# Patient Record
Sex: FEMALE | Race: WHITE | Employment: OTHER | URBAN - METROPOLITAN AREA
[De-identification: names, ages, dates, MRNs, and addresses within clinical notes are randomized per-mention and may not be internally consistent; named-entity substitution may affect disease eponyms.]

---

## 2019-11-17 ENCOUNTER — ANESTHESIA EVENT (OUTPATIENT)
Dept: ENDOSCOPY | Age: 74
End: 2019-11-17
Payer: MEDICARE

## 2019-11-18 ENCOUNTER — ANESTHESIA (OUTPATIENT)
Dept: ENDOSCOPY | Age: 74
End: 2019-11-18
Payer: MEDICARE

## 2019-11-18 ENCOUNTER — HOSPITAL ENCOUNTER (OUTPATIENT)
Age: 74
Setting detail: OUTPATIENT SURGERY
Discharge: HOME | End: 2019-11-18
Attending: INTERNAL MEDICINE | Admitting: INTERNAL MEDICINE
Payer: MEDICARE

## 2019-11-18 VITALS
HEART RATE: 70 BPM | DIASTOLIC BLOOD PRESSURE: 69 MMHG | OXYGEN SATURATION: 97 % | HEIGHT: 63 IN | TEMPERATURE: 97.3 F | WEIGHT: 186 LBS | BODY MASS INDEX: 32.96 KG/M2 | SYSTOLIC BLOOD PRESSURE: 129 MMHG | RESPIRATION RATE: 14 BRPM

## 2019-11-18 PROCEDURE — 74011000250 HC RX REV CODE- 250: Performed by: NURSE ANESTHETIST, CERTIFIED REGISTERED

## 2019-11-18 PROCEDURE — 76060000031 HC ANESTHESIA FIRST 0.5 HR: Performed by: INTERNAL MEDICINE

## 2019-11-18 PROCEDURE — 74011250636 HC RX REV CODE- 250/636: Performed by: NURSE ANESTHETIST, CERTIFIED REGISTERED

## 2019-11-18 PROCEDURE — 88305 TISSUE EXAM BY PATHOLOGIST: CPT

## 2019-11-18 PROCEDURE — 77030013992 HC SNR POLYP ENDOSC BSC -B: Performed by: INTERNAL MEDICINE

## 2019-11-18 PROCEDURE — 76040000019: Performed by: INTERNAL MEDICINE

## 2019-11-18 PROCEDURE — 77030008565 HC TBNG SUC IRR ERBE -B: Performed by: INTERNAL MEDICINE

## 2019-11-18 PROCEDURE — 77030018846 HC SOL IRR STRL H20 ICUM -A: Performed by: INTERNAL MEDICINE

## 2019-11-18 RX ORDER — SODIUM CHLORIDE, SODIUM LACTATE, POTASSIUM CHLORIDE, CALCIUM CHLORIDE 600; 310; 30; 20 MG/100ML; MG/100ML; MG/100ML; MG/100ML
75 INJECTION, SOLUTION INTRAVENOUS CONTINUOUS
Status: CANCELLED | OUTPATIENT
Start: 2019-11-18 | End: 2019-11-18

## 2019-11-18 RX ORDER — SODIUM CHLORIDE 0.9 % (FLUSH) 0.9 %
5-40 SYRINGE (ML) INJECTION AS NEEDED
Status: CANCELLED | OUTPATIENT
Start: 2019-11-18

## 2019-11-18 RX ORDER — FENTANYL CITRATE 50 UG/ML
50 INJECTION, SOLUTION INTRAMUSCULAR; INTRAVENOUS AS NEEDED
Status: CANCELLED | OUTPATIENT
Start: 2019-11-18

## 2019-11-18 RX ORDER — ONDANSETRON 2 MG/ML
4 INJECTION INTRAMUSCULAR; INTRAVENOUS ONCE
Status: CANCELLED | OUTPATIENT
Start: 2019-11-18 | End: 2019-11-18

## 2019-11-18 RX ORDER — SODIUM CHLORIDE 0.9 % (FLUSH) 0.9 %
5-40 SYRINGE (ML) INJECTION EVERY 8 HOURS
Status: DISCONTINUED | OUTPATIENT
Start: 2019-11-18 | End: 2019-11-18 | Stop reason: HOSPADM

## 2019-11-18 RX ORDER — SODIUM CHLORIDE 0.9 % (FLUSH) 0.9 %
5-40 SYRINGE (ML) INJECTION EVERY 8 HOURS
Status: CANCELLED | OUTPATIENT
Start: 2019-11-18

## 2019-11-18 RX ORDER — DEXTROSE 50 % IN WATER (D50W) INTRAVENOUS SYRINGE
25-50 AS NEEDED
Status: CANCELLED | OUTPATIENT
Start: 2019-11-18

## 2019-11-18 RX ORDER — TELMISARTAN 40 MG/1
40 TABLET ORAL DAILY
COMMUNITY
End: 2022-09-03

## 2019-11-18 RX ORDER — PROPOFOL 10 MG/ML
INJECTION, EMULSION INTRAVENOUS AS NEEDED
Status: DISCONTINUED | OUTPATIENT
Start: 2019-11-18 | End: 2019-11-18 | Stop reason: HOSPADM

## 2019-11-18 RX ORDER — MAGNESIUM SULFATE 100 %
4 CRYSTALS MISCELLANEOUS AS NEEDED
Status: CANCELLED | OUTPATIENT
Start: 2019-11-18

## 2019-11-18 RX ORDER — LIDOCAINE HYDROCHLORIDE 20 MG/ML
INJECTION, SOLUTION EPIDURAL; INFILTRATION; INTRACAUDAL; PERINEURAL AS NEEDED
Status: DISCONTINUED | OUTPATIENT
Start: 2019-11-18 | End: 2019-11-18 | Stop reason: HOSPADM

## 2019-11-18 RX ORDER — SODIUM CHLORIDE, SODIUM LACTATE, POTASSIUM CHLORIDE, CALCIUM CHLORIDE 600; 310; 30; 20 MG/100ML; MG/100ML; MG/100ML; MG/100ML
75 INJECTION, SOLUTION INTRAVENOUS CONTINUOUS
Status: DISCONTINUED | OUTPATIENT
Start: 2019-11-18 | End: 2019-11-18 | Stop reason: HOSPADM

## 2019-11-18 RX ORDER — SODIUM CHLORIDE 0.9 % (FLUSH) 0.9 %
5-40 SYRINGE (ML) INJECTION AS NEEDED
Status: DISCONTINUED | OUTPATIENT
Start: 2019-11-18 | End: 2019-11-18 | Stop reason: HOSPADM

## 2019-11-18 RX ADMIN — PROPOFOL 60 MG: 10 INJECTION, EMULSION INTRAVENOUS at 11:44

## 2019-11-18 RX ADMIN — LIDOCAINE HYDROCHLORIDE 40 MG: 20 INJECTION, SOLUTION EPIDURAL; INFILTRATION; INTRACAUDAL; PERINEURAL at 11:44

## 2019-11-18 RX ADMIN — PROPOFOL 50 MG: 10 INJECTION, EMULSION INTRAVENOUS at 11:48

## 2019-11-18 RX ADMIN — PROPOFOL 40 MG: 10 INJECTION, EMULSION INTRAVENOUS at 11:53

## 2019-11-18 RX ADMIN — PROPOFOL 40 MG: 10 INJECTION, EMULSION INTRAVENOUS at 11:58

## 2019-11-18 RX ADMIN — FAMOTIDINE 20 MG: 10 INJECTION INTRAVENOUS at 11:10

## 2019-11-18 RX ADMIN — SODIUM CHLORIDE, SODIUM LACTATE, POTASSIUM CHLORIDE, AND CALCIUM CHLORIDE 75 ML/HR: 600; 310; 30; 20 INJECTION, SOLUTION INTRAVENOUS at 11:00

## 2019-11-18 NOTE — DISCHARGE INSTRUCTIONS
High-Fiber Diet: Care Instructions  Your Care Instructions    A high-fiber diet may help you relieve constipation and feel less bloated. Your doctor and dietitian will help you make a high-fiber eating plan based on your personal needs. The plan will include the things you like to eat. It will also make sure that you get 30 grams of fiber a day. Before you make changes to the way you eat, be sure to talk with your doctor or dietitian. Follow-up care is a key part of your treatment and safety. Be sure to make and go to all appointments, and call your doctor if you are having problems. It's also a good idea to know your test results and keep a list of the medicines you take. How can you care for yourself at home? · You can increase how much fiber you get if you eat more of certain foods. These foods include:  ? Whole-grain breads and cereals. ? Fruits, such as pears, apples, and peaches. Eat the skins, peels, and seeds, if you can.  ? Vegetables, such as broccoli, cabbage, spinach, carrots, asparagus, and squash. ? Starchy vegetables. These include potatoes with skins, kidney beans, and lima beans. · Take a fiber supplement every day if your doctor recommends it. Examples are Benefiber, Citrucel, FiberCon, and Metamucil. Ask your doctor how much to take. · Drink plenty of fluids, enough so that your urine is light yellow or clear like water. If you have kidney, heart, or liver disease and have to limit fluids, talk with your doctor before you increase the amount of fluids you drink. · Get some exercise every day. Exercise helps stool move through the colon. It also helps prevent constipation. · Keep a food diary. Try to notice and write down what foods cause gas, pain, or other symptoms. Then you can avoid these foods. Where can you learn more? Go to http://fernie-remberto.info/. Enter Q745 in the search box to learn more about \"High-Fiber Diet: Care Instructions. \"  Current as of: November 7, 2018  Content Version: 12.2  © 9758-7369 [employer]. Care instructions adapted under license by [employer] (which disclaims liability or warranty for this information). If you have questions about a medical condition or this instruction, always ask your healthcare professional. [address] any warranty or liability for your use of this information. Learning About Diverticulosis and Diverticulitis  What are diverticulosis and diverticulitis? In diverticulosis and diverticulitis, pouches called diverticula form in the wall of the large intestine, or colon. · In diverticulosis, the pouches do not cause any pain or other symptoms. · In diverticulitis, the pouches get inflamed or infected and cause symptoms. Doctors aren't sure what causes these pouches in the colon. But they think that a low-fiber diet may play a role. Without fiber to add bulk to the stool, the colon has to work harder than normal to push the stool forward. The pressure from this may cause pouches to form in weak spots along the colon. Some people with diverticulosis get diverticulitis. But experts don't know why this happens. What are the symptoms? · In diverticulosis, most people don't have symptoms. But pouches sometimes bleed. · In diverticulitis, symptoms may last from a few hours to a week or more. They include:  ? Belly pain. This is usually in the lower left side. It is sometimes worse when you move. This is the most common symptom. ? Fever and chills. ? Bloating and gas. ? Diarrhea or constipation. ? Nausea and sometimes vomiting.  ? Not feeling like eating. How can you prevent these problems? You may be able to lower your chance of getting diverticulitis. You can do this by taking steps to prevent constipation. · Eat fruits, vegetables, beans, and whole grains every day. These foods are high in fiber.   · Drink plenty of fluids (enough so that your urine is light yellow or clear like water). If you have kidney, heart, or liver disease and have to limit fluids, talk with your doctor before you increase the amount of fluids you drink. · Get at least 30 minutes of exercise on most days of the week. Walking is a good choice. You also may want to do other activities, such as running, swimming, cycling, or playing tennis or team sports. · Take a fiber supplement, such as Citrucel or Metamucil, every day if needed. Read and follow all instructions on the label. · Schedule time each day for a bowel movement. Having a daily routine may help. Take your time and do not strain when having a bowel movement. Some people avoid nuts, seeds, berries, and popcorn. They believe that these foods might get trapped in the diverticula and cause pain. But there is no proof that these foods cause diverticulitis or make it worse. How are these problems treated? · The best way to treat diverticulosis is to avoid constipation. (See the tips above.)  · Treatment for diverticulitis includes antibiotics and often a change in your diet. You may need only liquids at first. Your doctor may suggest pain medicines for pain or belly cramps. In some cases, surgery may be needed. Follow-up care is a key part of your treatment and safety. Be sure to make and go to all appointments, and call your doctor if you are having problems. It's also a good idea to know your test results and keep a list of the medicines you take. Where can you learn more? Go to http://fernie-remberto.info/. Enter E343 in the search box to learn more about \"Learning About Diverticulosis and Diverticulitis. \"  Current as of: November 7, 2018  Content Version: 12.2  © 3587-7693 [employer]. Care instructions adapted under license by [employer] (which disclaims liability or warranty for this information).  If you have questions about a medical condition or this instruction, always ask your healthcare professional. [address] any warranty or liability for your use of this information. Colon Polyps: Care Instructions  Your Care Instructions    Colon polyps are growths in the colon or the rectum. The cause of most colon polyps is not known, and most people who get them do not have any problems. But a certain kind can turn into cancer. For this reason, regular testing for colon polyps is important for people as they get older. It is also important for anyone who has an increased risk for colon cancer. Polyps are usually found through routine colon cancer screening tests. Although most colon polyps are not cancerous, they are usually removed and then tested for cancer. Screening for colon cancer saves lives because the cancer can usually be cured if it is caught early. If you have a polyp that is the type that can turn into cancer, you may need more tests to examine your entire colon. The doctor will remove any other polyps that he or she finds, and you will be tested more often. Follow-up care is a key part of your treatment and safety. Be sure to make and go to all appointments, and call your doctor if you are having problems. It's also a good idea to know your test results and keep a list of the medicines you take. How can you care for yourself at home? Regular exams to look for colon polyps are the best way to prevent polyps from turning into colon cancer. These can include stool tests, sigmoidoscopy, colonoscopy, and CT colonography. Talk with your doctor about a testing schedule that is right for you. To prevent polyps  There is no home treatment that can prevent colon polyps. But these steps may help lower your risk for cancer. · Stay active. Being active can help you get to and stay at a healthy weight. Try to exercise on most days of the week. Walking is a good choice. · Eat well.  Choose a variety of vegetables, fruits, legumes (such as peas and beans), fish, poultry, and whole grains. · Do not smoke. If you need help quitting, talk to your doctor about stop-smoking programs and medicines. These can increase your chances of quitting for good. · If you drink alcohol, limit how much you drink. Limit alcohol to 2 drinks a day for men and 1 drink a day for women. When should you call for help? Call your doctor now or seek immediate medical care if:    · You have severe belly pain.     · Your stools are maroon or very bloody.    Watch closely for changes in your health, and be sure to contact your doctor if:    · You have a fever.     · You have nausea or vomiting.     · You have a change in bowel habits (new constipation or diarrhea).     · Your symptoms get worse or are not improving as expected. Where can you learn more? Go to http://fernie-remberto.info/. Enter 95 [id number] in the search box to learn more about \"Colon Polyps: Care Instructions. \"  Current as of: December 19, 2018  Content Version: 12.2  © 8046-5554 [employer]. Care instructions adapted under license by [employer] (which disclaims liability or warranty for this information). If you have questions about a medical condition or this instruction, always ask your healthcare professional. [place] [address] any warranty or liability for your use of this information. Colonoscopy: What to Expect at [address] [place] [address]  After you have a colonoscopy, you will stay at the clinic for 1 to 2 hours until the medicines wear off. Then you can go home. But you will need to arrange for a ride. Your doctor will tell you when you can eat and do your other usual activities. Your doctor will talk to you about when you will need your next colonoscopy. Your doctor can help you decide how often you need to be checked. This will depend on the results of your test and your risk for colorectal cancer. After the test, you may be bloated or have gas pains.  You may need to pass gas. If a biopsy was done or a polyp was removed, you may have streaks of blood in your stool (feces) for a few days. Problems such as heavy rectal bleeding may not occur until several weeks after the test. This isn't common. But it can happen after polyps are removed. This care sheet gives you a general idea about how long it will take for you to recover. But each person recovers at a different pace. Follow the steps below to get better as quickly as possible. How can you care for yourself at home? Activity    · Rest when you feel tired.     · You can do your normal activities when it feels okay to do so. Diet    · Follow your doctor's directions for eating.     · Unless your doctor has told you not to, drink plenty of fluids. This helps to replace the fluids that were lost during the colon prep.     · Do not drink alcohol. Medicines    · Your doctor will tell you if and when you can restart your medicines. He or she will also give you instructions about taking any new medicines.     · If you take blood thinners, such as warfarin (Coumadin), clopidogrel (Plavix), or aspirin, be sure to talk to your doctor. He or she will tell you if and when to start taking those medicines again. Make sure that you understand exactly what your doctor wants you to do.     · If polyps were removed or a biopsy was done during the test, your doctor may tell you not to take aspirin or other anti-inflammatory medicines for a few days. These include ibuprofen (Advil, Motrin) and naproxen (Aleve). Other instructions    · For your safety, do not drive or operate machinery until the medicine wears off and you can think clearly. Your doctor may tell you not to drive or operate machinery until the day after your test.     · Do not sign legal documents or make major decisions until the medicine wears off and you can think clearly. The anesthesia can make it hard for you to fully understand what you are agreeing to. Follow-up care is a key part of your treatment and safety. Be sure to make and go to all appointments, and call your doctor if you are having problems. It's also a good idea to know your test results and keep a list of the medicines you take. When should you call for help? Call 911 anytime you think you may need emergency care. For example, call if:    · You passed out (lost consciousness).     · You pass maroon or bloody stools.     · You have trouble breathing.    Call your doctor now or seek immediate medical care if:    · You have pain that does not get better after you take pain medicine.     · You are sick to your stomach or cannot drink fluids.     · You have new or worse belly pain.     · You have blood in your stools.     · You have a fever.     · You cannot pass stools or gas.    Watch closely for changes in your health, and be sure to contact your doctor if you have any problems. Where can you learn more? Go to http://fernie-remberto.info/. Enter E264 in the search box to learn more about \"Colonoscopy: What to Expect at Home. \"  Current as of: December 19, 2018  Content Version: 12.2  © 9034-1803 [employer]. Care instructions adapted under license by [employer] (which disclaims liability or warranty for this information). If you have questions about a medical condition or this instruction, always ask your healthcare professional. [address] any warranty or liability for your use of this information. DISCHARGE SUMMARY from Nurse    PATIENT INSTRUCTIONS:    After general anesthesia or intravenous sedation, for 24 hours or while taking prescription Narcotics:  · Limit your activities  · Do not drive and operate hazardous machinery  · Do not make important personal or business decisions  · Do  not drink alcoholic beverages  · If you have not urinated within 8 hours after discharge, please contact your surgeon on call.     Report the following to your surgeon:  · Excessive pain, swelling, redness or odor of or around the surgical area  · Temperature over 100.5  · Nausea and vomiting lasting longer than 4 hours or if unable to take medications  · Any signs of decreased circulation or nerve impairment to extremity: change in color, persistent  numbness, tingling, coldness or increase pain  · Any questions    What to do at Home:  Recommended activity: Activity as tolerated and no driving for today. *  Please give a list of your current medications to your Primary Care Provider. *  Please update this list whenever your medications are discontinued, doses are      changed, or new medications (including over-the-counter products) are added. *  Please carry medication information at all times in case of emergency situations. These are general instructions for a healthy lifestyle:    No smoking/ No tobacco products/ Avoid exposure to second hand smoke  Surgeon General's Warning:  Quitting smoking now greatly reduces serious risk to your health. Obesity, smoking, and sedentary lifestyle greatly increases your risk for illness    A healthy diet, regular physical exercise & weight monitoring are important for maintaining a healthy lifestyle    You may be retaining fluid if you have a history of heart failure or if you experience any of the following symptoms:  Weight gain of 3 pounds or more overnight or 5 pounds in a week, increased swelling in our hands or feet or shortness of breath while lying flat in bed. Please call your doctor as soon as you notice any of these symptoms; do not wait until your next office visit. The discharge information has been reviewed with the patient and child. The patient and child verbalized understanding.   Discharge medications reviewed with the patient and child and appropriate educational materials and side effects teaching were provided.   ___________________________________________________________________________________________________________________________________

## 2019-11-18 NOTE — ANESTHESIA POSTPROCEDURE EVALUATION
Procedure(s):  COLONOSCOPY with polyp bx. MAC    Anesthesia Post Evaluation      Multimodal analgesia: multimodal analgesia not used between 6 hours prior to anesthesia start to PACU discharge  Patient location during evaluation: PACU  Level of consciousness: awake and alert  Pain score: 0  Airway patency: patent  Anesthetic complications: no  Cardiovascular status: acceptable  Respiratory status: acceptable  Hydration status: acceptable  Post anesthesia nausea and vomiting:  none      Vitals Value Taken Time   /59 11/18/2019 12:10 PM   Temp 36.4 °C (97.6 °F) 11/18/2019 12:10 PM   Pulse 78 11/18/2019 12:16 PM   Resp 14 11/18/2019 12:16 PM   SpO2 100 % 11/18/2019 12:16 PM   Vitals shown include unvalidated device data.

## 2019-11-18 NOTE — ANESTHESIA PREPROCEDURE EVALUATION
Relevant Problems   No relevant active problems       Anesthetic History   No history of anesthetic complications            Review of Systems / Medical History  Patient summary reviewed and pertinent labs reviewed    Pulmonary  Within defined limits                 Neuro/Psych   Within defined limits           Cardiovascular    Hypertension: well controlled              Exercise tolerance: >4 METS     GI/Hepatic/Renal     GERD: well controlled      Liver disease     Endo/Other  Within defined limits           Other Findings              Physical Exam    Airway  Mallampati: III  TM Distance: < 4 cm  Neck ROM: normal range of motion   Mouth opening: Normal     Cardiovascular  Regular rate and rhythm,  S1 and S2 normal,  no murmur, click, rub, or gallop  Rhythm: regular  Rate: normal         Dental    Dentition: Poor dentition     Pulmonary  Breath sounds clear to auscultation               Abdominal  Abdominal exam normal       Other Findings            Anesthetic Plan    ASA: 3  Anesthesia type: MAC          Induction: Intravenous  Anesthetic plan and risks discussed with: Patient
<<-----Click here for Discharge Medication Review

## 2019-11-18 NOTE — H&P
[facility]    [email or web address]      Impression:   1.rectal bleed       Plan:     1. [place] mac all risks benefits and alt discussed       Chief Complaint: rectal bleed       HPI:  Braeden Ledbetter is a 69 y.o. female who is being seen on consult for rectal bleed .     PMH:   Past Medical History:   Diagnosis Date    Allergic rhinitis     Cancer ([facility] [address] 1995    Uterine Cancer (radiation, surgery)    Diverticulosis     GERD (gastroesophageal reflux disease)     Hypercholesteremia     Hypertension     Steatosis of liver 2015       PSH:   Past Surgical History:   Procedure Laterality Date    HX COLONOSCOPY      HX HYSTERECTOMY  1995    HX OTHER SURGICAL      Liver biopsy       Social HX:   Social History     Socioeconomic History    Marital status:      Spouse name: Not on file    Number of children: Not on file    Years of education: Not on file    Highest education level: Not on file   Occupational History    Not on file   Social Needs    Financial resource strain: Not on file    Food insecurity:     Worry: Not on file     Inability: Not on file    Transportation needs:     Medical: Not on file     Non-medical: Not on file   Tobacco Use    Smoking status: Never Smoker    Smokeless tobacco: Never Used   Substance and Sexual Activity    Alcohol use: Yes     Comment: occasionally    Drug use: No    Sexual activity: Not on file   Lifestyle    Physical activity:     Days per week: Not on file     Minutes per session: Not on file    Stress: Not on file   Relationships    Social connections:     Talks on phone: Not on file     Gets together: Not on file     Attends Islam service: Not on file     Active member of club or organization: Not on file     Attends meetings of clubs or organizations: Not on file     Relationship status: Not on file    Intimate partner violence:     Fear of current or ex partner: Not on file     Emotionally abused: Not on file     Physically abused: Not on file     Forced sexual activity: Not on file   Other Topics Concern    Not on file   Social History Narrative    Not on file       FHX:   History reviewed. No pertinent family history. Allergy:   Allergies   Allergen Reactions    Penicillin G Hives       Home Medications:     Medications Prior to Admission   Medication Sig    telmisartan (MICARDIS) 40 mg tablet Take 40 mg by mouth daily.  fexofenadine (ALLEGRA) 180 mg tablet Take 180 mg by mouth daily.  omeprazole (PRILOSEC) 20 mg capsule Take 20 mg by mouth daily.  aspirin delayed-release 81 mg tablet Take 81 mg by mouth daily.  atorvastatin (LIPITOR) 40 mg tablet Take 40 mg by mouth daily.  calcium citrate-vitamin d3 (CITRACAL + D) 315-200 mg-unit tab Take 1 tablet by mouth daily (with breakfast).  ciprofloxacin HCl (CIPRO) 500 mg tablet Take 1 Tab by mouth once as needed for up to 1 dose.  calcium 500 mg tab Take  by mouth.  meloxicam (MOBIC) 15 mg tablet Take 15 mg by mouth daily. Review of Systems:     Constitutional: No fevers, chills, weight loss, fatigue. Skin: No rashes, pruritis, jaundice, ulcerations, erythema. HENT: No headaches, nosebleeds, sinus pressure, rhinorrhea, sore throat. Eyes: No visual changes, blurred vision, eye pain, photophobia, jaundice. Cardiovascular: No chest pain, heart palpitations. Respiratory: No cough, SOB, wheezing, chest discomfort, orthopnea. Gastrointestinal: Rectal bleed   Genitourinary: No dysuria, bleeding, discharge, pyuria. Musculoskeletal: No weakness, arthralgias, wasting. Endo: No sweats. Heme: No bruising, easy bleeding. Allergies: As noted. Neurological: Cranial nerves intact. Alert and oriented. Gait not assessed. Psychiatric:  No anxiety, depression, hallucinations.                  Visit Vitals  /81   Pulse 80   Temp 97.7 °F (36.5 °C)   Resp 18   Ht 5' 3\" (1.6 m)   Wt 84.4 kg (186 lb)   SpO2 95%   BMI 32.95 kg/m²       Physical Assessment:     constitutional: appearance: well developed, well nourished, normal habitus, no deformities, in no acute distress. skin: inspection: no rashes, ulcers, icterus or other lesions; no clubbing or telangiectasias. palpation: no induration or subcutaneos nodules. eyes: inspection: normal conjunctivae and lids; no jaundice pupils: symmetrical, normoreactive to light, normal accommodation and size. ENMT: mouth: normal oral mucosa,lips and gums; good dentition. oropharynx: normal tongue, hard and soft palate; posterior pharynx without erythema, exudate or lesions. neck: no masses organomegaly or tenderness. respiratory: effort: normal chest excursion; no intercostal retraction or accessory muscle use. cardiovascular: abdominal aorta: normal size and position; no bruits. palpation: PMI of normal size and position; normal rhythm; no thrill or murmurs. abdominal: abdomen: normal consistency; no tenderness or masses. hernias: no hernias appreciated. liver: normal size and consistency. spleen: not palpable. rectal: hemoccult/guaiac: not performed. musculoskeletal: no deformities or muscle wasting   lymphatic: axilae: not palpable. groin: not palpable. neck: within normal limits. other: not palpable. neurologic: cranial nerves: II-XII normal.   psychiatric: judgement/insight: within normal limits. memory: within normal limits for recent and remote events. mood and affect: no evidence of depression, anxiety or agitation. orientation: oriented to time, space and person. Basic Metabolic Profile   No results for input(s): NA, K, CL, CO2, BUN, GLU, CA, MG, PHOS in the last 72 hours. No lab exists for component: CREAT      CBC w/Diff    No results for input(s): WBC, RBC, HGB, HCT, MCV, MCH, MCHC, RDW, PLT, HGBEXT, HCTEXT, PLTEXT in the last 72 hours.     No lab exists for component: MPV No results for input(s): GRANS, LYMPH, EOS, PRO, MYELO, METAS, BLAST in the last 72 hours. No lab exists for component: MONO, BASO     Hepatic Function   No results for input(s): ALB, TP, TBILI, GPT, SGOT, AP, AML, LPSE in the last 72 hours. No lab exists for component: Nancy Fitzpatrick MD, M.D. [facility], [address] [place] [address]  [email or web address]

## 2022-08-23 ENCOUNTER — HOSPITAL ENCOUNTER (INPATIENT)
Age: 77
LOS: 11 days | Discharge: HOME HEALTH | DRG: 098 | End: 2022-09-03
Attending: EMERGENCY MEDICINE | Admitting: EMERGENCY MEDICINE
Payer: MEDICARE

## 2022-08-23 DIAGNOSIS — G37.3 IDIOPATHIC TRANSVERSE MYELITIS (HCC): Primary | ICD-10-CM

## 2022-08-23 LAB
GLUCOSE BLD STRIP.AUTO-MCNC: 148 MG/DL (ref 70–110)
GLUCOSE BLD STRIP.AUTO-MCNC: 184 MG/DL (ref 70–110)

## 2022-08-23 PROCEDURE — 74011636637 HC RX REV CODE- 636/637: Performed by: EMERGENCY MEDICINE

## 2022-08-23 PROCEDURE — 74011250637 HC RX REV CODE- 250/637: Performed by: EMERGENCY MEDICINE

## 2022-08-23 PROCEDURE — 82962 GLUCOSE BLOOD TEST: CPT

## 2022-08-23 PROCEDURE — 2709999900 HC NON-CHARGEABLE SUPPLY

## 2022-08-23 PROCEDURE — 65310000000 HC RM PRIVATE REHAB

## 2022-08-23 RX ORDER — METFORMIN HYDROCHLORIDE 500 MG/1
500 TABLET ORAL
Status: DISCONTINUED | OUTPATIENT
Start: 2022-08-24 | End: 2022-09-03 | Stop reason: HOSPADM

## 2022-08-23 RX ORDER — TIMOLOL MALEATE 2.5 MG/ML
1 SOLUTION/ DROPS OPHTHALMIC 2 TIMES DAILY
Status: DISCONTINUED | OUTPATIENT
Start: 2022-08-23 | End: 2022-09-03 | Stop reason: HOSPADM

## 2022-08-23 RX ORDER — LOSARTAN POTASSIUM 50 MG/1
50 TABLET ORAL DAILY
Status: DISCONTINUED | OUTPATIENT
Start: 2022-08-24 | End: 2022-09-03 | Stop reason: HOSPADM

## 2022-08-23 RX ORDER — CETIRIZINE HCL 10 MG
5 TABLET ORAL
Status: DISCONTINUED | OUTPATIENT
Start: 2022-08-23 | End: 2022-09-03 | Stop reason: HOSPADM

## 2022-08-23 RX ORDER — HYDROCHLOROTHIAZIDE 25 MG/1
12.5 TABLET ORAL DAILY
Status: DISCONTINUED | OUTPATIENT
Start: 2022-08-24 | End: 2022-09-03 | Stop reason: HOSPADM

## 2022-08-23 RX ORDER — DEXTROSE MONOHYDRATE 100 MG/ML
0-250 INJECTION, SOLUTION INTRAVENOUS AS NEEDED
Status: DISCONTINUED | OUTPATIENT
Start: 2022-08-23 | End: 2022-09-03 | Stop reason: HOSPADM

## 2022-08-23 RX ORDER — CARVEDILOL 6.25 MG/1
6.25 TABLET ORAL EVERY 12 HOURS
Status: DISCONTINUED | OUTPATIENT
Start: 2022-08-23 | End: 2022-09-03 | Stop reason: HOSPADM

## 2022-08-23 RX ORDER — CALCIUM CARBONATE/VITAMIN D3 600MG-5MCG
1 TABLET ORAL 2 TIMES DAILY
Status: DISCONTINUED | OUTPATIENT
Start: 2022-08-23 | End: 2022-09-03 | Stop reason: HOSPADM

## 2022-08-23 RX ORDER — FLUTICASONE PROPIONATE 50 MCG
2 SPRAY, SUSPENSION (ML) NASAL DAILY
Status: DISCONTINUED | OUTPATIENT
Start: 2022-08-24 | End: 2022-09-03 | Stop reason: HOSPADM

## 2022-08-23 RX ORDER — INSULIN LISPRO 100 [IU]/ML
INJECTION, SOLUTION INTRAVENOUS; SUBCUTANEOUS
Status: DISCONTINUED | OUTPATIENT
Start: 2022-08-23 | End: 2022-09-03 | Stop reason: HOSPADM

## 2022-08-23 RX ORDER — ATORVASTATIN CALCIUM 40 MG/1
40 TABLET, FILM COATED ORAL
Status: DISCONTINUED | OUTPATIENT
Start: 2022-08-23 | End: 2022-09-03 | Stop reason: HOSPADM

## 2022-08-23 RX ORDER — MAGNESIUM SULFATE 100 %
4 CRYSTALS MISCELLANEOUS AS NEEDED
Status: DISCONTINUED | OUTPATIENT
Start: 2022-08-23 | End: 2022-09-03 | Stop reason: HOSPADM

## 2022-08-23 RX ORDER — DOCUSATE SODIUM 100 MG/1
100 CAPSULE, LIQUID FILLED ORAL 2 TIMES DAILY
Status: DISCONTINUED | OUTPATIENT
Start: 2022-08-23 | End: 2022-09-03 | Stop reason: HOSPADM

## 2022-08-23 RX ORDER — BISACODYL 5 MG
10 TABLET, DELAYED RELEASE (ENTERIC COATED) ORAL
Status: DISCONTINUED | OUTPATIENT
Start: 2022-08-23 | End: 2022-09-03 | Stop reason: HOSPADM

## 2022-08-23 RX ADMIN — CARVEDILOL 6.25 MG: 6.25 TABLET, FILM COATED ORAL at 17:23

## 2022-08-23 RX ADMIN — DOCUSATE SODIUM 100 MG: 100 CAPSULE, LIQUID FILLED ORAL at 17:22

## 2022-08-23 RX ADMIN — Medication 2 UNITS: at 17:23

## 2022-08-23 RX ADMIN — ATORVASTATIN CALCIUM 40 MG: 40 TABLET, FILM COATED ORAL at 22:04

## 2022-08-23 RX ADMIN — Medication 1 TABLET: at 17:23

## 2022-08-23 RX ADMIN — CARVEDILOL 6.25 MG: 6.25 TABLET, FILM COATED ORAL at 21:30

## 2022-08-23 NOTE — PROGRESS NOTES
SHIFT CHANGE NOTE FOR [facility]    Bedside and Verbal shift change report given to MISHA Valentino (oncoming nurse) by Terry Gee RN (offgoing nurse). Report included the following information SBAR, Kardex, MAR and Recent Results.     Situation:   Code Status: Full Code   Hospital Day: 0   Problem List:   Hospital Problems  Date Reviewed: 11/18/2019            Codes Class Noted POA    Idiopathic [facility] myelitis ([facility] ICD-10-CM: G37.3  ICD-9-CM: 341.22  8/23/2022 Unknown           Background:   Past Medical History:   Past Medical History:   Diagnosis Date    Allergic rhinitis     Cancer ([facility] 1995    Uterine Cancer (radiation, surgery)    Diverticulosis     GERD (gastroesophageal reflux disease)     Hypercholesteremia     Hypertension     Steatosis of liver 2015        Assessment:  Changes in Assessment throughout shift:      Patient has a central line: no Reasons if yes: n/a  Insertion date:- Last dressing date:-  Patient has Rdz Cath: no Reasons if yes: -   Insertion date: -  Shift rdz care completed: NO    Last Vitals:     Vitals:    08/23/22 1313 08/23/22 1505   BP: (!) 184/89 (!) 174/93   Pulse: 78 67   Resp: 16 18   Temp: 98.3 °F (36.8 °C) 97 °F (36.1 °C)   SpO2: 95% 95%   Weight: 88.5 kg (195 lb)    Height: 5' 4\" (1.626 m)        PAIN    Pain Assessment    Pain Intensity 1: 0 (08/23/22 1314) Pain Intensity 1: 2 (12/29/14 1105)      Pain Location 1: Abdomen      Pain Intervention(s) 1: Medication (see MAR)  Patient Stated Pain Goal: 0 Patient Stated Pain Goal: 0  Intervention effective: yes  Other actions taken for pain:      Skin Assessment  Skin color    Condition/Temperature    Integrity    Turgor    Weekly Pressure Ulcer Documentation    Wound Prevention & Protection Methods  Orientation of wound Orientation of Wound Prevention: Posterior  Location of Prevention    Dressing Present Dressing Present : No  Dressing Status    Wound Offloading      Wound Prevention Checklist  Precautions:      []  Heel prevention boots placed on patient    []  Patient turned q2h during shift    []  Hermilo Order Set ordered    []  Patient on Chata bed/Specialty bed    []  Each Wound is documented during shift (Stage, Color, drainage, odor, measurements, and dressings)    [x]  Dual skin checks done at bedside during shift report with MISHA Valentino    INTAKE/OUPUT  Date 08/22/22 1900 - 08/23/22 0659(Not Admitted) 08/23/22 0700 - 08/24/22 0659   Shift 6373-5721 24 Hour Total 9426-9580 7786-9147 24 Hour Total   INTAKE   Shift Total(mL/kg)        OUTPUT   Urine          Urine Occurrence(s)   1 x  1 x   Stool          Stool Occurrence(s)   0 x  0 x   Shift Total(mL/kg)        NET        Weight (kg)   88.5 88.5 88.5       Recommendations:  Patient needs and requests: toileting help. Pending tests/procedures: routine labs     Functional Level/Equipment: Partial (one person) / Wheelchair;Walker; Bedside Commode    Fall Precautions:   Fall risk precautions were reinforced with the patient; she was instructed to call for help prior to getting up. The following fall risk precautions were continued: bed/ chair alarms, door signage, yellow bracelet and socks as well as update of the Shade Eisenbergin tool in the patient's room. Jose J Score: 4    HEALS Safety Check    A safety check occurred in the patient's room between off going nurse and oncoming nurse listed above. The safety check included the below items  Area Items   H  High Alert Medications Verify all high alert medication drips (heparin, PCA, etc.)   E  Equipment Suction is set up for ALL patients (with ryan)  Red plugs utilized for all equipment (IV pumps, etc.)  WOWs wiped down at end of shift.   Room stocked with oxygen, suction, and other unit-specific supplies   A  Alarms Bed alarm is set for fall risk patients  Ensure chair alarm is in place and activated if patient is up in a chair   L  Lines Check IV for any infiltration  Valle bag is empty if patient has a Valle   Tubing and IV bags are labeled   S  Safety   Room is clean, patient is clean, and equipment is clean. Hallways are clear from equipment besides carts. Fall bracelet on for fall risk patients  Ensure room is clear and free of clutter  Suction is set up for ALL patients (with ryan)  Hallways are clear from equipment besides carts.    Isolation precautions followed, supplies available outside room, sign posted     Kylie Hernandez RN

## 2022-08-23 NOTE — PROGRESS NOTES
Problem: Falls - Risk of  Goal: *Absence of Falls  Description: Document Angel Sol Fall Risk and appropriate interventions in the flowsheet.   Outcome: Progressing Towards Goal  Note: Fall Risk Interventions:  Mobility Interventions: OT consult for ADLs, Patient to call before getting OOB, PT Consult for mobility concerns, PT Consult for assist device competence, Strengthening exercises (ROM-active/passive), Bed/chair exit alarm, Assess mobility with egress test         Medication Interventions: Bed/chair exit alarm, Patient to call before getting OOB, Teach patient to arise slowly, Utilize gait belt for transfers/ambulation, Evaluate medications/consider consulting pharmacy    Elimination Interventions: Call light in reach, Bed/chair exit alarm, Elevated toilet seat, Patient to call for help with toileting needs, Stay With Me (per policy), Toilet paper/wipes in reach, Toileting schedule/hourly rounds    History of Falls Interventions: Bed/chair exit alarm, Room close to nurse's station, Vital signs minimum Q4HRs X 24 hrs (comment for end date)         Problem: Patient Education: Go to Patient Education Activity  Goal: Patient/Family Education  Outcome: Progressing Towards Goal     Problem: Pain  Goal: *Control of Pain  Outcome: Progressing Towards Goal  Goal: *PALLIATIVE CARE:  Alleviation of Pain  8/23/2022 1748 by Liliya Bazan, RN  Outcome: Resolved/Met  8/23/2022 1747 by Liliya Bazan, RN  Outcome: Resolved/Not Met     Problem: Patient Education: Go to Patient Education Activity  Goal: Patient/Family Education  Outcome: Progressing Towards Goal

## 2022-08-23 NOTE — PROGRESS NOTES
Gurney Kehr is a 78 y.o.  female admitted on 8/23/2022 from [facility]. Report received from Hana. Bebe Trujillo RN. Transportation was provided by ambulance, and the patient was transferred to her room via [facility]. Patient was assisted to bed with assist of 1. The patient was oriented to her room. Fall risk precautions were discussed with the patient; she was instructed to call for help prior to getting up. The following fall risk precautions were initiated: bed/ chair alarms, door signage, yellow bracelet and socks as well as completion of the Lindwood Papo tool in the patient's room. Four eyes nurse skin assessment was performed by Shabbir Barragan RN and Sahil George.  Skin problems noted: NO    Svetlana Cisneros RN BSN

## 2022-08-24 LAB
ANION GAP SERPL CALC-SCNC: 7 MMOL/L (ref 3–18)
BASOPHILS # BLD: 0 K/UL (ref 0–0.1)
BASOPHILS NFR BLD: 0 % (ref 0–2)
BUN SERPL-MCNC: 30 MG/DL (ref 7–18)
BUN/CREAT SERPL: 39 (ref 12–20)
CALCIUM SERPL-MCNC: 8.7 MG/DL (ref 8.5–10.1)
CHLORIDE SERPL-SCNC: 104 MMOL/L (ref 100–111)
CO2 SERPL-SCNC: 29 MMOL/L (ref 21–32)
CREAT SERPL-MCNC: 0.76 MG/DL (ref 0.6–1.3)
DIFFERENTIAL METHOD BLD: ABNORMAL
EOSINOPHIL # BLD: 0 K/UL (ref 0–0.4)
EOSINOPHIL NFR BLD: 0 % (ref 0–5)
ERYTHROCYTE [DISTWIDTH] IN BLOOD BY AUTOMATED COUNT: 14 % (ref 11.6–14.5)
EST. AVERAGE GLUCOSE BLD GHB EST-MCNC: 146 MG/DL
GLUCOSE BLD STRIP.AUTO-MCNC: 108 MG/DL (ref 70–110)
GLUCOSE BLD STRIP.AUTO-MCNC: 123 MG/DL (ref 70–110)
GLUCOSE BLD STRIP.AUTO-MCNC: 127 MG/DL (ref 70–110)
GLUCOSE BLD STRIP.AUTO-MCNC: 131 MG/DL (ref 70–110)
GLUCOSE SERPL-MCNC: 146 MG/DL (ref 74–99)
HBA1C MFR BLD: 6.7 % (ref 4.2–5.6)
HCT VFR BLD AUTO: 39.4 % (ref 35–45)
HGB BLD-MCNC: 13.6 G/DL (ref 12–16)
IMM GRANULOCYTES # BLD AUTO: 0.1 K/UL (ref 0–0.04)
IMM GRANULOCYTES NFR BLD AUTO: 1 % (ref 0–0.5)
LYMPHOCYTES # BLD: 2.6 K/UL (ref 0.9–3.6)
LYMPHOCYTES NFR BLD: 24 % (ref 21–52)
MAGNESIUM SERPL-MCNC: 2.3 MG/DL (ref 1.6–2.6)
MCH RBC QN AUTO: 31 PG (ref 24–34)
MCHC RBC AUTO-ENTMCNC: 34.5 G/DL (ref 31–37)
MCV RBC AUTO: 89.7 FL (ref 78–100)
MONOCYTES # BLD: 1 K/UL (ref 0.05–1.2)
MONOCYTES NFR BLD: 9 % (ref 3–10)
NEUTS SEG # BLD: 6.9 K/UL (ref 1.8–8)
NEUTS SEG NFR BLD: 65 % (ref 40–73)
NRBC # BLD: 0 K/UL (ref 0–0.01)
NRBC BLD-RTO: 0 PER 100 WBC
PLATELET # BLD AUTO: 261 K/UL (ref 135–420)
PMV BLD AUTO: 9.2 FL (ref 9.2–11.8)
POTASSIUM SERPL-SCNC: 3.4 MMOL/L (ref 3.5–5.5)
RBC # BLD AUTO: 4.39 M/UL (ref 4.2–5.3)
SODIUM SERPL-SCNC: 140 MMOL/L (ref 136–145)
WBC # BLD AUTO: 10.5 K/UL (ref 4.6–13.2)

## 2022-08-24 PROCEDURE — 97542 WHEELCHAIR MNGMENT TRAINING: CPT

## 2022-08-24 PROCEDURE — 97535 SELF CARE MNGMENT TRAINING: CPT

## 2022-08-24 PROCEDURE — 74011000250 HC RX REV CODE- 250: Performed by: EMERGENCY MEDICINE

## 2022-08-24 PROCEDURE — 74011250637 HC RX REV CODE- 250/637: Performed by: HOSPITALIST

## 2022-08-24 PROCEDURE — 65310000000 HC RM PRIVATE REHAB

## 2022-08-24 PROCEDURE — 97112 NEUROMUSCULAR REEDUCATION: CPT

## 2022-08-24 PROCEDURE — 99223 1ST HOSP IP/OBS HIGH 75: CPT | Performed by: HOSPITALIST

## 2022-08-24 PROCEDURE — 97166 OT EVAL MOD COMPLEX 45 MIN: CPT

## 2022-08-24 PROCEDURE — 77030019905 HC CATH URETH INTMIT MDII -A

## 2022-08-24 PROCEDURE — 83735 ASSAY OF MAGNESIUM: CPT

## 2022-08-24 PROCEDURE — 97162 PT EVAL MOD COMPLEX 30 MIN: CPT

## 2022-08-24 PROCEDURE — 85025 COMPLETE CBC W/AUTO DIFF WBC: CPT

## 2022-08-24 PROCEDURE — 2709999900 HC NON-CHARGEABLE SUPPLY

## 2022-08-24 PROCEDURE — 36415 COLL VENOUS BLD VENIPUNCTURE: CPT

## 2022-08-24 PROCEDURE — 83036 HEMOGLOBIN GLYCOSYLATED A1C: CPT

## 2022-08-24 PROCEDURE — 51798 US URINE CAPACITY MEASURE: CPT

## 2022-08-24 PROCEDURE — 82962 GLUCOSE BLOOD TEST: CPT

## 2022-08-24 PROCEDURE — 80048 BASIC METABOLIC PNL TOTAL CA: CPT

## 2022-08-24 PROCEDURE — 97530 THERAPEUTIC ACTIVITIES: CPT

## 2022-08-24 PROCEDURE — 74011250637 HC RX REV CODE- 250/637: Performed by: EMERGENCY MEDICINE

## 2022-08-24 RX ORDER — TAMSULOSIN HYDROCHLORIDE 0.4 MG/1
0.4 CAPSULE ORAL DAILY
Status: DISCONTINUED | OUTPATIENT
Start: 2022-08-24 | End: 2022-08-31

## 2022-08-24 RX ADMIN — Medication 1 TABLET: at 17:36

## 2022-08-24 RX ADMIN — TIMOLOL MALEATE 1 DROP: 2.5 SOLUTION OPHTHALMIC at 09:19

## 2022-08-24 RX ADMIN — Medication 1 TABLET: at 09:11

## 2022-08-24 RX ADMIN — HYDROCHLOROTHIAZIDE 12.5 MG: 25 TABLET ORAL at 09:10

## 2022-08-24 RX ADMIN — ATORVASTATIN CALCIUM 40 MG: 40 TABLET, FILM COATED ORAL at 22:33

## 2022-08-24 RX ADMIN — LOSARTAN POTASSIUM 50 MG: 50 TABLET, FILM COATED ORAL at 09:11

## 2022-08-24 RX ADMIN — TAMSULOSIN HYDROCHLORIDE 0.4 MG: 0.4 CAPSULE ORAL at 18:23

## 2022-08-24 RX ADMIN — TIMOLOL MALEATE 1 DROP: 2.5 SOLUTION OPHTHALMIC at 17:37

## 2022-08-24 RX ADMIN — CARVEDILOL 6.25 MG: 6.25 TABLET, FILM COATED ORAL at 09:11

## 2022-08-24 RX ADMIN — DOCUSATE SODIUM 100 MG: 100 CAPSULE, LIQUID FILLED ORAL at 09:12

## 2022-08-24 RX ADMIN — METFORMIN HYDROCHLORIDE 500 MG: 500 TABLET ORAL at 09:11

## 2022-08-24 NOTE — ROUTINE PROCESS
1700 Pt has been asking to assist to urine every 30 minutes. Denied burning or pain while voiding but felt the urgency of peeing. Did a bladder scan, 362 ml urine was detected.  MD Ky Coleman was notified and 0.4 mg Flomax was ordered daily

## 2022-08-24 NOTE — PROGRESS NOTES
Problem: Mobility Impaired (Adult and Pediatric)  Goal: *Acute Goals and Plan of Care (Insert Text)  Description: Physical Therapy Short Term Goals  Initiated 8/24/2022 and to be accomplished within 7 day(s) (8/31/2022)  1. Patient will move from supine to sit and sit to supine  in bed with modified independence. 2.  Patient will transfer from bed to chair and chair to bed with supervision/set-up using the least restrictive device. 3.  Patient will perform sit to stand with maximal assistance. 4.  Patient will propel w/c over level surfaces greater than or equal to 150 feet with modified independence. Physical Therapy Long Term Goals  Initiated 8/24/2022 and to be accomplished within 21 day(s) (09/14/2022)  1. Patient will move from supine to sit and sit to supine , scoot up and down, and roll side to side in bed with independence. 2.  Patient will transfer from bed to chair and chair to bed with modified independence using the least restrictive device. 3.  Patient will perform sit to stand with minimal assistance/contact guard assist.  4.  Patient will ambulate with moderate assistance  for 25 feet with the least restrictive device. 5.  Patient will ascend/descend 4 stairs with 1 handrail(s) with moderate assistance. 6.  Patient will propel w/c over level surfaces, surfaces of varying grades, thresholds and ramps with modified independence. Outcome: Progressing Towards Goal    PHYSICAL THERAPY EVALUATION    Patient: Shobha Schaffer (50 y.o. female)  Date: 8/24/2022  Diagnosis: Idiopathic [facility] myel[facility] [G37.3] <principal problem not specified>  Precautions:  Fall Risk Precautions  Chart, physical therapy assessment, plan of care and goals were reviewed. Time HQ:3275  Time out:1113    Patient seen for: Balance activities;Transfer training;Patient education    Pain:  Pt pain was reported as no c/o pain pre-treatment.   Pt pain was reported as no c/ao pain post-treatment. Intervention: N/A    Patient identified with name and : yes    SUBJECTIVE:     Patient stated you just want to get up and go, but you can't.   Pt is pleasant and cooperative throughout treatment session but appears hesitant and nervous with attempts at anterior weight shift and B LE weight bearing requiring maximal encouragement to promote functional weight bearing. Patient's Goal for Physical Therapy: \"to get to stand and take steps. \"    OBJECTIVE DATA SUMMARY:     Past Medical History:   Diagnosis Date    Allergic rhinitis     Cancer ([facility]     Uterine Cancer (radiation, surgery)    Diverticulosis     GERD (gastroesophageal reflux disease)     Hypercholesteremia     Hypertension     Steatosis of liver      Past Surgical History:   Procedure Laterality Date    COLONOSCOPY N/A 2019    COLONOSCOPY with polyp bx performed by Azucena Redman MD at [facility]    HX COLONOSCOPY      HX HYSTERECTOMY      HX OTHER SURGICAL      Liver biopsy       Problem List:    Decreased strength B LE  [x]     Decreased strength trunk/core  [x]     Decreased AROM   [x]     Decreased PROM  []    Decreased endurance  [x]     Decreased balance sitting  [x]     Decreased balance standing  [x]     Pain   []     Slow ambulation velocity - Unable to progress to ambulation at time of evaluation  []    Decreased coordination  []    Decreased safety awareness  [x]      Functional Limitations:   Decreased independence with bed mobility  [x]     Decreased independence with functional transfers  [x]     Decreased independence with ambulation  [x]     Decreased independence with stair negotiation  [x]       Previous Functional Level: per pt she was functionally independent prior to recent hospitalization. She note she has a straight cane that was her 's and a rollator at home as well as a shower chair.     Home Environment: Home Environment: Private residence  # Steps to Enter: 4  Rails to Enter: Yes  Hand Rails : Bilateral  One/Two Story Residence: One story  Living Alone: No  Support Systems: Other Family Member(s)  Patient Expects to be Discharged to[de-identified] Home  Current DME Used/Available at Home: shower chair, cane, rollator  Tub or Shower Type: Tub/Shower combination    Barriers to Learning/Limitations: yes;  physical  Compensate with: visual, verbal, tactile, kinesthetic cues/model         Outcome Measures: Mobility assessment     MMT Initial Assessment   Right Lower Extremity Left Lower Extremity   Hip Flexion 3-/5   3-/5   Knee Extension  4/5 4+/5    Knee Flexion  3-/5  3-/5   Ankle Dorsiflexion 3-/5  3+/5    0/5 No palpable muscle contraction  1/5 Palpable muscle contraction, no joint movement  2-/5 Less than full range of motion in gravity eliminated position  2/5 Able to complete full range of motion in gravity eliminated position  2+/5 Able to initiate movement against gravity  3-/5 More than half but not full range of motion against gravity  3/5 Able to complete full range of motion against gravity  3+/5 Completes full range of motion against gravity with minimal resistance  4-/5 Completes full range of motion against gravity with minimal-moderate resistance  4/5 Completes full range of motion against gravity with moderate resistance  4+/5 Completes full range of motion against gravity with moderate-maximum resistance  5/5 Completes full range of motion against gravity with maximum resistance        GROSS ASSESSMENT Initial Assessment 8/24/2022   AROM Generally decreased, functional   Strength Grossly decreased, non-functional   Coordination Within functional limits   Tone Normal   Sensation Impaired   PROM Within functional limits       POSTURE Initial Assessment 8/24/2022   Posture (WDL) Exceptions to [facility]   Posture Assessment Rounded shoulders       BALANCE Initial Assessment 8/24/2022    Sitting - Static: Good (unsupported)  Sitting - Dynamic: Fair (occasional)  Standing - Static:  (unable to achieve full stand at this time)       BED/CHAIR/WHEELCHAIR TRANSFERS Initial Assessment 8/24/2022   Rolling Right 6 (Modified independent) with increased time   Rolling Left 6 (Modified independent) with increased time   Supine to Sit 5 (Supervision) for safety with cuing to avoid utilizing bed rail and increased time to perform   Sit to Stand Unable at this time   Sit to Supine 5 (Supervision) for safety   Transfer Assist Score 1 (Total assistance) (unable to achieve stand for stand step,, was able to perform lateral transfer with minimal physical assistance and maximal verbal cuing utilizing squat pivot technique with multiple attempts at clearing sacrum as pt does not maintain sacral clearance.)    Pt unable to achieve full stand due to B LE functional weakness and increased reliance on B UE support to achieve lift off as well as hesitancy with anterior weight shift and B LE weight bearing. Transfer Type Other   Car Transfer Unable at this time    Car Type  NT       WHEELCHAIR MOBILITY/MANAGEMENT Initial Assessment 8/24/2022   Able to Propel 186 feet   Assist Level 4   Curbs/ramps assistance required 0 (Not tested)   Wheelchair set up assistance required 3 (Moderate assistance)   Wheelchair management Manages left brake, Manages right brake   Comments Pt requires minimal assistance negotiating narrow spaces and obstacles utilizing B UEs and LEs with cuing for slower pace for increased time to achieve adequate left ankle DF for clearance. GAIT Initial Assessment 8/24/2022   Gait Description (WDL) Exceptions to WDL   Gait Abnormalities Unable to assess at this time due to pt's difficulty achieving full stand.         WALKING INDEPENDENCE Initial Assessment 8/24/2022   Assistive device     Ambulation assistance - level surface  (Unable at this time)   Distance 0 Feet (ft)   Comments     Ambulation assistance - unlevel surface         STEPS/STAIRS Initial Assessment 8/24/2022   Steps/Stairs ambulated 0   Rail Use     Assistance Level  (Unable at this time)   Comments     Curbs/Ramps         Therapeutic Exercises:   Seated B LE Strength Trainin Sets of 10 Repetitions:  Right and Left LAQ      Neuromuscular re-education:   Performed in front of mirror for visual feedback:  2 Sets of 5 Repetitions:  Sit to 1/4 Stand with B UEs pushing up from B arm rests of w/c, PT blocking B feet from sliding forward, and maximal manual cues for anterior weight shift with lift off and maintaining hip flexion with slow controlled descent for 1/4 Stand to Sit. Pt demonstrates increased B UE weight bearing despite maximal manual cues for anterior weight shift. ASSESSMENT :  Based on the objective data described above, the patient presents with impaired functional strength and balance, and impaired sensation limiting safety and independence with mobility. Patient will benefit from skilled intervention to address the above impairments. Patient's rehabilitation potential is considered to be Good  Factors which may influence rehabilitation potential include:   []         None noted  []         Mental ability/status  [x]         Medical condition  []         Home/family situation and support systems  [x]         Safety awareness  []         Pain tolerance/management  []         Other:      PLAN :  Please refer to POC for details re: long term goals. Order received from MD for physical therapy services and chart reviewed. Pt to be seen 5 times per week for 3 hours of total therapy per day for 3 weeks. Thank you for the referral.    Pt would benefit from skilled physical therapy in order to improve independent functional mobility within the home.  Interventions may include range of motion (AROM, PROM B LE/trunk), motor function (B LE/trunk strengthening/coordination), activity tolerance (vitals, oxygen saturation levels), bed mobility training, balance activities, gait training (progressive ambulation program), wheelchair management and functional transfer training. Patient would also benefit from concurrent and group therapy sessions to promote increased participation in skilled therapy interventions and to provide opportunities for increased social interaction. Discharge Recommendations: Home Health  Further Equipment Recommendations for Discharge: wheelchair 20\", 30\" transfer board       Activity Tolerance:   Fair - pt requires frequent rest breaks. Please refer to the flowsheet for vital signs taken during this treatment. After treatment:   [] Patient left in no apparent distress in bed  [x] Patient left in no apparent distress sitting up in chair  [] Patient left in no apparent distress in w/c mobilizing under own power  [] Patient left in no apparent distress dining area  [] Patient left in no apparent distress mobilizing under own power  [x] Call bell left within reach  [x] Nursing notified  [] Caregiver present  [] Bed alarm activated   [x] Chair alarm activated. COMMUNICATION/EDUCATION:   [x]         Fall prevention education was provided and the patient/caregiver indicated understanding. [x]         Patient/family have participated as able in goal setting and plan of care. [x]         Patient/family agree to work toward stated goals and plan of care. []         Patient understands intent and goals of therapy, but is neutral about his/her participation. []         Patient is unable to participate in goal setting and plan of care.     Thank you for this referral.  Colette Tan, PT, DPT  8/24/2022

## 2022-08-24 NOTE — CONSULTS
[facility] PSYCHOLOGICAL SCREENING    Assessment Initiated:  August 24, 2022    Rehab Diagnosis: Idiopathic Transverse Myelitis    Pertinent Physical/Psychiatric History:     Patient Active Problem List   Diagnosis Code    History of uterine cancer Z85.42    Hydronephrosis N13.30    Idiopathic transverse myelitis ([facility] [address] G37.3       Patient denies history of mental health services and is not currently requiring psychotropic medication for mood nor behavior stability. She denies tobacco use, rarely consumes alcohol and has no other substance use in history.       OBJECTIVE  GENERAL OBSERVATIONS  Willingness to participate in program: [x] good   [] fair [] indifferent [] poor    General Appearance: Patient observed casually and appropriately dressed and groomed and sitting upright in wheelchair, entirely cooperative on contact    Sensory Impairments: Patient has satisfactory auditory reception and comprehension and responds to all inquiry with intelligibility; she is reporting feelings of numbness and difficulty in voluntarily moving all body parts    Denominational Affiliation: [address] [place] [address]    Admission Assessment  Discharge Status   [x] alert  [] lethargic  [] difficult to arouse  [] fluctuating  [] other: Level of Consciousness [] alert  [] lethargic  [] difficult to arouse  [] fluctuating  [] other:   [x] person  [x] place  [x] time  [x] situation Oriented [] person  [] place  [] time  [] situation   [x] within normal limits  [] impaired       [] mild        [] moderate        [] severe Attention [] within normal limits  [] impaired       [] mild        [] moderate        [] severe   [x] within normal limits  [x] impaired       [x] mild        [] moderate        [] severe Memory [] within normal limits  [] impaired       [] mild        [] moderate        [] severe   [x] appropriate to situation  [] depressed  [] anxious  [] angry   [] fearful  [] emotionally labile  [x] other: She admits to feeling stressed with acute onset of illness but not observed in acute distress Mood [] appropriate to situation  [] depressed  [] anxious  [] angry   [] fearful  [] emotionally labile  [] other:   [x] appropriate  [] flat  [] inappropriate to content of speech Affect [] appropriate  [] flat  [] inappropriate to content of speech   [x] appropriate  [] aggressive/agitated  [] withdrawn  [] inappropriate  [x] other: She presents as calm and composed, neither restless nor agitated Behavior [] appropriate  [] aggressive/agitated  [] withdrawn  [] inappropriate  [] other:   [] good  [x] limited  [] denial  [] none Insight Into Illness [] good  [] limited  [] denial  [] none   [x] intact  [] impaired       [] mild        [] moderate        [] severe       Describe: Patient will benefit from cues and prompts for maximum recall of novel information Cognition [] intact  [] impaired       [] mild        [] moderate        [] severe       Describe:    [x] coping  [] demonstrates poor adjustment  [] undetermined       As evidenced by: Patient is motivated to improve and hopeful for self in recovery Patient Adjustment to Disability [] coping  [] demonstrates poor adjustment  [] undetermined       As evidenced by:    [] coping  [] demonstrates poor adjustment  [x] undetermined      As evidenced by: Not available on evaluation Family Adjustment to Disability [] coping  [] demonstrates poor adjustment  [] undetermined      As evidenced by:      ASSESSMENT  Clinical Impression: Patient is a very pleasant and cooperative, 78year old,  (five years), retired,  female. She resides alone in [place] residence that is one level with four steps to enter. Patient has two daughters who are supportive: she is especially assisted by one living [place] [id number] whereas another, also supportive, is apparently very preoccupied with a heavy work schedule. Regardless, patient feels that she has good support available to her.   She is able to describe symptoms emerging that culminated in her seeking medical intervention at [facility]. At first, she was dismissive of \"numbing\" that emerged but greater problems followed, soon thereafter. Now, patient is obviously, very motivated to improve and regain function and independence. She reports that she had been entirely independent, operating automobile, and pursuing activities of interest at her own discretion. She will benefit from further education to best understand parameters of expected recovery and in order to identify realistic goals for herself. Emotionally, patient seems calm and composed and not in acute distress; however, she acknowledges feeling stressed and does confirm some feelings of anxiety and depression, perhaps fear and worry. She does not have mental health history nor need for prior services, and she is not currently requiring psychotropic medication for stability. Patient was reassured that feelings of distress with transient anxiety and depression are normal with onset of any medical emergency, and that she will do best focusing on realistic, day to day goals and maintaining good effort and participation. She will be monitored for any significant emotional and/or behavioral difficulties and be encouraged to persevere on ARU. Cognitively, patient is entirely alert and oriented, has good auditory reception and comprehension and seems to be functioning at her baseline of cognitive ability. Yet, she will benefit from cues and prompts to maximize recall with novel and/or complex instruction. Patient Strengths: Alert, oriented, pleasant, cooperative, previously entirely independent and motivated to improve all function    Patient Preferences: Uncertain disposition and dependent on function at discharge    Rehab Potential: Good with excellent motivation to improve    Educational Needs: Under each heading list the specific items in which the patient or family will need education/training.  Example: hip precautions, use of walker, ADL equipment, neglect, judgment, adjustment, etc.     Special considerations or accommodations for teaching:  [x] Yes     [] No     [] NA  If Yes, explain: Stress with sudden onset of illness and forced dependency Discharge Status    Completed Demonstrated/ Verbalized Understanding    Yes No Yes No   Info regarding disability:  [] [] [] []   Adjustment:  [] [] [] []   Cognition:  [] [] [] []   Other: [] [] [] []   Other: [] [] [] []   If education not completed, explain: [] [] [] []     PLAN  Problem: Limited insight about illness and all recovery  Long Term Goal: Increase insight  Intervention: Patient education  At Discharge - LTG Achieved: [] Yes [] No If not achieved, explain:    Problem: Forced dependency in recovery  Long Term Goal: Accept forced dependency versus independence in recovery  Intervention: Patient education and support   At Discharge - LTG Achieved: [] Yes [] No If not achieved, explain:    Problem: Stress with illness  Long Term Goal: Minimize subjective stress and distress  Intervention: Support , as needed  At Discharge - LTG Achieved: [] Yes [] No If not achieved, explain:    Problem: Recall of all treatment information  Long Term Goal: Maximize recall of novel and/or complex instruction  Intervention: Cues and prompts  At Discharge - LTG Achieved: [] Yes [] No If not achieved, explain:    Problem: Self esteem and confidence  Long Term Goal: Maximize self confidence and esteem  Intervention: Positive reinforcement and ego support  At Discharge - LTG Achieved: [] Yes [] No If not achieved, explain:    Dee Watson, [facility]  8/24/2022 3:58 PM    DISCHARGE STATUS    Clinical Impressions: Patient only seen for initial evaluation on admit to ARU as I was not informed of need for follow up on account of significant behavior nor emotional problems occurring during rehabilitation effort.   Patient was able to discharge to home for continued recovery effort and not requiring mental health referral for further stability on discharge.     Follow-up Services Recommended Purpose                 Dee Watson, PHD  Discharge Date/Time:

## 2022-08-24 NOTE — H&P
[facility] PHYSICAL REHABILITATION  [address] [place] [address], [address]     INPATIENT REHABILITATION  HISTORY AND PHYSICAL  (Post Admission Physician Evaluation)    Name: Sarita Barron CSN: [id number]   Age: 78 y.o. MRN: [id number]   Sex: female Admit Date: 8/23/2022           Primary Rehab Impairment Category (BREANN): Neurological     Impairment Group Label: Neuromuscular Disorders    Etiologic Diagnosis: Acute Transverse Myelitis       Subjective:     Patient seen and examined. History of the Present Illness:     53-year-old female with a past medical history of hypertension, lipidemia, uterine cancer presents as a transfer from [facility] for bilateral paresthesias and decreased rectal sensation. 7 days prior to presentation emergency room patient began having tingling in her toes bilaterally. 2 days later patient appreciated numbness across the bottom of the feet prompting her to be seen in the [facility] ER and was advised outpatient follow-up. Following that patient had a mechanical fall when her cane was not done around and after the fall patient started developing rectal numbness. Patient appreciated the numbness when she went to use the restroom and felt like she passed stool but did not. She presented today status post outpatient follow-up with provided concerns for cauda equina versus spinal cord compression necessitating a stat MRI. Initially patient underwent CT lumbar spine without contrast which showed mild/moderate multilevel degenerative changes of the visualized lumbar spine and sacroiliac joints without acute osseous abnormality and diffuse osteopenia. MRI thoracic spine with and without contrast showed short segment intramedullary T2 hyperintense, slightly expansile lesion in the distal cord spanning from T10-T12 with patchy enhancement predominantly in the ventral aspect of the cord. No significant adjacent cord edema and no syrinx formation.   MRI lumbar spine with and without contrast showed short segment intramedullary T2 hyperintense slightly expansile lesion of the distal cord spanning from T10-T12 with patchy enhancement of the ventral cord. Multilevel degenerative changes of the lumbar spine most significant at L4-L5 and mild spinal canal narrowing and effacement of the left greater than right subarticular recess and contact of the traversing L5 nerve root. Brain MRI suggest possible small vessel disease associated with demyelinating process with concern for possible idiopathic transverse myelitis. Neurosurgery and neurology were consulted and patient was diagnosed to have transverse myelitis with an expansile T2 hyperintense lesion with enhancement that spans between T10-T12. Hospital transfers myelitis remains unclear. Patient was treated with IV steroids during her hospital stay. PT OT were consulted. Patient has done well with PT OT with improvement of strength in her extremities. PT and OT recommended that patient be discharged to an acute rehab facility for further evaluation. The patient had remained hemodynamically stable but due to the above events, the patient was noted to have impaired mobility and ADLs. Patient was felt to be a good candidate for acute inpatient rehabilitation. Upon evaluation by Physical Therapy and Occupational Therapy, the patient was recommended for acute inpatient rehabilitation. The patient was discharged and was subsequently admitted to the [facility] for intensive rehabilitation to help recover strength, function and mobility.       Past Medical History:  Past Medical History:   Diagnosis Date    Allergic rhinitis     Cancer ([address] 1995    Uterine Cancer (radiation, surgery)    Diverticulosis     GERD (gastroesophageal reflux disease)     Hypercholesteremia     Hypertension     Steatosis of liver 2015       Past Surgical History:  Past Surgical History:   Procedure Laterality Date    COLONOSCOPY N/A 11/18/2019    COLONOSCOPY with polyp bx performed by Reji Clark MD at [facility]    HX COLONOSCOPY      HX HYSTERECTOMY  1995    HX OTHER SURGICAL      Liver biopsy       Allergies: Allergies   Allergen Reactions    Penicillin G Hives         Social History: The patient is lives at home, non-smoker, nonalcoholic      Family History:  No family history on file. Home Medications (medications taken at home prior to admission to [facility]): Reviewed      Transfer Medications   Prior to Admission Medications   Prescriptions Last Dose Informant Patient Reported? Taking?   aspirin delayed-release 81 mg tablet 8/23/2022  Yes Yes   Sig: Take 81 mg by mouth daily. atorvastatin (LIPITOR) 40 mg tablet 8/23/2022  Yes Yes   Sig: Take 40 mg by mouth daily. calcium 500 mg tab 8/23/2022  Yes Yes   Sig: Take  by mouth.   calcium citrate-vitamin d3 (CITRACAL+D) 315 mg-5 mcg (200 unit) tab 8/23/2022  Yes Yes   Sig: Take 1 tablet by mouth daily (with breakfast). ciprofloxacin HCl (CIPRO) 500 mg tablet Not Taking  No No   Sig: Take 1 Tab by mouth once as needed for up to 1 dose. Patient not taking: Reported on 8/23/2022   fexofenadine (ALLEGRA) 180 mg tablet 8/23/2022  Yes Yes   Sig: Take 180 mg by mouth daily. meloxicam (MOBIC) 15 mg tablet Unknown  Yes No   Sig: Take 15 mg by mouth daily. omeprazole (PRILOSEC) 20 mg capsule Unknown  Yes No   Sig: Take 20 mg by mouth daily. telmisartan (MICARDIS) 40 mg tablet 8/23/2022  Yes Yes   Sig: Take 40 mg by mouth daily. Facility-Administered Medications: None       Review Of Systems:   CONSTITUTIONAL: No weight loss. EYES: No blurred vision and no eye discharge. ENT: No nasal discharge. No ear pain. CARDIOVASCULAR: No chest pain and no diaphoresis. RESPIRATORY: No cough, no hemoptysis. GI: No vomiting, no diarrhea   : No urinary frequency and no dysuria. MUSCULOSKELETAL: No muscle pains. SKIN: No rashes.    NEURO: No dizziness, no numbness. ENDOCRINE: No polyphagia and no polydipsia. HEMATOLOGY: No bleeding tendencies. Objective:     Vital Signs:  Patient Vitals for the past 24 hrs:   BP Temp Pulse Resp SpO2 Height   08/24/22 1521 123/80 97 °F (36.1 °C) 71 18 97 % --   08/24/22 1253 115/72 97.4 °F (36.3 °C) 64 18 98 % --   08/24/22 1228 123/85 97.4 °F (36.3 °C) 63 18 98 % --   08/24/22 1155 114/74 97.4 °F (36.3 °C) 67 16 95 % --   08/24/22 0852 -- -- -- -- -- 5' 4\" (1.626 m)   08/24/22 0727 (!) 160/93 97.2 °F (36.2 °C) 65 16 96 % --   08/23/22 2000 (!) 169/96 97.6 °F (36.4 °C) 74 18 95 % --        Body mass index is 33.47 kg/m². Physical Examination:  General-alert awake oriented, lying in bed, no acute distress. HEENT-pupils reactive bilaterally, clear conjunctiva, no scleral icterus  Chest-CTA BL  CVS-S1-S2 no murmurs regular rate and rhythm  Abdomen-nontender, nondistended, bowel sounds are present. Extremities -no edema pulses are present bilaterally  Neuro-alert awake oriented, lying in bed, no acute distress, patient seen by PT and OT and was able to move a little requiring maximal encouragement to promote functional weightbearing.     Current Medications:  Current Facility-Administered Medications   Medication Dose Route Frequency    atorvastatin (LIPITOR) tablet 40 mg  40 mg Oral QHS    calcium-vitamin D 600 mg-5 mcg (200 unit) per tablet 1 Tablet  1 Tablet Oral BID    cetirizine (ZYRTEC) tablet 5 mg  5 mg Oral DAILY PRN    losartan (COZAAR) tablet 50 mg  50 mg Oral DAILY    metFORMIN (GLUCOPHAGE) tablet 500 mg  500 mg Oral DAILY WITH BREAKFAST    hydroCHLOROthiazide (HYDRODIURIL) tablet 12.5 mg  12.5 mg Oral DAILY    [Held by provider] carvediloL (COREG) tablet 6.25 mg  6.25 mg Oral Q12H    timolol (TIMOPTIC) 0.25% ophthalmic solution  1 Drop Both Eyes BID    fluticasone propionate (FLONASE) 50 mcg/actuation nasal spray 2 Spray  2 Spray Both Nostrils DAILY    insulin lispro (HUMALOG) injection   SubCUTAneous AC&HS glucose chewable tablet 16 g  4 Tablet Oral PRN    glucagon (GLUCAGEN) injection 1 mg  1 mg IntraMUSCular PRN    dextrose 10% infusion 0-250 mL  0-250 mL IntraVENous PRN    docusate sodium (COLACE) capsule 100 mg  100 mg Oral BID    bisacodyL (DULCOLAX) tablet 10 mg  10 mg Oral Q48H PRN       Functional Assessment:     Occupational Therapy   Prior Level of Function  Pre-Admission Screen  Post-Admission Evaluation   Eating   Independent Eating   Independent Eating      Grooming   Independent Grooming   Independent Grooming  Functional Level: 5 (setup EOB)   Upper Body Dressing   Independent Upper Body Dressing   Independent Upper Body Dressing  Functional Level: 5   Lower Body Dressing   Independent Lower Body Dressing   Minimal Assist Lower Body Dressing  Functional Level: 5 (setup EOB)   Bladder Management   Independent Bladder Management   Independent Toileting      Bowel Management   Independent Bowel Management   Minimal Assist      Physical Therapy   Prior Level of Function  Pre-Admission Screen  Post-Admission Evaluation   Ambulation   Independent Ambulation   Minimal Assist Gait  Amount of Assistance:  (Unable at this time)  Distance (ft): 0 Feet (ft)   Bed Mobility   Independent Bed Mobility   Modified Independent Bed/Mat Mobility  Rolling Right : 6 (Modified independent)  Rolling Left : 6 (Modified independent)  Supine to Sit : 5 (Supervision)  Sit to Supine : 5 (Supervision)   Supine to Sit   Independent Supine to Sit   Modified Independent Bed/Mat Mobility  Rolling Right : 6 (Modified independent)  Rolling Left : 6 (Modified independent)  Supine to Sit : 5 (Supervision)  Sit to Supine : 5 (Supervision)   Sit to Stand   Independent Sit to Stand   Modified Independent Bed/Mat Mobility  Rolling Right : 6 (Modified independent)  Rolling Left : 6 (Modified independent)  Supine to Sit : 5 (Supervision)  Sit to Supine : 5 (Supervision)   Bed/Chair Transfers   Independent Bed/Chair Transfers   Contact Guard Assist Transfers  Transfer Type:  Other  Other: squat pivot  Transfer Assistance : 1 (Total assistance) (unable to achieve stand for stand step,, was able to perform lateral transfer with minimal physical assistance and maximal verbal cuing utilizing squat pivot technique with multiple attempts at clearing sacrum as pt does not maintain sacral clearance.)  Sit to Stand Assistance: Unable at this time   Toilet Transfers   Independent Toilet Transfers   Minimal Assist Toilet Transfers  Toilet Transfer Score: 5 (based on sqaut pivot from EOB to w/c)     Speech and Language Pathology  Post-Admission Evaluation     Comprehension (Native Language)  Primary Mode of Comprehension: Auditory     Expression (Native Language)  Primary Mode of Expression: Verbal     Social Interaction/Pragmatics  Score: 4                   Legend:   7 - Independent   6 - Modified Independent   5 - Standby Assistance / Supervision / Set-up   4 - Minimum Assistance / Contact Guard Assistance   3 - Moderate Assistance   2 - Maximum Assistance   1 - Total Assistance / Dependent       Labs on Admission:  Recent Results (from the past 24 hour(s))   GLUCOSE, POC    Collection Time: 08/23/22  4:35 PM   Result Value Ref Range    Glucose (POC) 184 (H) 70 - 110 mg/dL   GLUCOSE, POC    Collection Time: 08/23/22  9:21 PM   Result Value Ref Range    Glucose (POC) 148 (H) 70 - 110 mg/dL   HEMOGLOBIN A1C WITH EAG    Collection Time: 08/24/22  6:08 AM   Result Value Ref Range    Hemoglobin A1c 6.7 (H) 4.2 - 5.6 %    Est. average glucose 146 mg/dL   CBC WITH AUTOMATED DIFF    Collection Time: 08/24/22  6:08 AM   Result Value Ref Range    WBC 10.5 4.6 - 13.2 K/uL    RBC 4.39 4.20 - 5.30 M/uL    HGB 13.6 12.0 - 16.0 g/dL    HCT 39.4 35.0 - 45.0 %    MCV 89.7 78.0 - 100.0 FL    MCH 31.0 24.0 - 34.0 PG    MCHC 34.5 31.0 - 37.0 g/dL    RDW 14.0 11.6 - 14.5 %    PLATELET 694 025 - 979 K/uL    MPV 9.2 9.2 - 11.8 FL    NRBC 0.0 0  WBC    ABSOLUTE NRBC 0.00 0.00 - 0.01 K/uL    NEUTROPHILS 65 40 - 73 %    LYMPHOCYTES 24 21 - 52 %    MONOCYTES 9 3 - 10 %    EOSINOPHILS 0 0 - 5 %    BASOPHILS 0 0 - 2 %    IMMATURE GRANULOCYTES 1 (H) 0.0 - 0.5 %    ABS. NEUTROPHILS 6.9 1.8 - 8.0 K/UL    ABS. LYMPHOCYTES 2.6 0.9 - 3.6 K/UL    ABS. MONOCYTES 1.0 0.05 - 1.2 K/UL    ABS. EOSINOPHILS 0.0 0.0 - 0.4 K/UL    ABS. BASOPHILS 0.0 0.0 - 0.1 K/UL    ABS. IMM. GRANS. 0.1 (H) 0.00 - 0.04 K/UL    DF AUTOMATED     METABOLIC PANEL, BASIC    Collection Time: 08/24/22  6:08 AM   Result Value Ref Range    Sodium 140 136 - 145 mmol/L    Potassium 3.4 (L) 3.5 - 5.5 mmol/L    Chloride 104 100 - 111 mmol/L    CO2 29 21 - 32 mmol/L    Anion gap 7 3.0 - 18 mmol/L    Glucose 146 (H) 74 - 99 mg/dL    BUN 30 (H) 7.0 - 18 MG/DL    Creatinine 0.76 0.6 - 1.3 MG/DL    BUN/Creatinine ratio 39 (H) 12 - 20      GFR est AA >60 >60 ml/min/1.73m2    GFR est non-AA >60 >60 ml/min/1.73m2    Calcium 8.7 8.5 - 10.1 MG/DL   MAGNESIUM    Collection Time: 08/24/22  6:08 AM   Result Value Ref Range    Magnesium 2.3 1.6 - 2.6 mg/dL   GLUCOSE, POC    Collection Time: 08/24/22  7:31 AM   Result Value Ref Range    Glucose (POC) 131 (H) 70 - 110 mg/dL   GLUCOSE, POC    Collection Time: 08/24/22 11:56 AM   Result Value Ref Range    Glucose (POC) 127 (H) 70 - 110 mg/dL           Assessment:     Primary Rehabilitation Diagnosis  1. Impaired Mobility and ADLs  2. Idiopathic transverse myelitis  3. History of hypertension  4. History of hyperlipidemia    Comorbidities  Patient Active Problem List   Diagnosis Code    History of uterine cancer Z85.42    Hydronephrosis N13.30    Idiopathic transverse myelitis ([facility] [address] G37.3       Willingness to participate in the program: Good      Rehabilitation Potential: Good      Plan:     1. Medical Issues being followed closely:    [x]  Fall and safety precautions     []  Wound Care     [x]  Bowel and Bladder Function     [x]  Fluid Electrolyte and Nutrition Balance     []  Pain Control      2. Issues that 24 hour rehabilitation nursing is following:    [x]  Fall and safety precautions     []  Wound Care     [x]  Bowel and Bladder Function     [x]  Fluid Electrolyte and Nutrition Balance     []  Pain Control      [x]  Assistance with and education on in-room safety with transfers to and from the bed, wheelchair, toilet and shower. 3. Acute rehabilitation plan of care:    [x]  Patient to be evaluated and treated by:           [x]  Physical Therapy           [x]  Occupational Therapy           []  Speech Therapy     []  Hold Rehab until further notice     5. Medications:    [x]  MAR Reviewed     [x]  Continue Present Medications     6. DVT Prophylaxis:      []  Lovenox     []  Unfractionated Heparin     []  Coumadin     []  NOAC     []  JACE Stockings     []  Sequential Compression Device     []  None     7. Rehabilitation program and expectations from patient, as well as medical issues discussed with the patient. Idiopathic transverse myelitis  History of hypertension  History of type 2 diabetes    Idiopathic transverse myelitis-patient treated with high-dose IV steroids, MRI brain showed demyelination, suggestive of transverse myelitis, patient underwent an MRI of the thoracic as well as the lumbar spine, results attached, she also underwent a lumbar puncture which was noted to be negative. Patient was seen by PT and OT and is currently being discharged to acute rehab facility    Type 2 diabetes-A1c 6.8-patient started on metformin, will continue to monitor blood sugars. History of hypertension-patient's chronic home medications were resumed, continue to monitor. PRECAUTIONS:   1. Safety/fall precautions. 2. Deep venous thrombosis precautions. 3. Aspiration precautions. POTENTIAL BARRIERS TO DISCHARGE:   1. Risk for falls. 2. The patient lives alone. 3. Current home layout. 4. Family limited in ability to provide constant care. 5. Limited community resources.       RELEVANT CHANGES SINCE PREADMISSION SCREENING: I have compared the patients medical and functional status at the time of the pre-admission screening and on this post-admission evaluation. The preadmission screen and findings from therapy evaluations both support my post admission physician evaluation, deeming this patient to be an appropriate candidate for the IRF. The patient requires multidisciplinary treatment, physician oversight and intensive therapy not provided at a lower level of care. By signing this document, I acknowledge that I have personally performed a full physical examination on this patient within 24 hours of admission to this inpatient rehabilitation facility and have determined the patient to be able to tolerate the above course of treatment at an intensive level for a reasonable period of time. The Medical Director (or covering physician) will be completing a detailed individualized plan of care for this patient by day #4 of the patients stay based upon the Pre-Admission Screen, the Post-Admission Evaluation, and the therapy evaluations.       Signed:    Cosme Em MD    August 24, 2022

## 2022-08-24 NOTE — ACP (ADVANCE CARE PLANNING)
Advance Care Planning   Advance Care Planning Inpatient Note  [address] [facility] Department    Today's Date: 8/24/2022  Unit: [facility]  REHAB UNIT    Received request from patient. Upon review of chart and communication with care team, patient's decision making abilities are not in question. Patient was/were present in the room during visit. Goals of ACP Conversation:  Discuss Advance Care planning documents    Health Care Decision Makers:    No healthcare decision makers have been documented. Click here to complete [address] including selection of the Healthcare Decision Maker Relationship (ie \"Primary\")  Summary:  Completed [address] [place] [address] (Patient Wishes) on file:  Healthcare Power of /Advance Directive appointment of Health care agent     Assessment:    Patient requested to do an advance directive naming her daughters as her agents. Completed form, copied and charted same. Mac Briseno to patient for agents.     Interventions:  Assisted in the completion of documents according to patient's wishes at this time    Care Preferences Communicated:  No    Outcomes/Plan:  New Advance Directive completed    Mariaelena Munroe [facility] on 8/24/2022 at 11:18 AM

## 2022-08-24 NOTE — PROGRESS NOTES
[x] Psychology  [] Social Work [] Recreational Therapy    INTERVENTION  UNITS/TIME OF SERVICE   Assessment  August 24, 2022   Supportive Counseling    Orientation    Discharge Planning    Resource Linkage              Progress/Current Status    Patient seen for Psychological Evaluation as requested on admission to ARU by MD.  Patient found sitting upright in wheelchair in bedroom, describing herself as feeling tired after PT but entirely pleasant and cooperative. She is able to describe sudden onset of symptoms resulting in her admit to [facility] and now transfer to ARU, indicating that she has been entirely independent and operating automobile prior to diagnosis for TM. Patient is obviously highly motivated to improve and regain functional strength and capabilities. She reports subjective stress and some distress with illness and loss of function, but otherwise denies history of mood disturbances and has not received prior mental health services, nor is she currently Rx psychotropic medication for stability. Patient will be monitored for any possible, emergent, emotional and/or behavioral difficulties while on ARU and be encouraged to persevere.     Dee Watson, THE [facility] 8/24/2022 3:55 PM

## 2022-08-24 NOTE — PROGRESS NOTES
Problem: Falls - Risk of  Goal: *Absence of Falls  Description: Document Yi Kathleen Fall Risk and appropriate interventions in the flowsheet.   Outcome: Progressing Towards Goal  Note: Fall Risk Interventions:  Mobility Interventions: OT consult for ADLs         Medication Interventions: Bed/chair exit alarm    Elimination Interventions: Call light in reach, Bed/chair exit alarm, Elevated toilet seat    History of Falls Interventions: Room close to nurse's station         Problem: Patient Education: Go to Patient Education Activity  Goal: Patient/Family Education  Outcome: Progressing Towards Goal     Problem: Pain  Goal: *Control of Pain  Outcome: Progressing Towards Goal     Problem: Patient Education: Go to Patient Education Activity  Goal: Patient/Family Education  Outcome: Progressing Towards Goal

## 2022-08-24 NOTE — CONSULTS
Comprehensive Nutrition Assessment    Type and Reason for Visit: Initial, Positive nutrition screen, Consult    Nutrition Recommendations/Plan:   No nutrition intervention indicated at this time. Will re-screen as appropriate. Malnutrition Assessment:  Malnutrition Status:  Insufficient data (08/24/22 8643)      Nutrition Assessment:    Pt admitted for therapy due to idiopathic transverse myelitis; pt admitted from [facility]. Is on po diet; unable to assess meal intake. Will continue to monitor. Noted wt gain since 2019 per chart hx. Pt unavailable at this time. Nutrition Related Findings:    BM 8/22. no edema. Pertinent meds:  calcium- vitamin D, bowel regimen, SSI, metformin. Wound Type: None    Current Nutrition Intake & Therapies:  Average Meal Intake: Unable to assess  Average Supplement Intake: None ordered  ADULT DIET Regular; 4 carb choices (60 gm/meal); Low Fat/Low Chol/High Fiber/2 gm Na    Anthropometric Measures:  Height: 5' 4\" (162.6 cm)  Ideal Body Weight (IBW): 120 lbs (55 kg)  Admission Body Weight: 195 lb 1.7 oz  Current Body Wt:  88.5 kg (195 lb 1.7 oz), 162.6 % IBW. Bed scale  Current BMI (kg/m2): 33.5  Usual Body Weight: 88.5 kg (195 lb)  % Weight Change (Calculated): 0.1  Weight Adjustment: No adjustment  BMI Category: Obese class 1 (BMI 30.0-34. 9)    Estimated Daily Nutrient Needs:  Energy Requirements Based On: Formula (MSJ x1-1.2)  Weight Used for Energy Requirements: Admission  Energy (kcal/day): 3330-3683  Weight Used for Protein Requirements: Admission  Protein (g/day): 71-89 (wt x0.8-1)  Method Used for Fluid Requirements: 1 ml/kcal  Fluid (ml/day): 3808-2861    Nutrition Diagnosis:   No nutrition diagnosis at this time     Nutrition Interventions:   Food and/or Nutrient Delivery: Continue current diet, Mineral supplement, Vitamin supplement  Nutrition Education/Counseling: Education not indicated  Coordination of Nutrition Care: Continue to monitor while inpatient       Goals:     Goals: Meet at least 75% of estimated needs, by next RD assessment       Nutrition Monitoring and Evaluation:   Behavioral-Environmental Outcomes: None identified  Food/Nutrient Intake Outcomes: Food and nutrient intake, Vitamin/mineral intake  Physical Signs/Symptoms Outcomes: Biochemical data, Meal time behavior    Discharge Planning:     Too soon to determine    Vishnu Cavazos N [address] [place] [address]  Contact: [phone]

## 2022-08-24 NOTE — PROGRESS NOTES
08/24/22 1155   Vital Signs   Temp 97.4 °F (36.3 °C)   Temp Source Oral   Pulse (Heart Rate) 67   Heart Rate Source Monitor   Resp Rate 16   O2 Sat (%) 95 %   Level of Consciousness Alert (0)   /74   MAP (Calculated) 87   BP 1 Method Automatic   BP 1 Location Right upper arm   BP Patient Position Supine   1155 : AM patient was sitting on bedside commode and became weak and unresponsive for couple seconds and Diaphoretic. Patient had large BM. Patients blood glucose 127. Patient assisted to bed , patient alert and orientated x4 NP Román at bedside assessing patient. Patient denies pain denies SOB,Patient supine in bed resting quietly. Will continue to monitor patient closely. .  1600:PM patient having frequent urination ,MD Monsalve notified of PVR  greater than 300. No new orders given at this time,MD stated it could be part of exacerbation of Transverse Myelitis, MD stated to monitor patient closely and if PVR continues to be elevated let him know and he would order insert rdz catheter.

## 2022-08-24 NOTE — ROUTINE PROCESS
Bedside and Verbal shift change report given to Becca Yates LPN (oncoming nurse) by Floyd RN (offgoing nurse). Report included the following information SBAR, Intake/Output, and MAR.

## 2022-08-24 NOTE — ROUTINE PROCESS
SHIFT CHANGE NOTE FOR [facility]    Bedside and Verbal shift change report given to MISHA Guadarrama  (oncoming nurse) by Jyoti Mir RN (offgoing nurse). Report included the following information SBAR, Kardex, MAR and Recent Results. Situation:   Code Status: Full Code   Hospital Day: 1   Problem List:   Hospital Problems  Date Reviewed: 11/18/2019            Codes Class Noted POA    Idiopathic transverse myelitis ([facility] [address] ICD-10-CM: G37.3  ICD-9-CM: 341.22  8/23/2022 Unknown           Background:   Past Medical History:   Past Medical History:   Diagnosis Date    Allergic rhinitis     Cancer ([facility] [address] 1995    Uterine Cancer (radiation, surgery)    Diverticulosis     GERD (gastroesophageal reflux disease)     Hypercholesteremia     Hypertension     Steatosis of liver 2015        Assessment:  Changes in Assessment throughout shift: Pt has been urine every 15 minutes. MD is notified.          Last Vitals:     Vitals:    08/24/22 1155 08/24/22 1228 08/24/22 1253 08/24/22 1521   BP: 114/74 123/85 115/72 123/80   Pulse: 67 63 64 71   Resp: 16 18 18 18   Temp: 97.4 °F (36.3 °C) 97.4 °F (36.3 °C) 97.4 °F (36.3 °C) 97 °F (36.1 °C)   SpO2: 95% 98% 98% 97%   Weight:       Height:           PAIN    Pain Assessment    Pain Intensity 1: 0 (08/24/22 1600) Pain Intensity 1: 2 (12/29/14 1105)      Pain Location 1: Abdomen      Pain Intervention(s) 1: Medication (see MAR)  Patient Stated Pain Goal: 0 Patient Stated Pain Goal: 0  Intervention effective: yes  Other actions taken for pain:      Skin Assessment  Skin color    Condition/Temperature    Integrity    Turgor    Weekly Pressure Ulcer Documentation  Pressure  Injury Documentation: No Pressure Injury Noted-Pressure Ulcer Prevention Initiated  Wound Prevention & Protection Methods  Orientation of wound Orientation of Wound Prevention: Posterior  Location of Prevention Location of Wound Prevention: Buttocks, Sacrum/Coccyx  Dressing Present Dressing Present : No  Dressing Status    Wound Offloading Wound Offloading (Prevention Methods): Wheelchair    Wound Prevention Checklist  Precautions:      []  Heel prevention boots placed on patient    [x]  Patient turned q2h during shift    [x]  [place] Order Set ordered    []  Patient on [place] bed/Specialty bed    [x]  Each Wound is documented during shift (Stage, Color, drainage, odor, measurements, and dressings)    [x]  Dual skin checks done at bedside during shift report with Imer Guadarrama   INTAKE/OUPUT  Date 08/23/22 1900 - 08/24/22 0659 08/24/22 0700 - 08/25/22 0659   Shift 4764-5740 24 Hour Total 7217-7176 8570-8118 24 Hour Total   INTAKE   P.O.   730  730     P. O.   730  730   Shift Total(mL/kg)   730(8.3)  730(8.3)   OUTPUT   Urine(mL/kg/hr)   350(0.3)  350     Urine Voided   350  350     Urine Occurrence(s) 0 x 1 x 8 x  8 x   Stool          Stool Occurrence(s) 0 x 0 x 3 x  3 x   Shift Total(mL/kg)   350(4)  350(4)   NET   380  380   Weight (kg) 88.5 88.5 88.5 88.5 88.5       Recommendations:  Patient needs and requests: ask for toilet assistant     Pending tests/procedures:     Functional Level/Equipment: Partial (one person) / Daniela Poser; Wheelchair    Fall Precautions:   Fall risk precautions were reinforced with the patient; she was instructed to call for help prior to getting up. The following fall risk precautions were continued: bed/ chair alarms, door signage, yellow bracelet and socks as well as update of the Lolly Blood tool in the patient's room. Jose J Score: 4    HEALS Safety Check    A safety check occurred in the patient's room between off going nurse and oncoming nurse listed above. The safety check included the below items  Area Items   H  High Alert Medications Verify all high alert medication drips (heparin, PCA, etc.)   E  Equipment Suction is set up for ALL patients (with yanker)  Red plugs utilized for all equipment (IV pumps, etc.)  WOWs wiped down at end of shift.   Room stocked with oxygen, suction, and other unit-specific supplies A  Alarms Bed alarm is set for fall risk patients  Ensure chair alarm is in place and activated if patient is up in a chair   L  Lines Check IV for any infiltration  Valle bag is empty if patient has a Valle   Tubing and IV bags are labeled   S  Safety   Room is clean, patient is clean, and equipment is clean. Hallways are clear from equipment besides carts. Fall bracelet on for fall risk patients  Ensure room is clear and free of clutter  Suction is set up for ALL patients (with ryan)  Hallways are clear from equipment besides carts.    Isolation precautions followed, supplies available outside room, sign posted     Aria Garcia RN

## 2022-08-24 NOTE — INTERDISCIPLINARY ROUNDS
[facility] PHYSICAL REHABILITATION  [address] [place] [address], [address]    INPATIENT REHABILITATION  PRE-TEAM CONFERENCE SUMMARY     Date of Conference: 8/25/2022    Patient Information:        Name: Jose A Bhagat Age / Sex: 78 y.o. / female   CSN: [id number] MRN: [id number]   Admit Date: 8/23/2022 Length of Stay: 1 days     Primary Rehabilitation Diagnosis  1. Impaired Mobility and ADLs  2. Idiopathic Transverse Myelitis     Comorbidities  Patient Active Problem List   Diagnosis Code    History of uterine cancer Z85.42    Hydronephrosis N13.30    Idiopathic transverse myelitis (HCC) G37.3         Therapy:     FIM SCORES Initial Assessment Weekly Progress Assessment 8/24/2022   Eating       Swallowing     Grooming 5 (setup EOB) 5 (setup EOB)   Bathing 4 (4 without LHS and 5 with LHS) 4 (4 without LHS and 5 with LHS)   Upper Body Dressing Functional Level: 5  Items Applied/Steps Completed: Pullover (4 steps) (setup EOB) Functional Level: 5  Items Applied/Steps Completed: Pullover (4 steps) (setup EOB)   Lower Body Dressing Functional Level: 5 (setup EOB)  Items Applied/Steps Completed: Sock, left (1 step), Sock, right (1 step), Underpants (3 steps), Elastic waist pants (3 steps)  Comments:  (pt managed pants over hips long sitting in bed) Functional Level: 5 (setup EOB)  Items Applied/Steps Completed: Sock, left (1 step); Sock, right (1 step); Underpants (3 steps); Elastic waist pants (3 steps)  Comments:  (pt managed pants over hips long sitting in bed)   Toileting       Bladder 0 0   Bowel  0 0   Toilet Transfer [place] Toilet Transfer Score: 5 (based on sqaut pivot from EOB to w/c) Toilet Transfer Score: 5 (based on sqaut pivot from EOB to w/c)   Tub/Shower Transfer [place] Tub or Shower Type: Tub/Shower combination Tub or Shower Type: Tub/Shower combination     Comprehension Primary Mode of Comprehension: Auditory Primary Mode of Comprehension: Auditory     Expression Primary Mode of Expression: Verbal Primary Mode of Expression: Verbal   Social Interaction Score: 4 Score: 4   Problem Solving       Memory         FIM SCORES Initial Assessment Weekly Progress Assessment 8/24/2022   Bed/Chair/Wheelchair Transfers Transfer Type: Other  Other: squat pivot  Transfer Assistance : 1 (Total assistance) (unable to achieve stand for stand step,, was able to perform lateral transfer with minimal physical assistance and maximal verbal cuing utilizing squat pivot technique with multiple attempts at clearing sacrum as pt does not maintain sacral clearance.)  Sit to Stand Assistance: Unable at this time Transfer Type:  Other  Other: squat pivot  Transfer Assistance : 1 (Total assistance) (unable to achieve stand for stand step,, was able to perform lateral transfer with minimal physical assistance and maximal verbal cuing utilizing squat pivot technique with multiple attempts at clearing sacrum as pt does not maintain sacral clearance.)  Sit to Stand Assistance: Unable at this time   Bed Mobility Rolling Right 6 (Modified independent)   Rolling Left 6 (Modified independent)   Supine to Sit 5 (Supervision)   Sit to Stand Unable at this time   Sit to Supine 5 (Supervision)    Rolling Right   6 (Modified independent)   Rolling Left   6 (Modified independent)   Supine to Sit   5 (Supervision)   Sit to Stand   Unable at this time   Sit to Supine   5 (Supervision)      Locomotion (W/C) Able to Propel (ft): 186 feet  Functional Level: 4  Curbs/Ramps Assist Required (FIM Score): 0 (Not tested)  Wheelchair Setup Assist Required : 3 (Moderate assistance)  Wheelchair Management: Manages left brake, Manages right brake Function 4  Setup Assistance  3 (Moderate assistance)      Locomotion (W/C distance)   186 feet   Locomotion (Walk)  (Unable at this time)  (Unable at this time)      Locomotion (Walk dist.) 0 Feet (ft) 0 Feet (ft)   Steps/Stairs Steps/Stairs Ambulated (#): 0  Level of Assist :  (Unable at this time)  Unable at this time         Nursing:     Neuro:   AAA&O x  4          Respiratory:   [x] WNL   [] O2 LPM:   Other:  Peripheral Vascular:   [] TEDS present   [x] Edema present: BLE trace Grade   Cardiac:   [x] WNL   [] Other  Genitourinary:   [] continent   [x] incontinent   [] rdz  Abdominal - retaining urine LBM 8/24  GI: _Reg_4 carb choices_ Diet _thin__ Liquids _____ tube feeds  Musculoskeletal: ____ ROM Transfers _wheelchair_ Assistive Device Used  _x1___ Level of Assistance  Skin Integumentary:   [x] Intact   [] Not Intact   __________Preventative Measures  Details_ excoriation in groin  Pain: [x] Controlled   [] Not Controlled   Pain Meds:   [] Scheduled   [] PRN        Interdisciplinary Team Goals:     1. Discipline  Physical Therapy    Goal  Encourage pt to perform lateral transfers maintaining anterior weight shift for B LE weight bearing to assist with functional strengthening and improved sacral clearance. Barrier  Impaired B LE strength, Impaired sensation, Impaired balance    Intervention  Education, NMRE, Functional strength and balance training    Goal written by:   Edward Fontenot PT, DPT     2. Discipline  Occupational Therapy    Goal  Pt will perform shower transfer using proper squat pivot technique with SBA. Barrier  Impaired BLE strength, balance , sensation, and     Intervention  Therex, Theract, Self Care Retraining, Education    Goal written by:  BALJIT Vickers/L      3. Discipline  Speech Therapy    Goal      Barrier      Intervention      Goal written by:       4. Discipline  Nursing    Goal  Patient empties bladder completely     Barrier  Patient is experiencing urinary retention     Intervention  PVR bladder scan Q6 and bladder training     Goal written by: Jackson Merino RN     5.   Discipline  Clinical Psychology    Goal  Maintain mood stability during treatment effort    Barrier  Stress with sudden onset illness and loss of independence    Intervention  Patient education and support , as needed    Goal written by:  Anna Montenegro, PhD         Disposition / Discharge Planning: Follow-up services:  [x] Physical Therapy             [] Occupational Therapy       [] Speech Therapy           [] Skilled Nursing      [] Medical Social Worker   [] Aide        [] Outpatient      [] vs   [x] Home Health  [] vs       [] to progress to outpatient       [] with 24-hour supervision       [] with 24-hour assistance   [] [facility] recommendations:  Transfer board, 20\" wheelchair   Estimated discharge date:  tbd   Discharge Location:  [x] Home  [] versus    [] [facility]    [] [address]   [] Other:           Electronic Signatures:      Signature Date Signed   Physical Therapist    Paradise Encarnacion PT, DPT  8/24/2022   Occupational Therapist    Velva Goodell, MSOTR/L   8/25/22   Speech Therapist         Nursing    Brodie Reyes, MISHA  8/25/2022   Clinical Psychologist    Anna Montenegro, PhD  8/24/2022    Physician    Jamaica Das MD  8/25/2022       ALEX Olivares 8/24/2022     Opportunity to share with family/caregiver[] YES [] NO    Relationship to patient____________________________________________________      The above information has been reviewed with the patient in a language that they can understand. Opportunity for comments and questions has been provided and a signed attestation has been scanned into the \"media tab\" of the EMR.       Patient Signature: ______________________________________________________    Date Signed: __________________________________________________________

## 2022-08-24 NOTE — PROGRESS NOTES
conducted an initial consultation and Spiritual Assessment for Amita Lorenzo, who is a 78 y.o.,female. Patients Primary Language is: [place]. According to the patients EMR Islam Affiliation is: [place]. The reason the Patient came to the hospital is:   Patient Active Problem List    Diagnosis Date Noted    Idiopathic transverse myelitis ([facility] [address] 08/23/2022    History of uterine cancer 03/13/2015    Hydronephrosis 03/13/2015        The  provided the following Interventions:  Initiated a relationship of care and support. Explored issues of lauren, belief, spirituality and Denominational/ritual needs while hospitalized. Listened empathically. Provided chaplaincy education. Provided information about Spiritual Care Services. Offered prayer and assurance of continued prayers on patients behalf. Chart reviewed. The following outcomes were achieved:  Patient shared limited information about both their medical narrative and spiritual journey/beliefs. Patient processed feeling about current hospitalization. Patient expressed gratitude for pastoral care visit. Assessment:  Patient does not have any Denominational/cultural needs that will affect patients preferences in health care. There are no further spiritual or Denominational issues which require Spiritual Care Services interventions at this time. Plan:  Chaplains will continue to follow and will provide pastoral care on an as needed/requested basis    . Aleksandra Memos   Board Certified [address] [place] [address]   [phone]  conducted an initial consultation and Spiritual Assessment for Amita Lorenzo, who is a 78 y.o.,female. Patients Primary Language is: [place]. According to the patients EMR Islam Affiliation is: [place].      The reason the Patient came to the hospital is:   Patient Active Problem List    Diagnosis Date Noted    Idiopathic transverse myelitis ([facility] [address] 08/23/2022    History of uterine cancer 03/13/2015    Hydronephrosis 03/13/2015        The  provided the following Interventions:  Initiated a relationship of care and support with patient in room 181 of the rehab unit. Listened empathically as patient shared her  story of why she is here in the rehab and request for completing an advance directive. Assisted patient with the completion of the form, copied and charted form with copies given to patient for agents. Provided information about Spiritual Care Services. Offered prayer on patients behalf. The following outcomes were achieved:  Patient shared limited information about both their medical narrative and spiritual journey/beliefs. Patient processed feeling about current hospitalization. Patient expressed gratitude for pastoral care visit. Assessment:  Patient does not have any Hinduism/cultural needs that will affect patients preferences in health care. There are no further spiritual or Hinduism issues which require Spiritual Care Services interventions at this time. Plan:  Chaplains will continue to follow and will provide pastoral care on an as needed/requested basis    . Jocelyn Guthrie   Spiritual Care   [phone]

## 2022-08-24 NOTE — PROGRESS NOTES
Problem: Self Care Deficits Care Plan (Adult)  Goal: *Therapy Goal (Edit Goal, Insert Text)  Description: Occupational Therapy Goals   Long Term Goals  Initiated 2022 and to be accomplished within 2-3 week(s) 2022  1. Pt will perform self-feeding with Deb. 2. Pt will perform grooming with Deb. 3. Pt will perform UB bathing with Deb  4. Pt will perform LB bathing with Deb. 5. Pt will perform tub/shower transfer with Deb. 6. Pt will perform UB dressing with Deb. 7. Pt will perform LB dressing with Deb. 8. Pt will perform toileting task with Deb. 9. Pt will perform toilet transfer with Deb. 10.  Pt will perform an IADL task with good safety and Deb. Short Term Goals   Initiated 2022 and to be accomplished within 7 day(s) 2022  1. Pt will perform self-feeding with S   2. Pt will perform grooming with S.  3. Pt will perform UB bathing with S.  4. Pt will perform LB bathing with S  5. Pt will perform tub/shower transfer with S.  6. Pt will perform UB dressing with S.  7. Pt will perform LB dressing with S  8. Pt will perform toileting task with S.  9. Pt will perform toilet transfer with [address] THERAPY EVALUATION    Patient: Heavenly Lora 78 y.o. Date: 2022  Primary Diagnosis: Idiopathic transverse myelitis ([facility] [G37.3]    Patient identified with name and : Yes     Past Medical History:   Past Medical History:   Diagnosis Date    Allergic rhinitis     Cancer ([facility]     Uterine Cancer (radiation, surgery)    Diverticulosis     GERD (gastroesophageal reflux disease)     Hypercholesteremia     Hypertension     Steatosis of liver      Precautions: Fall    Time In: 0730  Time Out[de-identified] 0900    Pain:  Pt reports 0/10 pain or discomfort prior to treatment. Pt reports 0/10 pain or discomfort post treatment. SUBJECTIVE:   Patient stated Mauricio Valdivia had a controlled fall 3 days ago.     OBJECTIVE DATA SUMMARY:   OT introduction performed with purpose of visit discussed. Pt agreeable to Evaluation.      [x]  Right hand dominant   []  Left hand dominant    Therapy Diagnosis:   Difficulty with ADLs  [x]     Difficulty with functional transfers  [x]     Difficulty with ambulation  [x]     Difficulty with IADLs  [x]       Problem List:    Decreased strength B UE  [x]     Decreased strength trunk/core  []     Decreased AROM   []     Decreased endurance  []     Decreased balance sitting  []     Decreased balance standing  [x]     Pain   []     Decreased PROM  []       Functional Limitations:   Decreased independence with ADL  [x]     Decreased independence with functional transfers  [x]     Decreased independence with ambulation  [x]     Decreased independence with IADL  [x]       Previous Functional Level: Pre-Morbid Level of Function: I with ADLs    Home Environment: Home Situation  Home Environment: Private residence  # Steps to Enter: 4  Rails to Enter: Yes  Hand Rails : Bilateral  One/Two Story Residence: One story  Living Alone: No  Support Systems: Other Family Member(s)  Patient Expects to be Discharged to[de-identified] Home  Current DME Used/Available at Home: None  Tub or Shower Type: Tub/Shower combination    Barriers to Learning/Limitations: None  Compensate with: visual, verbal, tactile, kinesthetic cues/model    Outcome Measures:      MMT Initial Assessment   Right Upper Extremity  Left Upper Extremity    UE AROM WFL ROM; MMT 4+/5 grossly WFL ROM; MMT 4+/5 grossly   Shoulder flexion     Shoulder extension     Shoulder ABDuction     Shoulder ADDUction     Elbow Flexion     Elbow Extension     Wrist Extension/Flexion          0/5 No palpable muscle contraction  1/5 Palpable muscle contraction, no joint movement  2-/5 Less than full range of motion in gravity eliminated position  2/5 Able to complete full range of motion in gravity eliminated position  2+/5 Able to initiate movement against gravity  3-/5 More than half but not full range of motion against gravity  3/5 Able to complete full range of motion against gravity  3+/5 Completes full range of motion against gravity with minimal resistance  4-/5 Completes full range of motion against gravity with minimal resistance  4/5 Completes full range of motion against gravity with moderate resistance  5/5 Completes full range of motion against gravity with maximum resistance    Sensation: Appears intact at BUE reporting numbness at BLE  Coordination: Appears Intact    FIM SCORES Initial Assessment   Bladder - level of assist     Bladder - accident frequency score     Bowel - level of assist     Bowel - accident frequency score     Please see IRC Interdisciplinary Eval: Coordination/Balance Section for details regarding FIM score description.     COGNITION/PERCEPTION Initial Assessment   Reading Status Literate   Writing Status WFL   Arousal/Alertness Generalized responses   Orientation Level Oriented X4   Visual Fields     Praxis     Body Scheme       COMPREHENSION MODE Initial Assessment   Primary Mode of Comprehension Auditory   Hearing Aide None   Corrective Lenses Reading glasses   Score       EXPRESSION Initial Assessment   Primary Mode of Expression Verbal   Score     Comments       SOCIAL INTERACTION/PRAGMATICS Initial Assessment   Score 4   Comments       PROBLEM SOLVING Initial Assessment   Score 5    Comments       MEMORY Initial Assessment   Score  5   Comments       EATING Initial Assessment   Functional Level 5    Comments       GROOMING Initial Assessment   Functional Level 5 (setup EOB)    Oral Hygiene FIM:5 setup EOB   Tasks completed by patient Washed face, Washed hands, Brushed teeth   Comments       BATHING Initial Assessment   Functional Level 4 (4 without LHS and 5 with LHS)   Body parts patient bathed Abdomen, Arm, left, Arm, right, Buttocks, Chest, Lower leg and foot, left, Lower leg and foot, right, Precious area, Thigh, left   Comments       TUB/SHOWER TRANSFER INDEPENDENCE Initial Assessment  0 not addressed 2/2 to safety concerns   Score     Comments       UPPER BODY DRESSING/UNDRESSING Initial Assessment   Functional Level 5   Items applied/Steps completed Pullover (4 steps) (setup EOB)   Comments         LOWER BODY DRESSING/UNDRESSING Initial Assessment   Functional Level 5 (setup EOB)    Sock and/or Shoe Management FIM:5 bringing BLE to bed    Items applied/Steps completed Sock, left (1 step), Sock, right (1 step), Underpants (3 steps), Elastic waist pants (3 steps)   Comments  (pt managed pants over hips long sitting in bed)     TOILETING Initial Assessment   Functional Level Not addressed at this time. Pt declined    Comments       TOILET TRANSFER INDEPENDENCE Initial Assessment   Transfer score 5 (based on sqaut pivot from EOB to w/c)   Comments       INSTRUMENTAL ADL Initial Assessment (PLOF)   Meal preparation Independent   Homemaking Independent   Medicine Management Independent   Financial Management Independent        ASSESSMENT :  Based on the objective data described above, the patient presents with decreased BUE strength, standing balance, and sensation in BLE impacting I in ADLs. Pt would benefit from skilled occupational therapy in order to improve independent functional mobility/ADLs,/IADLs within the home. Interventions may include range of motion (AROM, PROM B UE), motor function (B UE/ strengthening/coordination), activity tolerance (vitals, oxygen saturation levels), balance training, ADL/IADL training and functional transfer training. Please see IRC; Interdisciplinary Eval, Care Plan, and Patient Education for further information regarding occupational therapy examination and plan of care.       PLAN :  Recommendations and Planned Interventions:  [x]               Self Care Training                   [x]        Therapeutic Activities  [x]               Functional Mobility Training    [x]        Cognitive Retraining  [x]               Therapeutic Exercises            [x]        Endurance Activities  [x]               Balance Training                     [x]        Neuromuscular Re-Education  [x]               Visual/Perceptual Training      []   Home Safety Training  [x]               Patient Education                    []        Family Training/Education  []               Other (comment):    Frequency/Duration: Patient will be followed by occupational therapy 1-2 times per day/4-7 days per week to address goals. Discharge Recommendations: Home Health with asst  Further Equipment Recommendations for Discharge: bedside commode, transfer bench     Please refer to the flow sheet for vital signs taken during this treatment. After treatment:   [x] Patient left in no apparent distress sitting up in chair  [] Patient left in no apparent distress in bed  [] Call bell left within reach  [] Nursing notified  [] Caregiver present  [] Bed alarm activated    COMMUNICATION/EDUCATION:   [] Home safety education was provided and the patient/caregiver indicated understanding. [x] Patient/family have participated as able in goal setting and plan of care. [] Patient/family agree to work toward stated goals and plan of care. [] Patient understands intent and goals of therapy, but is neutral about his/her participation. [] Patient is unable to participate in goal setting and plan of care. Order received from MD for occupational therapy services and chart reviewed. Pt to be seen 5 times per week for 3 hours of total therapy per day for 2-3 weeks.   Thank you for the referral.    Thank you for this referral.  Leesa Mao OT      Outcome: Progressing Towards Goal  Goal: Interventions  Outcome: Progressing Towards Goal

## 2022-08-25 LAB
GLUCOSE BLD STRIP.AUTO-MCNC: 119 MG/DL (ref 70–110)
GLUCOSE BLD STRIP.AUTO-MCNC: 126 MG/DL (ref 70–110)
GLUCOSE BLD STRIP.AUTO-MCNC: 127 MG/DL (ref 70–110)
GLUCOSE BLD STRIP.AUTO-MCNC: 209 MG/DL (ref 70–110)

## 2022-08-25 PROCEDURE — 97535 SELF CARE MNGMENT TRAINING: CPT

## 2022-08-25 PROCEDURE — 74011636637 HC RX REV CODE- 636/637: Performed by: EMERGENCY MEDICINE

## 2022-08-25 PROCEDURE — 97110 THERAPEUTIC EXERCISES: CPT

## 2022-08-25 PROCEDURE — 51798 US URINE CAPACITY MEASURE: CPT

## 2022-08-25 PROCEDURE — 97530 THERAPEUTIC ACTIVITIES: CPT

## 2022-08-25 PROCEDURE — 74011250637 HC RX REV CODE- 250/637: Performed by: EMERGENCY MEDICINE

## 2022-08-25 PROCEDURE — 65310000000 HC RM PRIVATE REHAB

## 2022-08-25 PROCEDURE — 74011250637 HC RX REV CODE- 250/637: Performed by: HOSPITALIST

## 2022-08-25 PROCEDURE — 82962 GLUCOSE BLOOD TEST: CPT

## 2022-08-25 PROCEDURE — 97112 NEUROMUSCULAR REEDUCATION: CPT

## 2022-08-25 PROCEDURE — 97150 GROUP THERAPEUTIC PROCEDURES: CPT

## 2022-08-25 PROCEDURE — 99232 SBSQ HOSP IP/OBS MODERATE 35: CPT | Performed by: HOSPITALIST

## 2022-08-25 RX ADMIN — Medication 1 TABLET: at 09:11

## 2022-08-25 RX ADMIN — TIMOLOL MALEATE 1 DROP: 2.5 SOLUTION OPHTHALMIC at 17:21

## 2022-08-25 RX ADMIN — Medication 4 UNITS: at 09:12

## 2022-08-25 RX ADMIN — METFORMIN HYDROCHLORIDE 500 MG: 500 TABLET ORAL at 09:11

## 2022-08-25 RX ADMIN — TAMSULOSIN HYDROCHLORIDE 0.4 MG: 0.4 CAPSULE ORAL at 09:11

## 2022-08-25 RX ADMIN — ATORVASTATIN CALCIUM 40 MG: 40 TABLET, FILM COATED ORAL at 21:08

## 2022-08-25 RX ADMIN — TIMOLOL MALEATE 1 DROP: 2.5 SOLUTION OPHTHALMIC at 09:15

## 2022-08-25 RX ADMIN — Medication 1 TABLET: at 17:21

## 2022-08-25 RX ADMIN — DOCUSATE SODIUM 100 MG: 100 CAPSULE, LIQUID FILLED ORAL at 09:11

## 2022-08-25 NOTE — PROGRESS NOTES
Problem: Self Care Deficits Care Plan (Adult)  Goal: *Therapy Goal (Edit Goal, Insert Text)  Description: Occupational Therapy Goals   Long Term Goals  Initiated 2022 and to be accomplished within 2-3 week(s) 2022  1. Pt will perform self-feeding with Deb. 2. Pt will perform grooming with Deb. 3. Pt will perform UB bathing with Deb  4. Pt will perform LB bathing with Deb. 5. Pt will perform tub/shower transfer with Deb. 6. Pt will perform UB dressing with Deb. 7. Pt will perform LB dressing with Deb. 8. Pt will perform toileting task with Deb. 9. Pt will perform toilet transfer with Deb. 10.  Pt will perform an IADL task with good safety and Deb. Short Term Goals   Initiated 2022 and to be accomplished within 7 day(s) 2022  1. Pt will perform self-feeding with S   2. Pt will perform grooming with S.  3. Pt will perform UB bathing with S.  4. Pt will perform LB bathing with S  5. Pt will perform tub/shower transfer with S.  6. Pt will perform UB dressing with S.  7. Pt will perform LB dressing with S  8. Pt will perform toileting task with S.  9. Pt will perform toilet transfer with S.           Outcome: Progressing Towards Goal  Goal: Interventions  Outcome: Progressing Towards Goal     OCCUPATIONAL THERAPY GROUP THERAPY TREATMENT     Patient: Johanne Valdes (89 y.o. female)  Date: 2022  Diagnosis: Idiopathic transverse myelitis ([facility] [G37.3] <principal problem not specified>  Precautions:  Fall precautions; Safety precautions; Aspiration precautions     Time In: 1003  Time Out: 7184     Patient seen for: Occupational Therapy group (there ex) session     Pain:  Pt pain was reported as 0/10 pre-treatment. Pt pain was reported as 0/10 post-treatment. Intervention: No complaints of pain at onset, during, or at conclusion of group therapy session.       Patient identified with name and : yes     SUBJECTIVE:        Patient agreeable to participate during group therapy session. OBJECTIVE DATA SUMMARY:     Objective: Therex   Sets Reps Comments   Bicep curls      2     10  2 lb weighted bar   Chest Press      2     10  2 lb weighted bar   Forward roll; backward roll      2     10  2 lb weighted bar   Stirring motion holding bar vertically (clockwise; counter clockwise)     1     10  2 lb weighted bar   Shoulder shrugs     2      10    Shoulder rolls forward; backward     2      10      Therac   Sets   Reps   Comments   RUE/ LUE AROM performed during functional reaching patterns out-front, overhead, and laterally during warm-up and cool-down period. 2     10 Therapist providing verbal cues with demonstration for body positioning and technique. Edu/ instruction in pacing and use of controlled breathing/ pursed lip breathing for carryover to self-cares. Tasks modified based on patient's tolerance. ASSESSMENT:  Progression toward goals:  [x]          Patient participated fully in group therapy session and able to tolerate all activities  []          Patient able to participate in group therapy session with only minor modifications and/or extended rest breaks needed. []          Patient not able to tolerate group therapy session. PLAN:  Patient continues to benefit from skilled intervention to address functional impairments. Patient is appropriate to continue group therapy sessions to promote increased participation in skilled therapy interventions and to provide opportunities for increased social interaction.          After treatment:   [x]  Patient left in no apparent distress sitting up in wheelchair with needs met  []  Patient left in no apparent distress in bed  []  Call bell left within reach  [x]  Nursing notified  []  Caregiver present  [x]  Wheelchair alarm activated      [address] [facility],   8/25/2022

## 2022-08-25 NOTE — PROGRESS NOTES
Problem: Self Care Deficits Care Plan (Adult)  Goal: *Therapy Goal (Edit Goal, Insert Text)  Description: Occupational Therapy Goals   Long Term Goals  Initiated 2022 and to be accomplished within 2-3 week(s) 2022  1. Pt will perform self-feeding with Deb. 2. Pt will perform grooming with Deb. 3. Pt will perform UB bathing with Deb  4. Pt will perform LB bathing with Deb. 5. Pt will perform tub/shower transfer with Deb. 6. Pt will perform UB dressing with Deb. 7. Pt will perform LB dressing with Deb. 8. Pt will perform toileting task with Deb. 9. Pt will perform toilet transfer with Deb. 10.  Pt will perform an IADL task with good safety and Deb. Short Term Goals   Initiated 2022 and to be accomplished within 7 day(s) 2022  1. Pt will perform self-feeding with S   2. Pt will perform grooming with S.  3. Pt will perform UB bathing with S.  4. Pt will perform LB bathing with S  5. Pt will perform tub/shower transfer with S.  6. Pt will perform UB dressing with S.  7. Pt will perform LB dressing with S  8. Pt will perform toileting task with S.  9. Pt will perform toilet transfer with S.    Occupational Therapy TREATMENT    Patient: James Wallis   78 y.o. Patient identified with name and : Yes    Date: 2022    First Tx Session  Time In: 0930  Time Out[de-identified] 1000    Second Tx Session  Time In: 1030  Time Out[de-identified] 1100    Third Tx Session  Time In: 1330  Time Out[de-identified] 1410      Diagnosis: Idiopathic transverse myelitis ([facility] [G37.3]   Precautions:  Fall  Chart, occupational therapy assessment, plan of care, and goals were reviewed. Pain:  Pt reports 0/10 pain or discomfort prior to treatment. Pt reports 0/10 pain or discomfort post treatment. Intervention Provided: N/A      SUBJECTIVE:   Patient stated \"Fear of falling    OBJECTIVE DATA SUMMARY:     THERAPEUTIC ACTIVITY Daily Assessment    Mini Peg task to promote FM dexterity and strength for ADLs.  Pt complete task with 100% accuracy without dropping any pegs. Resistive clips tasks to promote anterior weight shift /trunk control/strength for functional transfers. Pt completed task showing increased control and strength without LOB. THERAPEUTIC EXERCISE Daily Assessment    Chair Raises 3x5 and   UB bike up to 10 minutes with moderate resistance to increase strength and endurance for ADLs. Pt propelled w/c from room<>gym using BLE to increase strength and endurance for ADLs. LOWER BODY DRESSING Daily Assessment     LB dressing seated in shower tub chair. Pt educated on leaning side to side method showing fair carryover. Pt required increased time to complete task. Sock and/or Shoe management: 5 Setup. Pt provided with reacher to get shoes from floor. MOBILITY/TRANSFERS Daily Assessment        Shower tub transfer x3. Pt instructed in anterior weight shift and sacral clearance showing 50% carryover.       EOB<>w/c using squat pivot with S.  Pt showed decreased clearance of sacral area/ anterior weight shift. ASSESSMENT:  Pt increasing strength, endurance for safe shower tub transfers and ADLs. Progression toward goals:  [x]          Improving appropriately and progressing toward goals  []          Improving slowly and progressing toward goals  []          Not making progress toward goals and plan of care will be adjusted     PLAN:  Patient continues to benefit from skilled intervention to address the above impairments. Continue treatment per established plan of care. Discharge Recommendations:  Home Health with asst  Further Equipment Recommendations for Discharge:  bedside commode, transfer bench     Activity Tolerance:  Fair       Estimated LOS:TBD    Please refer to the flowsheet for vital signs taken during this treatment.   After treatment:   []  Patient left in no apparent distress sitting up in chair   [x]  Patient left in no apparent distress in bed  []  Call bell left within reach  []  Nursing notified  []  Caregiver present  []  Bed alarm activated    COMMUNICATION/EDUCATION:   [] Home safety education was provided and the patient/caregiver indicated understanding. [x] Patient/family have participated as able in goal setting and plan of care. [] Patient/family agree to work toward stated goals and plan of care. [] Patient understands intent and goals of therapy, but is neutral about his/her participation. [] Patient is unable to participate in goal setting and plan of care.       Marissa Mejía OT        Outcome: Progressing Towards Goal  Goal: Interventions  Outcome: Progressing Towards Goal

## 2022-08-25 NOTE — ROUTINE PROCESS
Received pt in bed in no distress. Patient requested to be changed for incontinence episode. Diaper changed, bladder scan done,w/c is 498 ml PVR. Patient denies pain upon palpation of bladder area. Placed call to on call MD Dr. Domingo Minor, unable to contact per cell number and pager. Call placed to nursing supervisor for further assistance. 8:51 PM _ received call from Dr. Marlyn Carballo at 2020, notified of above. Received new orders for straight cath now. Bladder scan very 6 hours, if PVR > 250, may do straight cath and to notify Dr. Delfin Downey by noon time tomorrow. 8/25/22 if continues to hold urine for  possible f/c placement or Urology consult. Updated pt of plan of care, pt agrees.

## 2022-08-25 NOTE — PROGRESS NOTES
[facility]    Bedside and Verbal shift change report given to Keith Monahan (oncoming nurse) by Amanda Moura (offgoing nurse). Report included the following information SBAR, Kardex, MAR and Recent Results. Situation:   Code Status: Full Code   Hospital Day: 2   Problem List:   Hospital Problems  Date Reviewed: 11/18/2019            Codes Class Noted POA    Idiopathic transverse myelitis ([facility] ICD-10-CM: G37.3  ICD-9-CM: 341.22  8/23/2022 Unknown           Background:   Past Medical History:   Past Medical History:   Diagnosis Date    Allergic rhinitis     Cancer ([facility] 1995    Uterine Cancer (radiation, surgery)    Diverticulosis     GERD (gastroesophageal reflux disease)     Hypercholesteremia     Hypertension     Steatosis of liver 2015        Assessment:  Changes in Assessment throughout shift: No change to previous assessment    Patient has a central line: no Reasons if yes: Insertion date: Last dressing date:  Patient has Rdz Cath: no Reasons if yes:     Insertion date:  Shift rdz care completed:     Last Vitals:     Vitals:    08/24/22 1253 08/24/22 1521 08/24/22 2000 08/25/22 0914   BP: 115/72 123/80 135/83 110/76   Pulse: 64 71 80 98   Resp: 18 18 20 18   Temp: 97.4 °F (36.3 °C) 97 °F (36.1 °C) 97.6 °F (36.4 °C) 98.5 °F (36.9 °C)   SpO2: 98% 97% 96% 97%   Weight:       Height:           PAIN    Pain Assessment    Pain Intensity 1: 0 (08/25/22 0800) Pain Intensity 1: 2 (12/29/14 1105)      Pain Location 1: Abdomen      Pain Intervention(s) 1: Medication (see MAR)  Patient Stated Pain Goal: 0 Patient Stated Pain Goal: 0  Intervention effective: yes  Other actions taken for pain:      Skin Assessment  Skin color Skin Color: Appropriate for ethnicity  Condition/Temperature Skin Condition/Temp: Dry, Warm  Integrity Skin Integrity: Excoriation (groin)  Turgor    Weekly Pressure Ulcer Documentation  Pressure  Injury Documentation: No Pressure Injury Noted-Pressure Ulcer Prevention Initiated  Wound Prevention & Protection Methods  Orientation of wound Orientation of Wound Prevention: Posterior  Location of Prevention Location of Wound Prevention: Buttocks, Sacrum/Coccyx  Dressing Present Dressing Present : No  Dressing Status    Wound Offloading Wound Offloading (Prevention Methods): Adaptive equipment, Bed, pressure redistribution/air, Foam silicone, Pillows, Repositioning, Turning, Wheelchair    Wound Prevention Checklist  Precautions:      []  Heel prevention boots placed on patient    []  Patient turned q2h during shift    []  Hermilo Order Set ordered    [x]  Patient on Chata bed/Specialty bed    []  Each Wound is documented during shift (Stage, Color, drainage, odor, measurements, and dressings)    [x]  Dual skin checks done at bedside during shift report with MISHA Gonsalez    INTAKE/OUPUT  Date 08/24/22 0700 - 08/25/22 0659 08/25/22 0700 - 08/26/22 0659   Shift 2295-5939 9162-8804 24 Hour Total 4896-7953 1743-6223 24 Hour Total   INTAKE   P.O. 730 120 850        P. O. 730 120 850      Shift Total(mL/kg) 730(8.3) 120(1.4) 850(9.6)      OUTPUT   Urine(mL/kg/hr) 350(0.3) 475(0.4) 825(0.4)        Urine  475 475        Urine Voided 350  350        Urine Occurrence(s) 9 x 7 x 16 x      Stool           Stool Occurrence(s) 4 x 0 x 4 x      Shift Total(mL/kg) 350(4) 475(5.4) 825(9.3)       -355 25      Weight (kg) 88.5 88.5 88.5 88.5 88.5 88.5       Recommendations:  Patient needs and requests: bladder training    Pending tests/procedures: N/A     Functional Level/Equipment: Partial (one person) / Wheelchair    Fall Precautions:   Fall risk precautions were reinforced with the patient; she was instructed to call for help prior to getting up. The following fall risk precautions were continued: bed/ chair alarms, door signage, yellow bracelet and socks as well as update of the Starleen Freeze tool in the patient's room.    Jose J Score: 4    HEALS Safety Check    A safety check occurred in the patient's room between off going nurse and oncoming nurse listed above. The safety check included the below items  Area Items   H  High Alert Medications Verify all high alert medication drips (heparin, PCA, etc.)   E  Equipment Suction is set up for ALL patients (with ryan)  Red plugs utilized for all equipment (IV pumps, etc.)  WOWs wiped down at end of shift. Room stocked with oxygen, suction, and other unit-specific supplies   A  Alarms Bed alarm is set for fall risk patients  Ensure chair alarm is in place and activated if patient is up in a chair   L  Lines Check IV for any infiltration  Valle bag is empty if patient has a Valle   Tubing and IV bags are labeled   S  Safety   Room is clean, patient is clean, and equipment is clean. Hallways are clear from equipment besides carts. Fall bracelet on for fall risk patients  Ensure room is clear and free of clutter  Suction is set up for ALL patients (with ryan)  Hallways are clear from equipment besides carts.    Isolation precautions followed, supplies available outside room, sign posted     Daisy Echavarria

## 2022-08-25 NOTE — PROGRESS NOTES
I got a call from nursing stating that patient has urinary retention of more than 400 cc after she voided. She was started on flomax earlier today. Patient does not have any suprapubic pain per nursing. Plan: will do a straight catheterization if urine volume is more than 200 cc for next 12 hours and let flomax kick in.   If she continues to have issues tomorrow, she will benefit from urology consult or Valle catheter placement

## 2022-08-25 NOTE — PROGRESS NOTES
[address]  [address] [place] [address], [address]     INPATIENT REHABILITATION  DAILY PROGRESS NOTE     Date: 8/25/2022    Name: aMya Crowley Age / Sex: 78 y.o. / female   CSN: [id number] MRN: [id number]   Wells [place]in Date: 8/23/2022 Length of Stay: 2 days     Primary Rehabilitation Diagnosis: Impaired Mobility and ADLs secondary to Acute Transverse Myelitis       Subjective:     Patient seen and examined lying bed in no apparent distress. Patient's daughter is at bedside. Blood pressure controlled. Patient states that she is having multiple loose stools. Will hold colace for today. Pain Control: no current joint or muscle symptoms, essentially pain-free      Objective:     Vital Signs:  Patient Vitals for the past 24 hrs:   BP Temp Pulse Resp SpO2   08/25/22 0914 110/76 98.5 °F (36.9 °C) 98 18 97 %   08/24/22 2000 135/83 97.6 °F (36.4 °C) 80 20 96 %        Physical Examination:  GENERAL SURVEY: Patient is awake, alert, oriented x 3,  not in acute respiratory distress. HEENT: pink palpebral conjunctivae, anicteric sclerae, no nasoaural discharge, moist oral mucosa  NECK: supple, no jugular venous distention, no palpable lymph nodes  CHEST/LUNGS: symmetrical chest expansion, good air entry, clear breath sounds  HEART: adynamic precordium, good S1 S2, no S3, regular rhythm, no murmurs  ABDOMEN: flat, bowel sounds appreciated, soft, non-tender  EXTREMITIES: pink nailbeds, no edema, full and equal pulses, no calf tenderness   NEUROLOGICAL EXAM: The patient is awake, alert and oriented x3, able to answer questions fairly appropriately, able to follow 1 and 2 step commands. Able to tell time from the wall clock. Cranial nerves II-XII are grossly intact. No gross sensory deficit.       Current Medications:  Current Facility-Administered Medications   Medication Dose Route Frequency    tamsulosin (FLOMAX) capsule 0.4 mg  0.4 mg Oral DAILY    pneumococcal 23-valent (PNEUMOVAX 23) injection 0.5 mL  0.5 mL IntraMUSCular PRIOR TO DISCHARGE    atorvastatin (LIPITOR) tablet 40 mg  40 mg Oral QHS    calcium-vitamin D 600 mg-5 mcg (200 unit) per tablet 1 Tablet  1 Tablet Oral BID    cetirizine (ZYRTEC) tablet 5 mg  5 mg Oral DAILY PRN    losartan (COZAAR) tablet 50 mg  50 mg Oral DAILY    metFORMIN (GLUCOPHAGE) tablet 500 mg  500 mg Oral DAILY WITH BREAKFAST    hydroCHLOROthiazide (HYDRODIURIL) tablet 12.5 mg  12.5 mg Oral DAILY    [Held by provider] carvediloL (COREG) tablet 6.25 mg  6.25 mg Oral Q12H    timolol (TIMOPTIC) 0.25% ophthalmic solution  1 Drop Both Eyes BID    fluticasone propionate (FLONASE) 50 mcg/actuation nasal spray 2 Spray  2 Spray Both Nostrils DAILY    insulin lispro (HUMALOG) injection   SubCUTAneous AC&HS    glucose chewable tablet 16 g  4 Tablet Oral PRN    glucagon (GLUCAGEN) injection 1 mg  1 mg IntraMUSCular PRN    dextrose 10% infusion 0-250 mL  0-250 mL IntraVENous PRN    [Held by provider] docusate sodium (COLACE) capsule 100 mg  100 mg Oral BID    bisacodyL (DULCOLAX) tablet 10 mg  10 mg Oral Q48H PRN       Allergies:   Allergies   Allergen Reactions    Penicillin G Hives     Functional Progress:    SPEECH AND LANGUAGE PATHOLOGY    ON ADMISSION MOST RECENT   Comprehension (Native Language)  Primary Mode of Comprehension: Auditory  Score: 6 Comprehension (Native Language)  Primary Mode of Comprehension: Auditory  Score: 6     Expression (Native Language)  Primary Mode of Expression: Verbal  Score: 6   Expression (Native Language)  Primary Mode of Expression: Verbal  Score: 6     Social Interaction/Pragmatics  Score: 4 Social Interaction/Pragmatics  Score: 5     Problem Solving  Score: 4   Problem Solving  Score: 4     Memory  Score: 5 Memory  Score: 5       Legend:   7 - Independent   6 - Modified Independent   5 - Standby Assistance / Supervision / Set-up   4 - Minimum Assistance / Contact Guard Assistance   3 - Moderate Assistance   2 - Maximum Assistance   1 - Total Assistance / Dependent       Lab/Data Review:  Recent Results (from the past 24 hour(s))   GLUCOSE, POC    Collection Time: 08/24/22  4:30 PM   Result Value Ref Range    Glucose (POC) 123 (H) 70 - 110 mg/dL   GLUCOSE, POC    Collection Time: 08/24/22  9:40 PM   Result Value Ref Range    Glucose (POC) 108 70 - 110 mg/dL   GLUCOSE, POC    Collection Time: 08/25/22  7:32 AM   Result Value Ref Range    Glucose (POC) 209 (H) 70 - 110 mg/dL   GLUCOSE, POC    Collection Time: 08/25/22 12:20 PM   Result Value Ref Range    Glucose (POC) 126 (H) 70 - 110 mg/dL   GLUCOSE, POC    Collection Time: 08/25/22  4:19 PM   Result Value Ref Range    Glucose (POC) 127 (H) 70 - 110 mg/dL       Assessment:     Primary Rehabilitation Diagnosis  1. Impaired Mobility and ADLs  2. Acute Transverse Myelitis    Comorbidities  Patient Active Problem List   Diagnosis Code    History of uterine cancer Z85.42    Hydronephrosis N13.30    Idiopathic transverse myelitis ([facility] [address] G37.3       Plan:     1. Justification for continued stay: Good progression towards established rehabilitation goals. 2. Medical Issues being followed closely:    [x]  Fall and safety precautions     []  Wound Care     [x]  Bowel and Bladder Function     [x]  Fluid Electrolyte and Nutrition Balance     []  Pain Control      3. Issues that 24 hour rehabilitation nursing is following:    [x]  Fall and safety precautions     []  Wound Care     [x]  Bowel and Bladder Function     [x]  Fluid Electrolyte and Nutrition Balance     []  Pain Control      [x]  Assistance with and education on in-room safety with transfers to and from the bed, wheelchair, toilet and shower. 4. Acute rehabilitation plan of care:    [x]  Continue current care and rehab. [x]  Physical Therapy           [x]  Occupational Therapy           []  Speech Therapy     []  Hold Rehab until further notice     5. Medications:    [x]  MAR Reviewed     [x]  Continue Present Medications     6.  Chemical DVT Prophylaxis:      []  Enoxaparin     []  Unfractionated Heparin     []  Warfarin     []  NOAC     []  Aspirin     [x]  None     7. Mechanical DVT Prophylaxis:      []  JACE Stockings     []  Sequential Compression Device     [x]  None     8. GI Prophylaxis:      []  PPI     []  H2 Blocker     [x]  None / Not indicated     9. Code status:    [x]  Full code     []  Partial code     []  Do not intubate     []  Do not resuscitate     10. Diet:  Specifications Adult Diet: Regular 4 carb choices (60 gm/meal)    Solids (consistency) Regular    Liquids (consistency) Regular    Fluid Restriction None      11. Orders:     Acute Transverse Myelitis  Idiopathic transverse myelitis-patient treated with high-dose IV steroids, MRI brain showed demyelination, suggestive of transverse myelitis, patient underwent an MRI of the thoracic as well as the lumbar spine, results attached, she also underwent a lumbar puncture which was noted to be negative. Urinary Retention secondary to acute transverse myelitis   > Bladder Scans/Checks Q hours, If PVR > 250 Straight cath   > Flomax 0.4 mg PO    > Continue to monitor    Diabetes Mellitus Type 2    > Glucophage 500 mg daily with breakfast     Hypertension   > Coreg 6.25 mg - held due to hypotensive episode   > Losartan 50 mg    > HCTZ 12.5 mg     12. Personal Protective Equipment (N95 face mask and eye goggles) was used while interacting with the patient. Patient was using a surgical mask. 15. Patient's progress in rehabilitation and medical issues discussed with the patient. All questions answered to the best of my ability. Care plan discussed with patient and nurse. 14. Total clinical care time is 30 minutes, including review of chart including all labs, radiology, past medical history, and discussion with patient and family. Greater than 50% of my time was spent in coordination of care and counseling.       Signed:    Roberto Howell NP    August 25, 2022

## 2022-08-25 NOTE — PROGRESS NOTES
Problem: Mobility Impaired (Adult and Pediatric)  Goal: *Acute Goals and Plan of Care (Insert Text)  Description: Physical Therapy Short Term Goals  Initiated 2022 and to be accomplished within 7 day(s) (2022)  1. Patient will move from supine to sit and sit to supine  in bed with modified independence. 2.  Patient will transfer from bed to chair and chair to bed with supervision/set-up using the least restrictive device. 3.  Patient will perform sit to stand with maximal assistance. 4.  Patient will propel w/c over level surfaces greater than or equal to 150 feet with modified independence. Physical Therapy Long Term Goals  Initiated 2022 and to be accomplished within 21 day(s) (2022)  1. Patient will move from supine to sit and sit to supine , scoot up and down, and roll side to side in bed with independence. 2.  Patient will transfer from bed to chair and chair to bed with modified independence using the least restrictive device. 3.  Patient will perform sit to stand with minimal assistance/contact guard assist.  4.  Patient will ambulate with moderate assistance  for 25 feet with the least restrictive device. 5.  Patient will ascend/descend 4 stairs with 1 handrail(s) with moderate assistance. 6.  Patient will propel w/c over level surfaces, surfaces of varying grades, thresholds and ramps with modified independence. Outcome: Progressing Towards Goal   PHYSICAL THERAPY TREATMENT    Patient: Anabella Rollins (17 y.o. female)  Date: 2022  Diagnosis: [facility] [G37.3]   Precautions:  falls risk  Chart, physical therapy assessment, plan of care and goals were reviewed. Time VY:[id number]  Time [id number]:[id number]    Patient seen for: Balance activities; Patient education; Therapeutic exercise;Transfer training; Wheelchair mobility    Pain:  Patient denied any pain.      Patient identified with name and : yes    SUBJECTIVE:      Patient agreeable to treatment. Reporting having fear of falling with standing activity due to two controlled falls that occurred at [facility]. OBJECTIVE DATA SUMMARY:    Objective:     GROSS ASSESSMENT Daily Assessment     AROM: Generally decreased, functional  PROM: Within functional limits  Strength: Grossly decreased, non-functional  Coordination: Generally decreased, functional (left LE with decreased coordination compared to right)  Tone: Normal  Sensation: Impaired      BED/MAT MOBILITY Daily Assessment     Rolling Right : 6 (Modified independent)  Rolling Left : 6 (Modified independent)  Supine to Sit : 5 (Supervision)      TRANSFERS Daily Assessment     Transfer Type: Other  Other: squat pivot  Transfer Assistance : 4 (Minimal assistance) (min A for safety and for increased clearance of bottom during lateral transfer)  Sit to Stand Assistance: Maximum assistance (min/mod lifting and lowering assist)    Cues for progressing from pulling up into standing with parallel bars to pushing up with at least one UE up into standing and reaching back to sitting. Lateral scooting along mat to practice anterior weightshift of trunk over base of support to improve clearance of bottom during scooting for ease of transfers and to encourage weightbearing through bilat LE.          GAIT Daily Assessment    Gait Description (WDL)      Gait Abnormalities      Assistive device      Ambulation assistance - level surface      Distance      Ambulation assistance- uneven surface      Comments Patient not appropriate for gait training yet        STEPS/STAIRS Daily Assessment     Steps/Stairs ambulated      Assistance Required  (Unable at this time)    Rail Use      Comments  Patient not yet able to participate safely yet    Curbs/Ramps          BALANCE Daily Assessment     Sitting - Static: Good (unsupported)  Sitting - Dynamic: Fair (occasional)        WHEELCHAIR MOBILITY Daily Assessment     Able to Propel (ft): 186 feet  Functional Level: 4  Curbs/Ramps Assist Required (FIM Score): 0 (Not tested)  Wheelchair Setup Assist Required : 3 (Moderate assistance)  Wheelchair Management: Manages left brake;Manages right brake (assistance with armrests)    -primarily using bilat LE to propel but using bilat UE to assist at times. Performing x 2 bouts        THERAPEUTIC EXERCISES Daily Assessment       Seated LAQ with 1.5# cuff weights 3 x 10 reps to improve ability to come into standing. Supine therex in preparation for bed mobility and transfers:  -ankle pumps x 15 reps  -heel slides x 10 reps  -hip abd SLRS x 10 reps  -bridges 2 x 6 reps       Neuro Re-Education:  Static standing in parallel bars needing moderate assistance for stabilization of trunk, intermittent blocking of bilat LE due to weakness and quick fatigue of quads. Performing static stands x 5 reps up to 1 minute durations for increased weight acceptance bilat LE and reduced reliance on bilat UE to stand. Cues to decrease significant UE support on parallel bars. Also cued for 3 of the bouts for flexing and extending bilat knees x 3 reps to increase engagement of quads/hamstrings/hip extensors during standing. ASSESSMENT:  Patient continues to have decreased functional endurance but able to   Progression toward goals:  []      Improving appropriately and progressing toward goals  [x]      Improving slowly and progressing toward goals  []      Not making progress toward goals and plan of care will be adjusted      PLAN:  Patient continues to benefit from skilled intervention to address the above impairments. Continue treatment per established plan of care. Discharge Recommendations:  Home Health and caregiver support  Further Equipment Recommendations for Discharge:  TBD pending progress      Estimated Discharge Date: 9/14/2022    Activity Tolerance:   Fair to good depending on fatigue. Please refer to the flowsheet for vital signs taken during this treatment.     After treatment:   [] Patient left in no apparent distress in bed  [x] Patient left in no apparent distress sitting up in chair  [] Patient left in no apparent distress in w/c mobilizing under own power  [] Patient left in no apparent distress dining area  [] Patient left in no apparent distress mobilizing under own power  [x] Call bell left within reach  [x] Nursing notified  [] Caregiver present  [] Bed alarm activated   [x] Chair alarm activated      Barby Lovell, PT  8/25/2022

## 2022-08-25 NOTE — PROGRESS NOTES
1115: pt urinated on bedside commode. Bladder scanned after 189ml greatest amount recorded. 1710: bladder scanned post void, showed >362ml. MD aware, straight cath performed. 1725: 200ml in straight cath, documented in flowsheet.

## 2022-08-25 NOTE — PROGRESS NOTES
Problem: Falls - Risk of  Goal: *Absence of Falls  Description: Document Johnny Barros Fall Risk and appropriate interventions in the flowsheet.   Outcome: Progressing Towards Goal  Note: Fall Risk Interventions:  Mobility Interventions: Assess mobility with egress test, Bed/chair exit alarm, Communicate number of staff needed for ambulation/transfer         Medication Interventions: Bed/chair exit alarm, Patient to call before getting OOB, Teach patient to arise slowly, Utilize gait belt for transfers/ambulation    Elimination Interventions: Bed/chair exit alarm, Call light in reach, Elevated toilet seat, Patient to call for help with toileting needs, Toilet paper/wipes in reach, Toileting schedule/hourly rounds    History of Falls Interventions: Bed/chair exit alarm, Door open when patient unattended, Utilize gait belt for transfer/ambulation, Assess for delayed presentation/identification of injury for 48 hrs (comment for end date), Vital signs minimum Q4HRs X 24 hrs (comment for end date)         Problem: Pain  Goal: *Control of Pain  Outcome: Progressing Towards Goal     Problem: Inpatient Rehab (Adult)  Goal: *LTG: Avoids infection 100% of time related to deficits  Outcome: Progressing Towards Goal  Goal: *LTG: Absence of DVT during hospitalization  Outcome: Progressing Towards Goal  Goal: *LTG: Maintains Skin Integrity With No Evidence of Pressure Injury 100% of Time  Outcome: Progressing Towards Goal

## 2022-08-25 NOTE — ROUTINE PROCESS
SHIFT CHANGE NOTE FOR [facility]    Bedside and Verbal shift change report given to Loni Lincoln (oncoming nurse) by Gosia Gonzalez RN (offgoing nurse). Report included the following information SBAR, Kardex, MAR and Recent Results.     Situation:   Code Status: Full Code   Hospital Day: 2   Problem List:   Hospital Problems  Date Reviewed: 11/18/2019            Codes Class Noted POA    [facility] ICD-10-CM: G37.3  ICD-9-CM: 341.22  8/23/2022 Unknown           Background:   Past Medical History:   Past Medical History:   Diagnosis Date    Allergic rhinitis     Cancer ([facility] [address] 1995    Uterine Cancer (radiation, surgery)    Diverticulosis     GERD (gastroesophageal reflux disease)     Hypercholesteremia     Hypertension     Steatosis of liver 2015        Assessment:  Changes in Assessment throughout shift: No change to previous assessment    Patient has a central line: no Reasons if yes: n/a  Insertion date:n/a Last dressing date:n/a  Patient has Rdz Cath: no Reasons if yes: n/a   Insertion date:n/a  Shift rdz care completed: NO    Last Vitals:     Vitals:    08/24/22 1228 08/24/22 1253 08/24/22 1521 08/24/22 2000   BP: 123/85 115/72 123/80 135/83   Pulse: 63 64 71 80   Resp: 18 18 18 20   Temp: 97.4 °F (36.3 °C) 97.4 °F (36.3 °C) 97 °F (36.1 °C) 97.6 °F (36.4 °C)   SpO2: 98% 98% 97% 96%   Weight:       Height:           PAIN    Pain Assessment    Pain Intensity 1: 0 (08/25/22 0403) Pain Intensity 1: 2 (12/29/14 1105)      Pain Location 1: Abdomen      Pain Intervention(s) 1: Medication (see MAR)  Patient Stated Pain Goal: Unable to verbalize/indicate pain (pt sleeping) Patient Stated Pain Goal: 0  Intervention effective: no complaints of pain overnight  Other actions taken for pain:      Skin Assessment  Skin color Skin Color: Appropriate for ethnicity  Condition/Temperature Skin Condition/Temp: Dry, Warm  Integrity Skin Integrity: Excoriation (groin)  Turgor    Weekly Pressure Ulcer Documentation Pressure  Injury Documentation: No Pressure Injury Noted-Pressure Ulcer Prevention Initiated  Wound Prevention & Protection Methods  Orientation of wound Orientation of Wound Prevention: Posterior  Location of Prevention Location of Wound Prevention: Buttocks, Sacrum/Coccyx  Dressing Present Dressing Present : No  Dressing Status    Wound Offloading Wound Offloading (Prevention Methods): Adaptive equipment, Wheelchair    Wound Prevention Checklist  Precautions:      []  Heel prevention boots placed on patient    []  Patient turned q2h during shift    []  [place] Order Set ordered    [x]  Patient on [place] bed/Specialty bed    []  Each Wound is documented during shift (Stage, Color, drainage, odor, measurements, and dressings)    [x]  Dual skin checks done at bedside during shift report with Karla    INTAKE/OUPUT  Date 08/24/22 0700 - 08/25/22 0659 08/25/22 0700 - 08/26/22 0659   Shift 1542-9510 0984-8228 24 Hour Total 3475-9381 9749-9533 24 Hour Total   INTAKE   P.O. 730  730        P. O. 730  730      Shift Total(mL/kg) 730(8.3)  730(8.3)      OUTPUT   Urine(mL/kg/hr) 350(0.3) 475 825        Urine  475 475        Urine Voided 350  350        Urine Occurrence(s) 9 x 5 x 14 x      Stool           Stool Occurrence(s) 4 x 0 x 4 x      Shift Total(mL/kg) 350(4) 475(5.4) 825(9.3)       -475 -95      Weight (kg) 88.5 88.5 88.5 88.5 88.5 88.5       Recommendations:  Patient needs and requests: check PVR every 6 hours, bladder training. Pending tests/procedures: none at this time     Functional Level/Equipment: Partial (one person) / Wheelchair    Fall Precautions:   Fall risk precautions were reinforced with the patient; she was instructed to call for help prior to getting up. The following fall risk precautions were continued: bed/ chair alarms, door signage, yellow bracelet and socks as well as update of the Clydene Bone tool in the patient's room.    Jose J Score: 4    HEALS Safety Check    A safety check occurred in the patient's room between off going nurse and oncoming nurse listed above. The safety check included the below items  Area Items   H  High Alert Medications Verify all high alert medication drips (heparin, PCA, etc.)   E  Equipment Suction is set up for ALL patients (with ryan)  Red plugs utilized for all equipment (IV pumps, etc.)  WOWs wiped down at end of shift. Room stocked with oxygen, suction, and other unit-specific supplies   A  Alarms Bed alarm is set for fall risk patients  Ensure chair alarm is in place and activated if patient is up in a chair   L  Lines Check IV for any infiltration  Valle bag is empty if patient has a Valle   Tubing and IV bags are labeled   S  Safety   Room is clean, patient is clean, and equipment is clean. Hallways are clear from equipment besides carts. Fall bracelet on for fall risk patients  Ensure room is clear and free of clutter  Suction is set up for ALL patients (with ryan)  Hallways are clear from equipment besides carts.    Isolation precautions followed, supplies available outside room, sign posted     Gosia Gonzalez RN

## 2022-08-26 LAB
GLUCOSE BLD STRIP.AUTO-MCNC: 106 MG/DL (ref 70–110)
GLUCOSE BLD STRIP.AUTO-MCNC: 114 MG/DL (ref 70–110)
GLUCOSE BLD STRIP.AUTO-MCNC: 120 MG/DL (ref 70–110)
GLUCOSE BLD STRIP.AUTO-MCNC: 129 MG/DL (ref 70–110)

## 2022-08-26 PROCEDURE — 97112 NEUROMUSCULAR REEDUCATION: CPT

## 2022-08-26 PROCEDURE — 99232 SBSQ HOSP IP/OBS MODERATE 35: CPT | Performed by: HOSPITALIST

## 2022-08-26 PROCEDURE — 74011250637 HC RX REV CODE- 250/637: Performed by: HOSPITALIST

## 2022-08-26 PROCEDURE — 74011250637 HC RX REV CODE- 250/637: Performed by: EMERGENCY MEDICINE

## 2022-08-26 PROCEDURE — 97530 THERAPEUTIC ACTIVITIES: CPT

## 2022-08-26 PROCEDURE — 97535 SELF CARE MNGMENT TRAINING: CPT

## 2022-08-26 PROCEDURE — 65310000000 HC RM PRIVATE REHAB

## 2022-08-26 PROCEDURE — 82962 GLUCOSE BLOOD TEST: CPT

## 2022-08-26 PROCEDURE — 51798 US URINE CAPACITY MEASURE: CPT

## 2022-08-26 PROCEDURE — 97110 THERAPEUTIC EXERCISES: CPT

## 2022-08-26 RX ADMIN — TIMOLOL MALEATE 1 DROP: 2.5 SOLUTION OPHTHALMIC at 09:10

## 2022-08-26 RX ADMIN — TIMOLOL MALEATE 1 DROP: 2.5 SOLUTION OPHTHALMIC at 17:16

## 2022-08-26 RX ADMIN — METFORMIN HYDROCHLORIDE 500 MG: 500 TABLET ORAL at 09:10

## 2022-08-26 RX ADMIN — Medication 1 TABLET: at 17:16

## 2022-08-26 RX ADMIN — HYDROCHLOROTHIAZIDE 12.5 MG: 25 TABLET ORAL at 09:10

## 2022-08-26 RX ADMIN — LOSARTAN POTASSIUM 50 MG: 50 TABLET, FILM COATED ORAL at 09:10

## 2022-08-26 RX ADMIN — TAMSULOSIN HYDROCHLORIDE 0.4 MG: 0.4 CAPSULE ORAL at 09:10

## 2022-08-26 RX ADMIN — ATORVASTATIN CALCIUM 40 MG: 40 TABLET, FILM COATED ORAL at 21:00

## 2022-08-26 RX ADMIN — Medication 1 TABLET: at 09:10

## 2022-08-26 NOTE — DIABETES MGMT
Diabetes/ Glycemic Control Plan of Care    Recommendations:  1.) correctional lispro insulin as ordered. 2.) add low dose basal lantus insulin 5 units daily when two BG values are 200 and above within 24 hour. 3.) disch diabetes med: Metformin 500 mg BID with meals    Assessment: Patient with no prior history of diabetes was transferred to the acute rehab  unit on 8/23/2022 from [facility].    New diagnosis T2DDM this admission. Patient willing to learn. Plan: revisit patient for diabetes education to include BG monitoring education and training. Addendum at 16:31: Completed diabetes education which included BG meter training. POC BG 8/25/2022: 209 (07:32), 126 (ac lunch), 127 (ac dinner), 119 (bedtime)    POC BG 8/26/2022: 129 (ac breakfast)    DX:   1. Idiopathic transverse myelitis ([facility] [address] [G37.3 (ICD-10-CM)]           Fasting/ Morning blood glucose:   Lab Results   Component Value Date/Time    Glucose 146 (H) 08/24/2022 06:08 AM    Glucose (POC) 129 (H) 08/26/2022 07:18 AM     IV Fluids containing dextrose: none    Steroids:  None    Blood glucose values: Within target range (70-180mg/dL):  NO. Patient had 209 BG yesterday before breakfast. Her fasting BG this morning was within target range: 129    Current insulin orders:   Correctional lispro insulin. Normal sensitivity dose    Current oral diabetes medication:  Metformin 500 mg daily with breakfast    Total Daily Dose previous 24 hours: 4 units correctional lispro insulin    Current A1c:   Lab Results   Component Value Date/Time    Hemoglobin A1c 6.7 (H) 08/24/2022 06:08 AM      equivalent  to ave Blood Glucose of 146 mg/dl for 2-3 months prior to admission    Adequate glycemic control PTA: YES. Nutrition/Diet:   Active Orders   Diet    ADULT DIET Regular; 4 carb choices (60 gm/meal);  Low Fat/Low Chol/High Fiber/2 gm Na      Meal Intake:  Patient Vitals for the past 168 hrs:   % Diet Eaten   08/26/22 0981 51 - 75% 08/25/22 1300 76 - 100%   08/25/22 1035 76 - 100%   08/24/22 1800 76 - 100%   08/24/22 1400 26 - 50%   08/24/22 0852 51 - 75%     Supplement Intake:  No data found. Home diabetes medications: None. Patient reported no prior history of diabetes mellitus. Key Antihyperglycemic Medications       Patient is on no antihyperglycemic meds. Plan/Goals:   Blood glucose will be within target of 70 - 180 mg/dl within 72 hours  Reinforce dietary and medication compliance at home.           Education:  [x] Refer to Diabetes Education Record: 8/26/2022                       [] Education not indicated at this time     Bradley Farnsworth RN CCM

## 2022-08-26 NOTE — PROGRESS NOTES
Pt is a 78year old female admitted to ARU for acute transverse myelitis. Pt is alert and oriented, alone in the room. Pt states that she lives alone in a 1 level home with 4 steps to enter and a tub shower. Pt states that prior to admission she was able to self care and denies history with DME, home health and SNF. Pt states that she has been to outpatient therapy at [facility]. Pt states that she has no POA and that her daughter Colton Ko ([phone]) - is her NOK contact. Pt states that she may stay with her daughter - Douglas Levi ([phone]) - in [facility] in a 2 level home with 2 steps to enter and tub shower. Pt states that she is unsure of her PCP and that she fills her medications at the hospital prior to dc and then [facility] for continued fills. Pt states that she has received 3 doses of the [address] vaccine. Pt confirms her insurance as Medicare and  for Life. Pt states that  is from her spouse's 20 years of service in the [employer] with a 0% disability rating. Sw reviewed dc planning, team conference and caregiver education. Pt states understanding and denies questions. Sw will follow.

## 2022-08-26 NOTE — PROGRESS NOTES
Problem: Falls - Risk of  Goal: *Absence of Falls  Description: Document [place] Flow Fall Risk and appropriate interventions in the flowsheet.   Outcome: Progressing Towards Goal  Note: Fall Risk Interventions:  Mobility Interventions: Assess mobility with egress test         Medication Interventions: Bed/chair exit alarm    Elimination Interventions: Call light in reach    History of Falls Interventions: Bed/chair exit alarm         Problem: Patient Education: Go to Patient Education Activity  Goal: Patient/Family Education  Outcome: Progressing Towards Goal     Problem: Pain  Goal: *Control of Pain  Outcome: Progressing Towards Goal

## 2022-08-26 NOTE — ROUTINE PROCESS
SHIFT CHANGE NOTE FOR [facility]    Bedside and Verbal shift change report given to Dianna Langley RN (oncoming nurse) by Ashley Tirado RN (offgoing nurse). Report included the following information SBAR, Kardex, MAR and Recent Results.     Situation:   Code Status: Full Code   Hospital Day: 3   Problem List:   Hospital Problems  Date Reviewed: 11/18/2019            Codes Class Noted POA    [facility] ICD-10-CM: G37.3  ICD-9-CM: 341.22  8/23/2022 Unknown           Background:   Past Medical History:   Past Medical History:   Diagnosis Date    Allergic rhinitis     Cancer ([facility] [address] 1995    Uterine Cancer (radiation, surgery)    Diverticulosis     GERD (gastroesophageal reflux disease)     Hypercholesteremia     Hypertension     Steatosis of liver 2015        Assessment:  Changes in Assessment throughout shift: No change to previous assessment    Patient has a central line: no Reasons if yes: n/a  Insertion date: n/a Last dressing date: n/a  Patient has Rdz Cath: no Reasons if yes:  n/a   Insertion date: n/a  Shift rdz care completed: NO    Last Vitals:     Vitals:    08/24/22 2000 08/25/22 0914 08/25/22 1631 08/25/22 1954   BP: 135/83 110/76 109/68 128/65   Pulse: 80 98 92 90   Resp: 20 18 16 18   Temp: 97.6 °F (36.4 °C) 98.5 °F (36.9 °C) 97.8 °F (36.6 °C) 98.7 °F (37.1 °C)   SpO2: 96% 97% 97% 99%   Weight:       Height:           PAIN    Pain Assessment    Pain Intensity 1: 0 (08/25/22 2342) Pain Intensity 1: 2 (12/29/14 1105)      Pain Location 1: Abdomen      Pain Intervention(s) 1: Medication (see MAR)  Patient Stated Pain Goal: 0 Patient Stated Pain Goal: 0  Intervention effective: no  Other actions taken for pain:      Skin Assessment  Skin color Skin Color: Appropriate for ethnicity  Condition/Temperature Skin Condition/Temp: Dry, Warm  Integrity Skin Integrity: Excoriation (groin)  Turgor    Weekly Pressure Ulcer Documentation  Pressure  Injury Documentation: No Pressure Injury Noted-Pressure Ulcer Prevention Initiated  Wound Prevention & Protection Methods  Orientation of wound Orientation of Wound Prevention: Posterior  Location of Prevention Location of Wound Prevention: Sacrum/Coccyx  Dressing Present Dressing Present : No  Dressing Status    Wound Offloading Wound Offloading (Prevention Methods): Adaptive equipment, Bed, pressure redistribution/air, Foam silicone, Pillows, Repositioning, Turning, Wheelchair    Wound Prevention Checklist  Precautions:      []  Heel prevention boots placed on patient    []  Patient turned q2h during shift    []  H[place] Order Set ordered    []  Patient on [place] bed/Specialty bed    []  Each Wound is documented during shift (Stage, Color, drainage, odor, measurements, and dressings)    [x]  Dual skin checks done at bedside during shift report with Fouzia Watson RN    INTAKE/OUPUT  Date 08/25/22 0700 - 08/26/22 0659 08/26/22 0700 - 08/27/22 0659   Shift 9632-2601 8883-2692 24 Hour Total 1106-7750 0557-4139 24 Hour Total   INTAKE   P.O. 240 150 390        P.O. 240 150 390      Shift Total(mL/kg) 240(2.7) 150(1.7) 390(4.4)      OUTPUT   Urine(mL/kg/hr) 200(0.2) 1100 1300        Urine 200  200        Urine Voided  1100 1100        Urine Occurrence(s) 2 x 3 x 5 x      Emesis/NG output           Emesis Occurrence(s)  0 x 0 x      Stool           Stool Occurrence(s) 3 x 0 x 3 x      Shift Total(mL/kg) 200(2.3) 1100(12.4) 1300(14.7)      NET 40 -950 -910      Weight (kg) 88.5 88.5 88.5 88.5 88.5 88.5       Recommendations:  Patient needs and requests: Toileting    Pending tests/procedures: routine labs     Functional Level/Equipment: Partial (one person) /      Fall Precautions:   Fall risk precautions were reinforced with the patient; she was instructed to call for help prior to getting up. The following fall risk precautions were continued: bed/ chair alarms, door signage, yellow bracelet and socks as well as update of the Katharine Joeg tool in the patient's room.    Katharine Josefina Score: 4    HEALS Safety Check    A safety check occurred in the patient's room between off going nurse and oncoming nurse listed above. The safety check included the below items  Area Items   H  High Alert Medications Verify all high alert medication drips (heparin, PCA, etc.)   E  Equipment Suction is set up for ALL patients (with ryan)  Red plugs utilized for all equipment (IV pumps, etc.)  WOWs wiped down at end of shift. Room stocked with oxygen, suction, and other unit-specific supplies   A  Alarms Bed alarm is set for fall risk patients  Ensure chair alarm is in place and activated if patient is up in a chair   L  Lines Check IV for any infiltration  Valle bag is empty if patient has a Valle   Tubing and IV bags are labeled   S  Safety   Room is clean, patient is clean, and equipment is clean. Hallways are clear from equipment besides carts. Fall bracelet on for fall risk patients  Ensure room is clear and free of clutter  Suction is set up for ALL patients (with ryan)  Hallways are clear from equipment besides carts.    Isolation precautions followed, supplies available outside room, sign posted     Adrienne Rollins RN

## 2022-08-26 NOTE — PROGRESS NOTES
Problem: Self Care Deficits Care Plan (Adult)  Goal: *Therapy Goal (Edit Goal, Insert Text)  Description: Occupational Therapy Goals   Long Term Goals  Initiated 2022 and to be accomplished within 2-3 week(s) 2022  1. Pt will perform self-feeding with Deb. 2. Pt will perform grooming with Deb. 3. Pt will perform UB bathing with Deb  4. Pt will perform LB bathing with Deb. 5. Pt will perform tub/shower transfer with Deb. 6. Pt will perform UB dressing with Deb. 7. Pt will perform LB dressing with Deb. 8. Pt will perform toileting task with Deb. 9. Pt will perform toilet transfer with Deb. 10.  Pt will perform an IADL task with good safety and Deb. Short Term Goals   Initiated 2022 and to be accomplished within 7 day(s) 2022  1. Pt will perform self-feeding with S   2. Pt will perform grooming with S.  3. Pt will perform UB bathing with S.  4. Pt will perform LB bathing with S  5. Pt will perform tub/shower transfer with S.  6. Pt will perform UB dressing with S.  7. Pt will perform LB dressing with S  8. Pt will perform toileting task with S.  9. Pt will perform toilet transfer with S.      Occupational Therapy TREATMENT    Patient: Jami Do   78 y.o. Patient identified with name and : Yes    Date: 2022    First Tx Session  Time In: 0930  Time Out[de-identified] 1100    Diagnosis: Idiopathic transverse myelitis ([facility] [address] [G37.3]   Precautions: Fall  Chart, occupational therapy assessment, plan of care, and goals were reviewed. Pain:  Pt reports 0/10 pain or discomfort prior to treatment. Pt reports 0/10 pain or discomfort post treatment. Intervention Provided: N/A      SUBJECTIVE:   Patient stated \" I have has this. .. Pt reports bowel and bladder incontinence episodes when recent medical issue arose. OBJECTIVE DATA SUMMARY:     GROOMING Daily Assessment    Functional Level: 5  Tasks Completed by Patient: Washed face; Washed hands       Oral hygiene: 5 BATHING Daily Assessment    Functional Level: 5 (setup in shower)  Body Parts Patient Bathed: Abdomen;Arm, left;Arm, right;Buttocks; Chest;Lower leg and foot, left; Lower leg and foot, right;Thigh, left;Precious area; Thigh, right        TOILETING Daily Assessment    Functional Level: 5 (setup)   PT INCONTINENT OF BOWEL BLADDER this session. Pt educated on starting a bowel and bladder routine verbalizing understanding. Nursing made aware,     UPPER BODY DRESSING Daily Assessment    Functional Level: 5 (setup)  Items Applied/Steps Completed: Bra (3 steps); Pullover (4 steps)        LOWER BODY DRESSING Daily Assessment    Functional Level: 5 (setup)   Pt educated on donning pants EOB using side to side method for increased I for all situations. Increased time to complete task. Sock and/or Shoe management: 5       MOBILITY/TRANSFERS Daily Assessment    Toilet Transfer Score: 5 (bedside commode used)     Tub/Shower Transfer Score: 5 (vcs for ant weight shift and clearing sacrum)    W/c to EOB with S.  Pt required education on getting to EOB first before plopping back into bed. Pt education on getting into log roll position once EOB to  preserve integrity of spine. ASSESSMENT:  Pt showing Setup/SBA with bathing/ dressing at w/c level, shower/toilet transfers and toileting. Pt incontinent of bowel this session in the shower. Progression toward goals:  [x]          Improving appropriately and progressing toward goals  []          Improving slowly and progressing toward goals  []          Not making progress toward goals and plan of care will be adjusted     PLAN:  Patient continues to benefit from skilled intervention to address the above impairments. Continue treatment per established plan of care.   Discharge Recommendations:  Home Health  Further Equipment Recommendations for Discharge:  bedside commode, transfer bench, and N/A     Activity Tolerance:  Fair      Estimated LOS:    Please refer to the flowsheet for vital signs taken during this treatment. After treatment:   []  Patient left in no apparent distress sitting up in chair   [x]  Patient left in no apparent distress in bed  []  Call bell left within reach  []  Nursing notified  []  Caregiver present  []  Bed alarm activated    COMMUNICATION/EDUCATION:   [] Home safety education was provided and the patient/caregiver indicated understanding. [x] Patient/family have participated as able in goal setting and plan of care. [] Patient/family agree to work toward stated goals and plan of care. [] Patient understands intent and goals of therapy, but is neutral about his/her participation. [] Patient is unable to participate in goal setting and plan of care.       Cong Flynn OT      Outcome: Progressing Towards Goal  Goal: Interventions  Outcome: Progressing Towards Goal

## 2022-08-26 NOTE — PROGRESS NOTES
Problem: Mobility Impaired (Adult and Pediatric)  Goal: *Acute Goals and Plan of Care (Insert Text)  Description: Physical Therapy Short Term Goals  Initiated 2022 and to be accomplished within 7 day(s) (2022)  1. Patient will move from supine to sit and sit to supine  in bed with modified independence. 2.  Patient will transfer from bed to chair and chair to bed with supervision/set-up using the least restrictive device. 3.  Patient will perform sit to stand with maximal assistance. 4.  Patient will propel w/c over level surfaces greater than or equal to 150 feet with modified independence. Physical Therapy Long Term Goals  Initiated 2022 and to be accomplished within 21 day(s) (2022)  1. Patient will move from supine to sit and sit to supine , scoot up and down, and roll side to side in bed with independence. 2.  Patient will transfer from bed to chair and chair to bed with modified independence using the least restrictive device. 3.  Patient will perform sit to stand with minimal assistance/contact guard assist.  4.  Patient will ambulate with moderate assistance  for 25 feet with the least restrictive device. 5.  Patient will ascend/descend 4 stairs with 1 handrail(s) with moderate assistance. 6.  Patient will propel w/c over level surfaces, surfaces of varying grades, thresholds and ramps with modified independence. Outcome: Progressing Towards Goal   PHYSICAL THERAPY TREATMENT    Patient: Carmen Sifuentes (19 y.o. female)  Date: 2022  Diagnosis: [facility] [G37.3]   Precautions: Fall  Chart, physical therapy assessment, plan of care and goals were reviewed. Time In: 0745  Time Out: 0900  Time In:1105  Time Out:1135    Patient seen for: Balance activities; Patient education; Therapeutic exercise; Wheelchair mobility;Transfer training    Pain:  Patient denied pain during session.     Patient identified with name and : yes    SUBJECTIVE: Patient agreeable to treatment. Reporting that she slept well last night. Asking for edis crackers prior to therapy and nurse giving permission. OBJECTIVE DATA SUMMARY:    Objective:     BED/MAT MOBILITY Daily Assessment     Rolling Right : 6 (Modified independent)  Rolling Left : 6 (Modified independent)  Supine to Sit : 6 (Modified independent)  Sit to Supine : 5 (Supervision)    Performing mat mobility as well as mobility on hospital bed. TRANSFERS Daily Assessment     Transfer Type: Other  Other: squat pivot  Transfer Assistance : 4 (Minimal assistance)  Sit to Stand Assistance: Maximum assistance (moderate to maximal assistance for safe lifting and lowering into standing with knees intermittently blocked for safety. Using parallel bars and patient reporting ongoing fear of falling with standing attempts.)    Reminders provided for safe positioning prior to transfer, including positioning right foot forward when performing lateral squat pivot transfer to her right side. Able to return demonstration with proper foot placement at later transfer.     Sit to stand progressing from pushing up with one hand from mat to pushing up with bilat UE from mat, though patient observed to rely on quickly transitioning to parallel bars for support with coming into standing and quickly locking bilat knees into extension        GAIT Daily Assessment    Gait Description (WDL)      Gait Abnormalities      Assistive device      Ambulation assistance - level surface      Distance      Ambulation assistance- uneven surface      Comments Not yet safe to attempt        STEPS/STAIRS Daily Assessment     Steps/Stairs ambulated      Assistance Required  (Unable at this time)    Rail Use      Comments  Not yet safe to attempt    Curbs/Ramps          BALANCE Daily Assessment     Sitting - Static: Good (unsupported)  Sitting - Dynamic: Good (unsupported)        WHEELCHAIR MOBILITY Daily Assessment     Able to Propel (ft): 186 feet  Functional Level:  (intermittent assistance for obstacles and narrow spaces, backing up. using bilat LE primarily to propel)  Curbs/Ramps Assist Required (FIM Score): 0 (Not tested)  Wheelchair Setup Assist Required : 3 (Moderate assistance)  Wheelchair Management: Manages left brake;Manages right brake (starting to participate in management of armrests (swing away))        THERAPEUTIC EXERCISES Daily Assessment       Therex to improve ability to perform transfers and progress to standing activity. Seated  EXERCISE   Sets   Reps   Active Active Assist   Comments   Ankle Pumps   [] []      Long Arc Quads 3 10 [x]  []   no weight, 3 sec holds   Seated Marching 3 10 [x]  []  No weight    Heel slides     []  []      Hip Abd/ER clamshells w/ T-band     []  []      Hip Abd SLRs   []  []      Hip Add Isometrics   []  []     Heel taps on floor     []  []      Wheelchair pushups   6  [x]  []  PT cueing quads for improved ms engagement during activity       Supine therex:  -Bridges 3x 10 reps emphasis on improved hip extension strength, abdominal strength    Sidelying therex:  -hip abd/ER clamshells with min/mod A for keeping feet together 3 x 10 reps        Neuro Re-Education:  Static standing in parallel bars needing moderate assistance for stabilization of trunk, intermittent blocking of bilat LE due to weakness and quick fatigue of quads. Performing static stands x 4 reps up to 1 minute 15 second durations for increased weight acceptance bilat LE and reduced reliance on bilat UE to stand. Also cued for 2 of the bouts for hip flex marches of each leg x 5 reps (PT blocking contralateral knee for safety). ASSESSMENT:  Patient continues to fatigue easily with all LE exercises and functional activity, needing frequent therapeutic rest breaks.    Progression toward goals:  [x]      Improving appropriately and progressing toward goals  []      Improving slowly and progressing toward goals  []      Not making progress toward goals and plan of care will be adjusted      PLAN:  Patient continues to benefit from skilled intervention to address the above impairments. Continue treatment per established plan of care. Discharge Recommendations:  Emily Davis with caregiver support  Further Equipment Recommendations for Discharge:  lightweight wheelchair 20\" with swing away armrests (allows for increased independence and reduced fatigue)      Estimated Discharge Date: 9/14/2022    Activity Tolerance:   Fair due to fatigue.     After both treatments:   [] Patient left in no apparent distress in bed  [x] Patient left in no apparent distress sitting up in chair  [] Patient left in no apparent distress in w/c mobilizing under own power  [] Patient left in no apparent distress dining area  [] Patient left in no apparent distress mobilizing under own power  [x] Call bell left within reach  [x] Nursing notified  [] Caregiver present  [] Bed alarm activated   [x] Chair alarm activated      Barby Lovell, PT  8/26/2022

## 2022-08-26 NOTE — PROGRESS NOTES
[facility] PHYSICAL REHABILITATION  [address] [place] [address], [address]     [address] OF CARE    Name: Shobha Schaffer CSN: [id number]   Age: 78 y.o. MRN: [id number]   Sex: female Admit Date: 8/23/2022     [unfilled]      ANTICIPATED INTERVENTIONS THAT SUPPORT THE MEDICAL NECESSITY OF THIS ADMISSION:    I. Physical Therapy              A. Intensity: 1-2 hour per day              B. Frequency: 5 times per week              C. Duration: 2 weeks              D. Long Term Goals:    1. Patient will move from supine to sit and sit to supine  in bed with modified independence. 2.  Patient will transfer from bed to chair and chair to bed with supervision/set-up using the least restrictive device. 3.  Patient will perform sit to stand with maximal assistance. 4.  Patient will propel w/c over level surfaces greater than or equal to 150 feet with modified independence. E. Interventions:   Patient continues to benefit from skilled intervention to address the above impairments. Continue treatment per established plan of care. Discharge Recommendations:  Emily 80 with caregiver support  Further Equipment Recommendations for Discharge:  lightweight wheelchair 20\" with swing away armrests (allows for increased independence and reduced fatigue)    II. Occupational Therapy              A. Intensity: 1-2 hour per day              B. Frequency: 5 times per week              C. Duration: 2 weeks              D. Long Term Goals:    Long Term Goals  Initiated 8/24/2022 and to be accomplished within 2-3 week(s) 9/14/2022  1. Pt will perform self-feeding with Deb. 2. Pt will perform grooming with Deb. 3. Pt will perform UB bathing with Deb  4. Pt will perform LB bathing with Deb. 5. Pt will perform tub/shower transfer with Deb. 6. Pt will perform UB dressing with Deb. 7. Pt will perform LB dressing with Deb. 8. Pt will perform toileting task with Deb.   9. Pt will perform toilet transfer with Deb. 10.  Pt will perform an IADL task with good safety and Deb.     E. Interventions:   Patient continues to benefit from skilled intervention to address the above impairments. Continue treatment per established plan of care. Discharge Recommendations:  Home Health  Further Equipment Recommendations for Discharge:  bedside commode, transfer bench, and N/A        PHYSICIAN'S ASSESSMENT OF FINDINGS:    Are the established goals sufficient for achieving the optimal level of function? [x]  Yes      []  No    What changes would you recommend to the goals as written? None      Are the interventions noted sufficient for achieving the optimal level of function? [x]  Yes      []  No    What changes would you recommend to the interventions noted? If therapy staff is unable to provide 3 hr of total therapy per day in 5 days due to medical issues or decreased patient tolerance, may modify treatment schedule to 15 hr/week.       Estimated length of stay: 2 weeks      Medical rehabilitation prognosis:    []  Excellent     [x]  Good     []  Fair     []  Guarded       Discharge Destination:     [x]  Home     []  [address]     []  Bill Copeland     []  Aimee Harvey     []  Alicia     []  Other:       Signed:    Torrance Soulier, MD    August 26, 2022

## 2022-08-26 NOTE — DISCHARGE INSTRUCTIONS
Patient armband removed and given to patient to take home. Patient was informed of the privacy risks if armband lost or stolen    MyChart Activation    Thank you for requesting access to [facility] [employer]. Please follow the instructions below to securely access and download your online medical record. [facility] allows you to send messages to your doctor, view your test results, renew your prescriptions, schedule appointments, and more. How Do I Sign Up? In your internet browser, go to [email or web address]  Click on the First Time User? Click Here link in the Sign In box. You will be redirect to the New Member Sign Up page. Enter your [facility] [employer] Access Code exactly as it appears below. You will not need to use this code after youve completed the sign-up process. If you do not sign up before the expiration date, you must request a new code. [facility] Access Code: [email or web address]  Expires: 10/18/2022 10:51 AM (This is the date your [facility] access code will )    Enter the last four digits of your Social Security Number (xxxx) and Date of Birth (mm/dd/yyyy) as indicated and click Submit. You will be taken to the next sign-up page. Create a [facility] ID. This will be your [facility] login ID and cannot be changed, so think of one that is secure and easy to remember. Create a [facility] [employer] password. You can change your password at any time. Enter your Password Reset Question and Answer. This can be used at a later time if you forget your password. Enter your e-mail address. You will receive e-mail notification when new information is available in [address] Click Sign Up. You can now view and download portions of your medical record. Click the [employer]am link to download a portable copy of your medical information. Additional Information    If you have questions, please visit the Frequently Asked Questions section of the [employer] website at [email or web address] Remember, [facility] [employer] is NOT to be used for urgent needs. For medical emergencies, dial 911. DISCHARGE SUMMARY from Nurse    PATIENT INSTRUCTIONS:    After general anesthesia or intravenous sedation, for 24 hours or while taking prescription Narcotics:  Limit your activities  Do not drive and operate hazardous machinery  Do not make important personal or business decisions  Do  not drink alcoholic beverages  If you have not urinated within 8 hours after discharge, please contact your surgeon on call. Report the following to your surgeon:  Excessive pain, swelling, redness or odor of or around the surgical area  Temperature over 100.5  Nausea and vomiting lasting longer than 4 hours or if unable to take medications  Any signs of decreased circulation or nerve impairment to extremity: change in color, persistent  numbness, tingling, coldness or increase pain  Any questions    What to do at Home:  Recommended activity: Activity as tolerated    If you experience any of the following symptoms Nausea,vomiting, diarrhea, chest pain, shortness of breath  , please follow up with PCP. *  Please give a list of your current medications to your Primary Care Provider. *  Please update this list whenever your medications are discontinued, doses are      changed, or new medications (including over-the-counter products) are added. *  Please carry medication information at all times in case of emergency situations. These are general instructions for a healthy lifestyle:    No smoking/ No tobacco products/ Avoid exposure to second hand smoke  Surgeon General's Warning:  Quitting smoking now greatly reduces serious risk to your health.     Obesity, smoking, and sedentary lifestyle greatly increases your risk for illness    A healthy diet, regular physical exercise & weight monitoring are important for maintaining a healthy lifestyle    You may be retaining fluid if you have a history of heart failure or if you experience any of the following symptoms:  Weight gain of 3 pounds or more overnight or 5 pounds in a week, increased swelling in our hands or feet or shortness of breath while lying flat in bed. Please call your doctor as soon as you notice any of these symptoms; do not wait until your next office visit. The discharge information has been reviewed with the patient and caregiver. The patient and caregiver verbalized understanding. Discharge medications reviewed with the patient and caregiver and appropriate educational materials and side effects teaching were provided. ___________________________________________________________________________________________________________________________________  ------------------------------------------------------------------------------------------------------------    DISCHARGE INSTRUCTIONS    1. Make sure that when you request refills at the pharmacy that the refill requests are sent to your PCP (NOT to the prescriber at the [facility] for Physical Rehabilitation) to avoid any delays in getting your medication refills. The physician at the [facility] for [address] will not able to order medications or refills after discharge -- Please understand that though we would like to help, it is simply not safe for our physician to order you a medication that we cannot monitor. 2. If any of the prescribed medications require a PRIOR AUTHORIZATION, contact your Primary Care Physician or specialist to EITHER complete prior authorizations and paperwork on your behalf OR prescribe an alternative medication. -- Please understand that though we would like to help, it is simply not safe for our physician to order you a medication that we cannot monitor.      3. If any of your prescriptions medications PRIOR TO ADMISSION TO [place] [address] needs a refill (and either the dosage, frequency or instructions was not changed), kindly contact your Primary Care Physician for refills.     -------------------------------------------------------------------------------------------------------------------

## 2022-08-26 NOTE — DIABETES MGMT
Diabetes Patient/Family Education Record    Factors That May Influence Patients Ability to Learn or Comply with Recommendations   []   Language barrier    []   Cultural needs   []   Motivation    []   Cognitive limitation    []   Physical   [x]   Education    []   Physiological factors   []   Hearing/vision/speaking impairment   []   Hindu beliefs    []   Financial factors   []  Other:   []  No factors identified at this time. Person Instructed:   [x]   Patient   []   Family   []  Other     Preference for Learning:   [x]   Verbal   [x]   Written: diab educ packet; contents explained/discussed. [x]  Demonstration     Level of Comprehension & Competence:    [x]  Good                                      [] Fair                                     []  Poor                             []  Needs Reinforcement   [x]  Teach back completed    Education Component:   [x]  Medication management, including how to administer insulin (if appropriate) and potential medication interactions: Patient with no prior history of diabetes. New diagnosis this admission. Educated patient on Metformin. [x]  Nutritional management - [x] Obtained usual meal pattern   [x]   Basic carbohydrate counting  [x]  Plate method  [x]  Limit concentrated sweets and avoid sweetened beverages  [x]  Portion control  [x]    Avoid skipping meals     [x]  Exercise: Discussed fall/injury prevention. Encouraged patient to follow training and education by PT/OT. [x]  Signs, symptoms, and treatment of hyperglycemia and hypoglycemia: Discussed high blood sugar in detail with patient. [x] Prevention, recognition and treatment of hyperglycemia and hypoglycemia: Discussed low blood sugar less than 70 in detail with patient.      [x]  Importance of blood glucose monitoring  [x] Fasting Blood Glucose range    [x]   Provided patient with blood glucose meter sample, Contour next EZ, with 10 test strips and 10 lancetes. Patient will need prescriptions for additional testing supplies if she decide to cont to use this meter.  []  Has glucometer and supplies at home     [x]  Instruction on use of the blood glucose meter and recommended monitoring schedule     [x]  Discuss the importance of HbA1C monitoring. Patient's A1c is 6.7 % (8/24/2022). This is equivalent to average glucose of 146 mg/dl for the past 2-3 months.     []  Sick day guidelines   [x]  Proper use and disposal of lancets, needles, syringes or insulin pens (if appropriate)   []  Potential long-term complications (retinopathy, kidney disease, neuropathy, foot care)   [x] Information about whom to contact in case of emergency or for more information    [x]  Goal:  Patient/family will demonstrate understanding of Diabetes Self- Management Skills by: 8/31/2022  Plan for post-discharge education or self-management support:    [x] Outpatient class schedule provided            [] Patient Declined    [] Scheduled for outpatient classes (date) _______    [x] Written information provided  Verify: [x] Prior to admission Diabetes medications    Does patient understand how diabetes medications work? No. Educated patient about metformin  Does patient have difficulty obtaining diabetes medications and testing supplies? N/A. No prior history of diabetes but patient stated that she has health insurance coverage.

## 2022-08-26 NOTE — PROGRESS NOTES
SHIFT CHANGE NOTE FOR [facility]    Bedside and Verbal shift change report given to Will Ferreira (oncoming nurse) by Hugh Seaman (offgoing nurse). Report included the following information SBAR, Kardex, MAR and Recent Results. Situation:   Code Status: Full Code   Hospital Day: 3   Problem List:   Hospital Problems  Date Reviewed: 11/18/2019            Codes Class Noted POA    Idiopathic transverse myelitis ([facility] [address] ICD-10-CM: G37.3  ICD-9-CM: 341.22  8/23/2022 Unknown           Background:   Past Medical History:   Past Medical History:   Diagnosis Date    Allergic rhinitis     Cancer ([facility] [address] 1995    Uterine Cancer (radiation, surgery)    Diverticulosis     GERD (gastroesophageal reflux disease)     Hypercholesteremia     Hypertension     Steatosis of liver 2015        Assessment:  Changes in Assessment throughout shift: No change to previous assessment    Patient has a central line: no Reasons if yes: Insertion date: Last dressing date:  Patient has Rdz Cath: no Reasons if yes:     Insertion date:  Shift rdz care completed:     Last Vitals:     Vitals:    08/25/22 0914 08/25/22 1631 08/25/22 1954 08/26/22 0727   BP: 110/76 109/68 128/65 135/77   Pulse: 98 92 90 82   Resp: 18 16 18 18   Temp: 98.5 °F (36.9 °C) 97.8 °F (36.6 °C) 98.7 °F (37.1 °C) 98 °F (36.7 °C)   SpO2: 97% 97% 99% 96%   Weight:       Height:           PAIN    Pain Assessment    Pain Intensity 1: 0 (08/26/22 0735) Pain Intensity 1: 2 (12/29/14 1105)      Pain Location 1: Abdomen      Pain Intervention(s) 1: Medication (see MAR)  Patient Stated Pain Goal: 0 Patient Stated Pain Goal: 0  Intervention effective: yes  Other actions taken for pain:      Skin Assessment  Skin color Skin Color: Appropriate for ethnicity  Condition/Temperature Skin Condition/Temp: Dry, Warm  Integrity Skin Integrity: Excoriation (groin)  Turgor    Weekly Pressure Ulcer Documentation  Pressure  Injury Documentation: No Pressure Injury Noted-Pressure Ulcer Prevention Initiated  Wound Prevention & Protection Methods  Orientation of wound Orientation of Wound Prevention: Posterior  Location of Prevention Location of Wound Prevention: Sacrum/Coccyx  Dressing Present Dressing Present : No  Dressing Status    Wound Offloading Wound Offloading (Prevention Methods): Adaptive equipment, Bed, pressure redistribution/air, Foam silicone, Pillows, Repositioning, Walker, Turning, Wheelchair    Wound Prevention Checklist  Precautions: Fall    []  Heel prevention boots placed on patient    []  Patient turned q2h during shift    []  Hermilo Order Set ordered    [x]  Patient on Mckenna bed/Specialty bed    []  Each Wound is documented during shift (Stage, Color, drainage, odor, measurements, and dressings)    [x]  Dual skin checks done at bedside during shift report with MISHA Gonsalez    INTAKE/OUPUT  Date 08/25/22 0700 - 08/26/22 0659 08/26/22 0700 - 08/27/22 0659   Shift 7821-1238 4496-4116 24 Hour Total 9966-0055 7627-1109 24 Hour Total   INTAKE   P.O. 240 150 390 600  600     P. O. 240 150 390 600  600   Shift Total(mL/kg) 240(2.7) 150(1.7) 390(4.4) 600(6.8)  600(6.8)   OUTPUT   Urine(mL/kg/hr) 200(0.2) 1100(1) 1300(0.6) 300  300     Urine 200  200 300  300     Urine Voided  1100 1100        Urine Occurrence(s) 2 x 3 x 5 x 1 x  1 x   Emesis/NG output           Emesis Occurrence(s)  0 x 0 x      Stool           Stool Occurrence(s) 3 x 0 x 3 x 1 x  1 x   Shift Total(mL/kg) 200(2.3) 1100(12.4) 1300(14.7) 300(3.4)  300(3.4)   NET 40 -950 -910 300  300   Weight (kg) 88.5 88.5 88.5 88.5 88.5 88.5       Recommendations:  Patient needs and requests: ADLs/toileting     Pending tests/procedures: N/A     Functional Level/Equipment: Partial (one person) /  wheelchair/ bedside commode    Fall Precautions:   Fall risk precautions were reinforced with the patient; she was instructed to call for help prior to getting up.  The following fall risk precautions were continued: bed/ chair alarms, door signage, yellow bracelet and socks as well as update of the Ofe Larsonuro tool in the patient's room. Jose J Score: 4    HEALS Safety Check    A safety check occurred in the patient's room between off going nurse and oncoming nurse listed above. The safety check included the below items  Area Items   H  High Alert Medications Verify all high alert medication drips (heparin, PCA, etc.)   E  Equipment Suction is set up for ALL patients (with ryan)  Red plugs utilized for all equipment (IV pumps, etc.)  WOWs wiped down at end of shift. Room stocked with oxygen, suction, and other unit-specific supplies   A  Alarms Bed alarm is set for fall risk patients  Ensure chair alarm is in place and activated if patient is up in a chair   L  Lines Check IV for any infiltration  Valle bag is empty if patient has a Valle   Tubing and IV bags are labeled   S  Safety   Room is clean, patient is clean, and equipment is clean. Hallways are clear from equipment besides carts. Fall bracelet on for fall risk patients  Ensure room is clear and free of clutter  Suction is set up for ALL patients (with ryan)  Hallways are clear from equipment besides carts.    Isolation precautions followed, supplies available outside room, sign posted     Ruth Lee

## 2022-08-26 NOTE — PROGRESS NOTES
1300: urine output 100ml  PVR: 263ml - straight cathed - 300ml out    1700: urine output 50ml  PVR: >400 - rdz inserted per Dr. Mariah Yang.      250ml emptied out of rdz

## 2022-08-26 NOTE — PROGRESS NOTES
[address]  [address] [place] [address], [address]     INPATIENT REHABILITATION  DAILY PROGRESS NOTE     Date: 8/26/2022    Name: Opal Median Age / Sex: 78 y.o. / female   CSN: [id number] MRN: [id number]   [facility] Date: 8/23/2022 Length of Stay: 3 days     Primary Rehabilitation Diagnosis: Impaired Mobility and ADLs secondary to Acute Transverse Myelitis       Subjective:     Patient seen and examined lying bed in no apparent distress. Reviewed nursing notes, shows patient has had straight cath x2 since yesterday. If patient continues to retain urine, may benefit from a Valle catheter. Flomax has been started. Pain Control: no current joint or muscle symptoms, essentially pain-free      Objective:     Vital Signs:  Patient Vitals for the past 24 hrs:   BP Temp Pulse Resp SpO2   08/26/22 1520 126/87 97.5 °F (36.4 °C) 92 18 95 %   08/26/22 0727 135/77 98 °F (36.7 °C) 82 18 96 %   08/25/22 1954 128/65 98.7 °F (37.1 °C) 90 18 99 %   08/25/22 1631 109/68 97.8 °F (36.6 °C) 92 16 97 %          Physical Examination:  GENERAL SURVEY: Patient is awake, alert, oriented x 3,  not in acute respiratory distress. HEENT: pink palpebral conjunctivae, anicteric sclerae, no nasoaural discharge, moist oral mucosa  NECK: supple, no jugular venous distention, no palpable lymph nodes  CHEST/LUNGS: symmetrical chest expansion, good air entry, clear breath sounds  HEART: adynamic precordium, good S1 S2, no S3, regular rhythm, no murmurs  ABDOMEN: flat, bowel sounds appreciated, soft, non-tender  EXTREMITIES: pink nailbeds, no edema, full and equal pulses, no calf tenderness   NEUROLOGICAL EXAM: The patient is awake, alert and oriented x3, able to answer questions fairly appropriately, able to follow 1 and 2 step commands. Able to tell time from the wall clock. Cranial nerves II-XII are grossly intact. No gross sensory deficit.       Current Medications:  Current Facility-Administered Medications   Medication Dose Route Frequency    tamsulosin (FLOMAX) capsule 0.4 mg  0.4 mg Oral DAILY    pneumococcal 23-valent (PNEUMOVAX 23) injection 0.5 mL  0.5 mL IntraMUSCular PRIOR TO DISCHARGE    atorvastatin (LIPITOR) tablet 40 mg  40 mg Oral QHS    calcium-vitamin D 600 mg-5 mcg (200 unit) per tablet 1 Tablet  1 Tablet Oral BID    cetirizine (ZYRTEC) tablet 5 mg  5 mg Oral DAILY PRN    losartan (COZAAR) tablet 50 mg  50 mg Oral DAILY    metFORMIN (GLUCOPHAGE) tablet 500 mg  500 mg Oral DAILY WITH BREAKFAST    hydroCHLOROthiazide (HYDRODIURIL) tablet 12.5 mg  12.5 mg Oral DAILY    [Held by provider] carvediloL (COREG) tablet 6.25 mg  6.25 mg Oral Q12H    timolol (TIMOPTIC) 0.25% ophthalmic solution  1 Drop Both Eyes BID    fluticasone propionate (FLONASE) 50 mcg/actuation nasal spray 2 Spray  2 Spray Both Nostrils DAILY    insulin lispro (HUMALOG) injection   SubCUTAneous AC&HS    glucose chewable tablet 16 g  4 Tablet Oral PRN    glucagon (GLUCAGEN) injection 1 mg  1 mg IntraMUSCular PRN    dextrose 10% infusion 0-250 mL  0-250 mL IntraVENous PRN    [Held by provider] docusate sodium (COLACE) capsule 100 mg  100 mg Oral BID    bisacodyL (DULCOLAX) tablet 10 mg  10 mg Oral Q48H PRN       Allergies:   Allergies   Allergen Reactions    Penicillin G Hives     Functional Progress:    SPEECH AND LANGUAGE PATHOLOGY    ON ADMISSION MOST RECENT   Comprehension (Native Language)  Primary Mode of Comprehension: Auditory  Score: 6 Comprehension (Native Language)  Primary Mode of Comprehension: Auditory  Score: 6     Expression (Native Language)  Primary Mode of Expression: Verbal  Score: 6   Expression (Native Language)  Primary Mode of Expression: Verbal  Score: 6     Social Interaction/Pragmatics  Score: 4 Social Interaction/Pragmatics  Score: 5     Problem Solving  Score: 4   Problem Solving  Score: 4     Memory  Score: 5 Memory  Score: 5       Legend:   7 - Independent   6 - Modified Independent   5 - Standby Assistance / Supervision / Set-up   4 - Minimum Assistance / Contact Guard Assistance   3 - Moderate Assistance   2 - Maximum Assistance   1 - Total Assistance / Dependent       Lab/Data Review:  Recent Results (from the past 24 hour(s))   GLUCOSE, POC    Collection Time: 08/25/22  4:19 PM   Result Value Ref Range    Glucose (POC) 127 (H) 70 - 110 mg/dL   GLUCOSE, POC    Collection Time: 08/25/22  9:07 PM   Result Value Ref Range    Glucose (POC) 119 (H) 70 - 110 mg/dL   GLUCOSE, POC    Collection Time: 08/26/22  7:18 AM   Result Value Ref Range    Glucose (POC) 129 (H) 70 - 110 mg/dL   GLUCOSE, POC    Collection Time: 08/26/22 12:06 PM   Result Value Ref Range    Glucose (POC) 120 (H) 70 - 110 mg/dL   GLUCOSE, POC    Collection Time: 08/26/22  3:54 PM   Result Value Ref Range    Glucose (POC) 106 70 - 110 mg/dL       Assessment:     Primary Rehabilitation Diagnosis  1. Impaired Mobility and ADLs  2. Acute Transverse Myelitis    Comorbidities  Patient Active Problem List   Diagnosis Code    History of uterine cancer Z85.42    Hydronephrosis N13.30    Idiopathic transverse myelitis ([facility] [address] G37.3       Plan:     1. Justification for continued stay: Good progression towards established rehabilitation goals. 2. Medical Issues being followed closely:    [x]  Fall and safety precautions     []  Wound Care     [x]  Bowel and Bladder Function     [x]  Fluid Electrolyte and Nutrition Balance     []  Pain Control      3. Issues that 24 hour rehabilitation nursing is following:    [x]  Fall and safety precautions     []  Wound Care     [x]  Bowel and Bladder Function     [x]  Fluid Electrolyte and Nutrition Balance     []  Pain Control      [x]  Assistance with and education on in-room safety with transfers to and from the bed, wheelchair, toilet and shower. 4. Acute rehabilitation plan of care:    [x]  Continue current care and rehab.            [x]  Physical Therapy           [x]  Occupational Therapy           [] Speech Therapy     []  Hold Rehab until further notice     5. Medications:    [x]  MAR Reviewed     [x]  Continue Present Medications     6. Chemical DVT Prophylaxis:      []  Enoxaparin     []  Unfractionated Heparin     []  Warfarin     []  NOAC     []  Aspirin     [x]  None     7. Mechanical DVT Prophylaxis:      []  JACE Stockings     []  Sequential Compression Device     [x]  None     8. GI Prophylaxis:      []  PPI     []  H2 Blocker     [x]  None / Not indicated     9. Code status:    [x]  Full code     []  Partial code     []  Do not intubate     []  Do not resuscitate     10. Diet:  Specifications Adult Diet: Regular 4 carb choices (60 gm/meal)    Solids (consistency) Regular    Liquids (consistency) Regular    Fluid Restriction None      11. Orders:     Acute Transverse Myelitis  Idiopathic transverse myelitis-patient treated with high-dose IV steroids, MRI brain showed demyelination, suggestive of transverse myelitis, patient underwent an MRI of the thoracic as well as the lumbar spine, results attached, she also underwent a lumbar puncture which was noted to be negative. Urinary Retention secondary to acute transverse myelitis   > Bladder Scans/Checks Q hours, If PVR > 250 Straight cath   > Flomax 0.4 mg PO    > Continue to monitor    Diabetes Mellitus Type 2    > Glucophage 500 mg daily with breakfast     Hypertension   > Coreg 6.25 mg - held due to hypotensive episode   > Losartan 50 mg    > HCTZ 12.5 mg       Total clinical care time is 30 minutes, including review of chart including all labs, radiology, past medical history, and discussion with patient and family. Greater than 50% of my time was spent in coordination of care and counseling.       Signed:    Kristopher Steinberg MD    August 26, 2022

## 2022-08-27 LAB
GLUCOSE BLD STRIP.AUTO-MCNC: 106 MG/DL (ref 70–110)
GLUCOSE BLD STRIP.AUTO-MCNC: 132 MG/DL (ref 70–110)
GLUCOSE BLD STRIP.AUTO-MCNC: 135 MG/DL (ref 70–110)
GLUCOSE BLD STRIP.AUTO-MCNC: 213 MG/DL (ref 70–110)

## 2022-08-27 PROCEDURE — 82962 GLUCOSE BLOOD TEST: CPT

## 2022-08-27 PROCEDURE — 97530 THERAPEUTIC ACTIVITIES: CPT

## 2022-08-27 PROCEDURE — 65310000000 HC RM PRIVATE REHAB

## 2022-08-27 PROCEDURE — 74011636637 HC RX REV CODE- 636/637: Performed by: EMERGENCY MEDICINE

## 2022-08-27 PROCEDURE — 97535 SELF CARE MNGMENT TRAINING: CPT

## 2022-08-27 PROCEDURE — 74011250637 HC RX REV CODE- 250/637: Performed by: HOSPITALIST

## 2022-08-27 PROCEDURE — 97112 NEUROMUSCULAR REEDUCATION: CPT

## 2022-08-27 PROCEDURE — 74011250637 HC RX REV CODE- 250/637: Performed by: EMERGENCY MEDICINE

## 2022-08-27 PROCEDURE — 97110 THERAPEUTIC EXERCISES: CPT

## 2022-08-27 PROCEDURE — 2709999900 HC NON-CHARGEABLE SUPPLY

## 2022-08-27 RX ADMIN — ATORVASTATIN CALCIUM 40 MG: 40 TABLET, FILM COATED ORAL at 21:46

## 2022-08-27 RX ADMIN — METFORMIN HYDROCHLORIDE 500 MG: 500 TABLET ORAL at 09:19

## 2022-08-27 RX ADMIN — TAMSULOSIN HYDROCHLORIDE 0.4 MG: 0.4 CAPSULE ORAL at 09:20

## 2022-08-27 RX ADMIN — Medication 1 TABLET: at 09:19

## 2022-08-27 RX ADMIN — TIMOLOL MALEATE 1 DROP: 2.5 SOLUTION OPHTHALMIC at 10:06

## 2022-08-27 RX ADMIN — TIMOLOL MALEATE 1 DROP: 2.5 SOLUTION OPHTHALMIC at 18:17

## 2022-08-27 RX ADMIN — Medication 1 TABLET: at 18:13

## 2022-08-27 RX ADMIN — Medication 4 UNITS: at 09:38

## 2022-08-27 NOTE — PROGRESS NOTES
Problem: Falls - Risk of  Goal: *Absence of Falls  Description: Document Jaydon Romero Fall Risk and appropriate interventions in the flowsheet. Outcome: Progressing Towards Goal  Note: Fall Risk Interventions:  Mobility Interventions: Bed/chair exit alarm, Patient to call before getting OOB         Medication Interventions: Bed/chair exit alarm, Patient to call before getting OOB, Teach patient to arise slowly    Elimination Interventions: Bed/chair exit alarm, Call light in reach, Patient to call for help with toileting needs    History of Falls Interventions: Bed/chair exit alarm         Problem: Pain  Goal: *Control of Pain  Outcome: Progressing Towards Goal     Problem: Pressure Injury - Risk of  Goal: *Prevention of pressure injury  Description: Document Hermilo Scale and appropriate interventions in the flowsheet.   Outcome: Progressing Towards Goal  Note: Pressure Injury Interventions:  Sensory Interventions: Assess changes in LOC    Moisture Interventions: Absorbent underpads    Activity Interventions: Assess need for specialty bed    Mobility Interventions: HOB 30 degrees or less    Nutrition Interventions: Document food/fluid/supplement intake

## 2022-08-27 NOTE — PROGRESS NOTES
Problem: Self Care Deficits Care Plan (Adult)  Goal: *Therapy Goal (Edit Goal, Insert Text)  Description: Occupational Therapy Goals   Long Term Goals  Initiated 2022 and to be accomplished within 2-3 week(s) 2022  1. Pt will perform self-feeding with Deb. 2. Pt will perform grooming with Deb. 3. Pt will perform UB bathing with Deb  4. Pt will perform LB bathing with Deb. 5. Pt will perform tub/shower transfer with Deb. 6. Pt will perform UB dressing with Deb. 7. Pt will perform LB dressing with Deb. 8. Pt will perform toileting task with Deb. 9. Pt will perform toilet transfer with Deb. 10.  Pt will perform an IADL task with good safety and Deb. Short Term Goals   Initiated 2022 and to be accomplished within 7 day(s) 2022  1. Pt will perform self-feeding with S   2. Pt will perform grooming with S.  3. Pt will perform UB bathing with S.  4. Pt will perform LB bathing with S  5. Pt will perform tub/shower transfer with S.  6. Pt will perform UB dressing with S.  7. Pt will perform LB dressing with S  8. Pt will perform toileting task with S.  9. Pt will perform toilet transfer with S.           Outcome: Progressing Towards Goal  Goal: Interventions  Outcome: Progressing Towards Goal   Occupational Therapy TREATMENT    Patient: Rafael Alfred   78 y.o. Patient identified with name and : yes    Date: 2022    First Tx Session  Time In: 0  Time Out: 26    Diagnosis: Idiopathic transverse myelitis ([facility] [address] [G37.3]   Precautions: Fall, Aspiration precautions, Safety precautions  Chart, occupational therapy assessment, plan of care, and goals were reviewed. Pain:  Pt reports 0/10 pain or discomfort prior to treatment. Pt reports 0/10 pain or discomfort post treatment. Intervention Provided: No complaints of pain at onset, during, or at conclusion of skilled occupational therapy session. SUBJECTIVE:   Patient reports having a \"fear of falling. \" Patient reports having had a controlled fall as a patient at [facility]. OBJECTIVE DATA SUMMARY:     THERAPEUTIC ACTIVITY Daily Assessment    Wheelchair mobility performed (SBA) using BLE's to propel forward over level surfaces; ~186 ft. Pt demonstrating good safety awareness for managing right/ left w/c brakes. Functional lateral squat pivot transfers performed (Min A) w/c <-> elevated seat over toilet to address toileting needs. Patient participated in [address] ball toss/ catching activity with therapist (w/c level); performed to facilitate anterior weight shifting for improved functional ability during sit to stand transitions. Increased task challenge with use of 1.5 lb wrist weights. Performed for range of motion, strengthening/ endurance, and weight shifting for carryover to ADL's. THERAPEUTIC EXERCISE Daily Assessment    BUE there ex performed using 2 lb weighted bar during beach ball/ bump back with therapist. Edu/ instruction on hand placement and technique. Performed 10 reps x2 for range of motion, strengthening, and activity tolerance for increased (I) with self-cares and functional transfers. RUE/ LUE there ex performed using red flexbar (10 lb resistance) 15 reps x1 for forearm pronation/ supination, shoulder internal/ external rotation, and wrist flexion. Vc's for hand placement and pacing. Performed for strengthening for increased (I) with transfers. RUE/ LUE there ex performed using resistive clips for securing onto vertical tabletop stand; followed by removal. Edu/ instructed on alternating upper extremities and pinch patterns (tip pinch, three jaw walter; key pinch); increased task challenge with use of 1.5 lb wrist weights. Performed for functional reach out-front/ overhead, ROM, and pinch strength for improved (I) with self-cares. Intermittent RB due to fatigue.      RUE/ LUE there ex performed using tabletop shoulder ladder; 2 lb weight on wooden bar with 1.5 lb wrist weights. Edu/ instruction with demonstration for technique. Pt performed 13 trials (one trial= up/ down six slots on ladder). Performed for RUE/ LUE ROM and strengthening. Intermittent rest breaks due to arm fatigue. FEEDING/EATING Daily Assessment    Functional Level: 6 (set-up)  Comments: Self-feeding (Mod I) following set-up of lunch tray; pt able to open lids and small containers. GROOMING Daily Assessment    Functional Level: 5 (set-up (w/c at sink))  Tasks Completed by Patient: Washed hands  Comments: Patient performed hand hygiene (set-up) w/c level at sink following toileting self-care. TOILETING Daily Assessment    Functional Level: 4 (Min A)  Comments: Patient demonstrating ability to weight shift from side to side while seated on commode in order to pull shorts and brief down over hips; pt requiring (Min A) in order to pull shorts down completely as they were partially caught underneath the back of her legs and 2/2 rdz catheter. Pt requiring (Min A) for adjusting brief/ shorts up in back at conclusion of toileting task. Pt performed hygiene (set-up) level; pt having BM at this time. MOBILITY/TRANSFERS Daily Assessment    Toilet Transfer Score: 4 (Squat pivot transfer (CGA/ Min A) w/c <-> elevated seat over toilet; therapy gym bathroom (gait belt for safety))  Comments: Squat pivot transfer (CGA/ Min A) w/c <-> elevated seat over toilet; gym bathroom          ASSESSMENT:  Today's skilled occupational therapy session focused on RUE/ LUE there ex (ROM; strengthening), RUE/ LUE there act (beach ball toss/ catching), and functional transfer training for improved (I) and safety with toileting self-care. Patient continues to have decreased functional strength and coordination in her BLE's impacting patient's functional (I) during transfers.  Patient will continue to benefit from skilled occupational therapy interventions to address functional (I) with ADL's and transfers for return to PLOF. Progression toward goals:  []          Improving appropriately and progressing toward goals  [x]          Improving slowly and progressing toward goals  []          Not making progress toward goals and plan of care will be adjusted     PLAN:  Patient continues to benefit from skilled intervention to address the above impairments. Continue treatment per established plan of care. Discharge Recommendations:  Home Health occupational therapy with family assist  Further Equipment Recommendations for Discharge:  bedside commode, gait belt, transfer bench, TBD pending progress     Activity Tolerance:  Fair; intermittent rest breaks due to fatigue. Estimated LOS: TBD pending progress    Please refer to the flowsheet for vital signs taken during this treatment. After treatment:   [x]  Patient left in no apparent distress sitting up in wheelchair with needs met  []  Patient left in no apparent distress in bed  [x]  Call bell left within reach  [x]  Nursing notified  []  Caregiver present  [x]  Wheelchair alarm activated    COMMUNICATION/EDUCATION:   [] Home safety education was provided and the patient/caregiver indicated understanding. [x] Patient/family have participated as able in goal setting and plan of care. [x] Patient/family agree to work toward stated goals and plan of care. [] Patient understands intent and goals of therapy, but is neutral about his/her participation. [] Patient is unable to participate in goal setting and plan of care.     [facility], OT

## 2022-08-27 NOTE — PROGRESS NOTES
Problem: Falls - Risk of  Goal: *Absence of Falls  Description: Document Alex Ayoub Fall Risk and appropriate interventions in the flowsheet.   Outcome: Progressing Towards Goal  Note: Fall Risk Interventions:  Mobility Interventions: Bed/chair exit alarm         Medication Interventions: Bed/chair exit alarm    Elimination Interventions: Call light in reach    History of Falls Interventions: Bed/chair exit alarm         Problem: Patient Education: Go to Patient Education Activity  Goal: Patient/Family Education  Outcome: Progressing Towards Goal     Problem: Pain  Goal: *Control of Pain  Outcome: Progressing Towards Goal

## 2022-08-27 NOTE — PROGRESS NOTES
Problem: Mobility Impaired (Adult and Pediatric)  Goal: *Acute Goals and Plan of Care (Insert Text)  Description: Physical Therapy Short Term Goals  Initiated 2022 and to be accomplished within 7 day(s) (2022)  1. Patient will move from supine to sit and sit to supine  in bed with modified independence. 2.  Patient will transfer from bed to chair and chair to bed with supervision/set-up using the least restrictive device. 3.  Patient will perform sit to stand with maximal assistance. 4.  Patient will propel w/c over level surfaces greater than or equal to 150 feet with modified independence. Physical Therapy Long Term Goals  Initiated 2022 and to be accomplished within 21 day(s) (2022)  1. Patient will move from supine to sit and sit to supine , scoot up and down, and roll side to side in bed with independence. 2.  Patient will transfer from bed to chair and chair to bed with modified independence using the least restrictive device. 3.  Patient will perform sit to stand with minimal assistance/contact guard assist.  4.  Patient will ambulate with moderate assistance  for 25 feet with the least restrictive device. 5.  Patient will ascend/descend 4 stairs with 1 handrail(s) with moderate assistance. 6.  Patient will propel w/c over level surfaces, surfaces of varying grades, thresholds and ramps with modified independence. Outcome: Progressing Towards Goal   PHYSICAL THERAPY TREATMENT    Patient: Ariane Napier (64 y.o. female)  Date: 2022  Diagn[facility]: [facility] [G37.3]   Precautions: Fall, new rdz catheter  Chart, physical therapy assessment, plan of care and goals were reviewed. Time NJ:3740  Time Out:0900  Time In: 0945  Time In: 1030    Patient seen for: Patient education;Balance activities;Transfer training; Therapeutic exercise; Wheelchair mobility    Pain:  Patient denied pain.      Patient identified with name and : yes    SUBJECTIVE:      Patient agreeable to treatment. Patient reporting that she slept well last night. OBJECTIVE DATA SUMMARY:    Objective:       BED/MAT MOBILITY Daily Assessment     Rolling Right : 6 (Modified independent)  Rolling Left : 6 (Modified independent)  Supine to Sit : 6 (Modified independent)  Sit to Supine : 5 (Supervision)      TRANSFERS Daily Assessment     Transfer Type: Lateral pivot  Transfer Assistance : 4 (Minimal assistance)  Sit to Stand Assistance: Moderate assistance (moderate assistance in parallel bars and mod/max assistance with RW)    W/C pushups 3 x 8 reps emphasis on \"nose over toes\" to provide adequate forward trunk weightshift and increased weightbearing bilat LE. Patient also assisted on/off BSC with minimal assistance for maintaining adequate anterior weightshift of trunk over base of support. PT having patient also perform sustained w/c pushup while PT assisting with donning/doffing clothing. Sit to stand reps initially with parallel bars, progressing to RW with cues for hand placement and timing of movement. Patient quickly extending bilat knees to increase stability and relying heavily on bilat UE support.          GAIT Daily Assessment    Gait Description (WDL)      Gait Abnormalities      Assistive device      Ambulation assistance - level surface      Distance      Ambulation assistance- uneven surface      Comments Not able to perform safely at this time        STEPS/STAIRS Daily Assessment     Steps/Stairs ambulated      Assistance Required  (Unable at this time)    Rail Use      Comments  Not able to perform safely at this time    Curbs/Ramps          BALANCE Daily Assessment     Sitting - Static: Good (unsupported)  Sitting - Dynamic: Good (unsupported)  Standing - Static: Poor (bilat UE support of parallel bars)  Standing - Dynamic : Impaired        WHEELCHAIR MOBILITY Daily Assessment     Able to Propel (ft): 186 feet  Functional Level: 4 (intermittent assistance for backing up, negotiating narrow spaces)  Curbs/Ramps Assist Required (FIM Score): 0 (Not tested)  Wheelchair Setup Assist Required : 3 (Moderate assistance)  Wheelchair Management: Manages left brake;Manages right brake (starting to assist with negotiation of armrests)        THERAPEUTIC EXERCISES Daily Assessment       Review of HEP and written handout provided:  Access Code: [email or web address]  URL: [email or web address]  Date: 08/27/2022  Prepared by: Rachel Jacinto    Exercises  Seated March - 1 x daily - 7 x weekly - 3 sets - 10 reps  Seated Long Arc Quad - 1 x daily - 7 x weekly - 3 sets - 10 reps  Seated Heel Toe Raises - 1 x daily - 7 x weekly - 3 sets - 10 reps  Supine Quadricep Sets - 1 x daily - 7 x weekly - 3 sets - 10 reps    During PT session, also performing LAQ 3 x 10 reps with 1.5# ankle weights to improve knee extension strength for coming into standing. Neuro Re-Education:  Patient performing mini squats 2 x 8 reps within parallel bars to improve knee extension neuromuscular control and to improve timing of recruitment of quads for coming into standing. Standing at RW emphasis on postural awareness training and on weightshifting laterally to improve neuromuscular control bilat LE quads and hip extensors. Weightshifting laterally 3 x 8 reps. ASSESSMENT:  Patient continues to have decreased muscular strength, coordination, endurance in bilat LE affecting ability to perform transfers and standing activity safely and independently. Patient better able to tolerate standing activity today with progression to standing activity at RW from parallel bars. Progression toward goals:  [x]      Improving appropriately and progressing toward goals  []      Improving slowly and progressing toward goals  []      Not making progress toward goals and plan of care will be adjusted      PLAN:  Patient continues to benefit from skilled intervention to address the above impairments.   Continue treatment per established plan of care.   Discharge Recommendations:  Home Health  Further Equipment Recommendations for Discharge:  rolling walker, wheelchair 20in      Estimated Discharge Date: 9/14/2022    Activity Tolerance:   Fair due to fatigue    After treatment:   [] Patient left in no apparent distress in bed  [x] Patient left in no apparent distress sitting up in chair  [] Patient left in no apparent distress in w/c mobilizing under own power  [] Patient left in no apparent distress dining area  [] Patient left in no apparent distress mobilizing under own power  [x] Call bell left within reach  [x] Nursing notified  [] Caregiver present  [] Bed alarm activated   [x] Chair alarm activated      Kezia Manzano, PT  8/27/2022

## 2022-08-27 NOTE — PROGRESS NOTES
SHIFT CHANGE NOTE FOR [facility]    Bedside and Verbal shift change report given to Del CABRAL (oncoming nurse) by Gabriella Foley LPN (offgoing nurse). Report included the following information SBAR, Kardex, MAR and Recent Results.     Situation:   Code Status: Full Code   Hospital Day: 4   Problem List:   Hospital Problems  Date Reviewed: 11/18/2019            Codes Class Noted POA    [facility] ICD-10-CM: G37.3  ICD-9-CM: 341.22  8/23/2022 Unknown           Background:   Past Medical History:   Past Medical History:   Diagnosis Date    Allergic rhinitis     Cancer ([facility] [address] 1995    Uterine Cancer (radiation, surgery)    Diverticulosis     GERD (gastroesophageal reflux disease)     Hypercholesteremia     Hypertension     Steatosis of liver 2015        Assessment:  Changes in Assessment throughout shift: No change to previous assessment    Patient has a central line: no Patient has Valle Cath: no   Last Vitals:     Vitals:    08/26/22 2050 08/27/22 0800 08/27/22 0919 08/27/22 1600   BP: 133/86 109/88 109/88 109/73   Pulse: 84 88 (!) 113 83   Resp: 19 16  16   Temp: 97.2 °F (36.2 °C) 97 °F (36.1 °C)  97.7 °F (36.5 °C)   SpO2: 95% 98%     Weight:       Height:           PAIN    Pain Assessment    Pain Intensity 1: 0 (08/27/22 1600) Pain Intensity 1: 2 (12/29/14 1105)      Pain Location 1: Abdomen      Pain Intervention(s) 1: Medication (see MAR)  Patient Stated Pain Goal: 0 Patient Stated Pain Goal: 0  Intervention effective: yes  Other actions taken for pain:      Skin Assessment  Skin color Skin Color: Appropriate for ethnicity  Condition/Temperature Skin Condition/Temp: Dry, Warm  Integrity Skin Integrity: Excoriation (groin)  Turgor    Weekly Pressure Ulcer Documentation  Pressure  Injury Documentation: No Pressure Injury Noted-Pressure Ulcer Prevention Initiated  Wound Prevention & Protection Methods  Orientation of wound Orientation of Wound Prevention: Posterior  Location of Prevention Location of Wound Prevention: Sacrum/Coccyx  Dressing Present Dressing Present : No  Dressing Status    Wound Offloading Wound Offloading (Prevention Methods): Adaptive equipment    Wound Prevention Checklist  Precautions: Fall    []  Heel prevention boots placed on patient    []  Patient turned q2h during shift    []  Hermilo Order Set ordered    []  Patient on [place] bed/Specialty bed    []  Each Wound is documented during shift (Stage, Color, drainage, odor, measurements, and dressings)    []  Dual skin checks done at bedside during shift report with Del CABRAL    INTAKE/OUPUT  Date 08/26/22 0700 - 08/27/22 0659 08/27/22 0700 - 08/28/22 0659   Shift 1225-7819 2812-5353 24 Hour Total 7371-0462 1556-8866 24 Hour Total   INTAKE   P.O.  360  360     P. O.  360  360   Other 0 0 0        Irrigation Volume Input (mL) (Urinary Catheter 08/26/22 Valle) 0 0 0      Shift Total(mL/kg) 900(10.2) 400(4.5) 1300(14.7) 360(4.1)  360(4.1)   OUTPUT   Urine(mL/kg/hr) 550(0.5) 1500(1.4) 2050(1) 700  700     Urine 300  300        Urine Occurrence(s) 2 x 1 x 3 x        Urine Output (mL) (Urinary Catheter 08/26/22 Valle) 250 1500 1750 700  700   Emesis/NG output           Emesis Occurrence(s)  0 x 0 x      Stool           Stool Occurrence(s) 1 x 0 x 1 x 1 x  1 x   Shift Total(mL/kg) 550(6.2) 1500(17) 1646(31.0) 700(7.9)  700(7.9)    -1100 -750 -340  -340   Weight (kg) 88.5 88.5 88.5 88.5 88.5 88.5       Recommendations:  Patient needs and requests: Medication/toileting    Pending tests/procedures: Labs Pending     Functional Level/Equipment: Partial (one person) /      Fall Precautions:   Fall risk precautions were reinforced with the patient; she was instructed to call for help prior to getting up. The following fall risk precautions were continued: bed/ chair alarms, door signage, yellow bracelet and socks as well as update of the Alma Sand tool in the patient's room.    Jose J Score: 4    HEALS Safety Check    A safety check occurred in the patient's room between off going nurse and oncoming nurse listed above. The safety check included the below items  Area Items   H  High Alert Medications Verify all high alert medication drips (heparin, PCA, etc.)   E  Equipment Suction is set up for ALL patients (with ryan)  Red plugs utilized for all equipment (IV pumps, etc.)  WOWs wiped down at end of shift. Room stocked with oxygen, suction, and other unit-specific supplies   A  Alarms Bed alarm is set for fall risk patients  Ensure chair alarm is in place and activated if patient is up in a chair   L  Lines Check IV for any infiltration  Valle bag is empty if patient has a Valle   Tubing and IV bags are labeled   S  Safety   Room is clean, patient is clean, and equipment is clean. Hallways are clear from equipment besides carts. Fall bracelet on for fall risk patients  Ensure room is clear and free of clutter  Suction is set up for ALL patients (with ryan)  Hallways are clear from equipment besides carts.    Isolation precautions followed, supplies available outside room, sign posted     Tom Jackson LPN

## 2022-08-28 LAB
GLUCOSE BLD STRIP.AUTO-MCNC: 122 MG/DL (ref 70–110)
GLUCOSE BLD STRIP.AUTO-MCNC: 137 MG/DL (ref 70–110)
GLUCOSE BLD STRIP.AUTO-MCNC: 152 MG/DL (ref 70–110)
GLUCOSE BLD STRIP.AUTO-MCNC: 98 MG/DL (ref 70–110)

## 2022-08-28 PROCEDURE — 97110 THERAPEUTIC EXERCISES: CPT

## 2022-08-28 PROCEDURE — 2709999900 HC NON-CHARGEABLE SUPPLY

## 2022-08-28 PROCEDURE — 74011250637 HC RX REV CODE- 250/637: Performed by: HOSPITALIST

## 2022-08-28 PROCEDURE — 82962 GLUCOSE BLOOD TEST: CPT

## 2022-08-28 PROCEDURE — 97530 THERAPEUTIC ACTIVITIES: CPT

## 2022-08-28 PROCEDURE — 97535 SELF CARE MNGMENT TRAINING: CPT

## 2022-08-28 PROCEDURE — 74011636637 HC RX REV CODE- 636/637: Performed by: EMERGENCY MEDICINE

## 2022-08-28 PROCEDURE — 74011250637 HC RX REV CODE- 250/637: Performed by: EMERGENCY MEDICINE

## 2022-08-28 PROCEDURE — 65310000000 HC RM PRIVATE REHAB

## 2022-08-28 RX ADMIN — TIMOLOL MALEATE 1 DROP: 2.5 SOLUTION OPHTHALMIC at 10:44

## 2022-08-28 RX ADMIN — Medication 1 TABLET: at 08:47

## 2022-08-28 RX ADMIN — HYDROCHLOROTHIAZIDE 12.5 MG: 25 TABLET ORAL at 08:47

## 2022-08-28 RX ADMIN — TIMOLOL MALEATE 1 DROP: 2.5 SOLUTION OPHTHALMIC at 17:31

## 2022-08-28 RX ADMIN — METFORMIN HYDROCHLORIDE 500 MG: 500 TABLET ORAL at 08:47

## 2022-08-28 RX ADMIN — Medication 1 TABLET: at 17:31

## 2022-08-28 RX ADMIN — Medication 2 UNITS: at 21:22

## 2022-08-28 RX ADMIN — FLUTICASONE PROPIONATE 2 SPRAY: 50 SPRAY, METERED NASAL at 10:45

## 2022-08-28 RX ADMIN — LOSARTAN POTASSIUM 50 MG: 50 TABLET, FILM COATED ORAL at 08:47

## 2022-08-28 RX ADMIN — TAMSULOSIN HYDROCHLORIDE 0.4 MG: 0.4 CAPSULE ORAL at 08:47

## 2022-08-28 RX ADMIN — ATORVASTATIN CALCIUM 40 MG: 40 TABLET, FILM COATED ORAL at 21:21

## 2022-08-28 NOTE — PROGRESS NOTES
SHIFT CHANGE NOTE FOR [facility]    Bedside and Verbal shift change report given to MISHA Thrasher(oncoming nurse) by Anna Conti RN (offgoing nurse). Report included the following information SBAR, Kardex, MAR and Recent Results.     Situation:   Code Status: Full Code   Hospital Day: 5   Problem List:   Hospital Problems  Date Reviewed: 11/18/2019            Codes Class Noted POA    [facility] ICD-10-CM: G37.3  ICD-9-CM: 341.22  8/23/2022 Unknown         Background:   Past Medical History:   Past Medical History:   Diagnosis Date    Allergic rhinitis     Cancer ([facility] [address] [facility] 1995    Uterine Cancer (radiation, surgery)    Diverticulosis     GERD (gastroesophageal reflux disease)     Hypercholesteremia     Hypertension     Steatosis of liver 2015        Assessment:  Changes in Assessment throughout shift:      Patient has a central line: no Patient has Valle Cath: no   Last Vitals:     Vitals:    08/26/22 2050 08/27/22 0800 08/27/22 0919 08/27/22 1600   BP: 133/86 109/88 109/88 109/73   Pulse: 84 88 (!) 113 83   Resp: 19 16  16   Temp: 97.2 °F (36.2 °C) 97 °F (36.1 °C)  97.7 °F (36.5 °C)   SpO2: 95% 98%     Weight:       Height:           PAIN    Pain Assessment    Pain Intensity 1: 0 (08/28/22 0400) Pain Intensity 1: 2 (12/29/14 1105)      Pain Location 1: Abdomen      Pain Intervention(s) 1: Medication (see MAR)  Patient Stated Pain Goal: 0 Patient Stated Pain Goal: 0  Intervention effective: yes  Other actions taken for pain:      Skin Assessment  Skin color Skin Color: Appropriate for ethnicity  Condition/Temperature Skin Condition/Temp: Dry, Warm  Integrity Skin Integrity: Excoriation (groin)  Turgor    Weekly Pressure Ulcer Documentation  Pressure  Injury Documentation: No Pressure Injury Noted-Pressure Ulcer Prevention Initiated  Wound Prevention & Protection Methods  Orientation of wound Orientation of Wound Prevention: Posterior  Location of Prevention Location of Wound Prevention: Buttocks, Sacrum/Coccyx  Dressing Present Dressing Present : No  Dressing Status    Wound Offloading Wound Offloading (Prevention Methods): Adaptive equipment    Wound Prevention Checklist  Precautions: Fall    []  Heel prevention boots placed on patient    []  Patient turned q2h during shift    []  Hermilo Order Set ordered    []  Patient on Ch[place] bed/Specialty bed    []  Each Wound is documented during shift (Stage, Color, drainage, odor, measurements, and dressings)    []  Dual skin checks done at bedside during shift report with MISHA Thrasher    INTAKE/OUPUT  Date 08/27/22 0700 - 08/28/22 0659 08/28/22 0700 - 08/29/22 0659   Shift 3161-7484 5271-1410 24 Hour Total 0961-1360 6668-7138 24 Hour Total   INTAKE   P.O. 600  600        P. O. 600  600      Shift Total(mL/kg) 600(6.8)  600(6.8)      OUTPUT   Urine(mL/kg/hr) 1500(1.4) 700 2200        Urine Occurrence(s) 1 x  1 x        Urine Output (mL) (Urinary Catheter 08/26/22 Valle) 7107 862 0931      Stool           Stool Occurrence(s) 3 x  3 x      Shift Total(mL/kg) 1500(17) 700(7.9) 2200(24.9)      NET -900 -700 -1600      Weight (kg) 88.5 88.5 88.5 88.5 88.5 88.5         Recommendations:  Patient needs and requests: None voiced     Pending tests/procedures: n/a    Functional Level/Equipment:   / Bedside Commode    Fall Precautions:   Fall risk precautions were reinforced with the patient; she was instructed to call for help prior to getting up. The following fall risk precautions were continued: bed/ chair alarms, door signage, yellow bracelet and socks as well as update of the Leafy Garcia tool in the patient's room. Jose J Score: 4    HEALS Safety Check    A safety check occurred in the patient's room between off going nurse and oncoming nurse listed above.     The safety check included the below items  Area Items   H  High Alert Medications Verify all high alert medication drips (heparin, PCA, etc.)   E  Equipment Suction is set up for ALL patients (with ryan)  Red plugs utilized for all equipment (IV pumps, etc.)  WOWs wiped down at end of shift. Room stocked with oxygen, suction, and other unit-specific supplies   A  Alarms Bed alarm is set for fall risk patients  Ensure chair alarm is in place and activated if patient is up in a chair   L  Lines Check IV for any infiltration  Valle bag is empty if patient has a Valle   Tubing and IV bags are labeled   S  Safety   Room is clean, patient is clean, and equipment is clean. Hallways are clear from equipment besides carts. Fall bracelet on for fall risk patients  Ensure room is clear and free of clutter  Suction is set up for ALL patients (with ryan)  Hallways are clear from equipment besides carts.    Isolation precautions followed, supplies available outside room, sign posted     Naila Guardado RN

## 2022-08-28 NOTE — PROGRESS NOTES
Problem: Self Care Deficits Care Plan (Adult)  Goal: *Therapy Goal (Edit Goal, Insert Text)  Description: Occupational Therapy Goals   Long Term Goals  Initiated 2022 and to be accomplished within 2-3 week(s) 2022  1. Pt will perform self-feeding with Deb. 2. Pt will perform grooming with Deb. 3. Pt will perform UB bathing with Deb  4. Pt will perform LB bathing with Deb. 5. Pt will perform tub/shower transfer with Deb. 6. Pt will perform UB dressing with Deb. 7. Pt will perform LB dressing with Deb. 8. Pt will perform toileting task with Deb. 9. Pt will perform toilet transfer with Deb. 10.  Pt will perform an IADL task with good safety and Deb. Short Term Goals   Initiated 2022 and to be accomplished within 7 day(s) 2022  1. Pt will perform self-feeding with S   2. Pt will perform grooming with S.  3. Pt will perform UB bathing with S.  4. Pt will perform LB bathing with S  5. Pt will perform tub/shower transfer with S.  6. Pt will perform UB dressing with S.  7. Pt will perform LB dressing with S  8. Pt will perform toileting task with S.  9. Pt will perform toilet transfer with S.      Occupational Therapy TREATMENT    Patient: Juan Judge   78 y.o. Patient identified with name and : Yes     Date: 2022    First Tx Session  Time In: 1100  Time Out[de-identified]       Diagnosis: Idiopathic transverse myelitis ([facility] [address] [G37.3]   Precautions: Fall  Chart, occupational therapy assessment, plan of care, and goals were reviewed. Pain:  Pt reports 5/10 stiffness at BLE     Intervention Provided: Nursing made aware      SUBJECTIVE:   Patient stated \" I am used to taking Bio Flex  regarding stiffness in BLE. OBJECTIVE DATA SUMMARY:       THERAPEUTIC EXERCISE Daily Assessment    Pt propelled w/c from room<>gym with S using BLE to promote increase endurance and strength for ADLs. Sci fit up to 10 minutes at level 1.5 to promote strength for ADLs. Instruct pt. in home exercise program: UB HEP (chess press, chest pulls, press front raise, and upright rows   2x15 with 2lb weights in hand. Handout given    Theraflexbar (yellow) 2x15 in pronation/supination and wrist extension/flexion to promote FM strength for ADLs. LOWER BODY DRESSING Daily Assessment     Pt perform threading of pants with Ta requiring asst to thread catheter. Pt performed clothing management in standing using RW with moderate asst            Sock and/or Shoe management: 5 setup       MOBILITY/TRANSFERS Daily Assessment     Sit to stand with Ta in prep for clothing management. EOB to w/c with SBA. ASSESSMENT:  Pt increasing BUE strength and standing balance for ADLs. Pt would benefit from timed Bowel routine. Nursing made aware again. Progression toward goals:  []          Improving appropriately and progressing toward goals  [x]          Improving slowly and progressing toward goals  []          Not making progress toward goals and plan of care will be adjusted     PLAN:  Patient continues to benefit from skilled intervention to address the above impairments. Continue treatment per established plan of care. Discharge Recommendations:  Home Health  Further Equipment Recommendations for Discharge:  bedside commode and tub transfer bench     Activity Tolerance:  Fair       Estimated LOS:    Please refer to the flowsheet for vital signs taken during this treatment. After treatment:   [x]  Patient left in no apparent distress sitting up in chair   []  Patient left in no apparent distress in bed  []  Call bell left within reach  []  Nursing notified  []  Caregiver present  []  Bed alarm activated    COMMUNICATION/EDUCATION:   [] Home safety education was provided and the patient/caregiver indicated understanding. [x] Patient/family have participated as able in goal setting and plan of care. [] Patient/family agree to work toward stated goals and plan of care.   [] Patient understands intent and goals of therapy, but is neutral about his/her participation. [] Patient is unable to participate in goal setting and plan of care.       Cong Flynn, OT      Outcome: Progressing Towards Goal  Goal: Interventions  Outcome: Progressing Towards Goal

## 2022-08-28 NOTE — PROGRESS NOTES
[facility]    Bedside and Verbal shift change report given to Bhupinder N Hollyalie   (oncoming nurse) by Ricardo Mcmillan RN (offgoing nurse). Report included the following information SBAR, Kardex, MAR and Recent Results.     Situation:   Code Status: Full Code   Hospital Day: 5   Problem List:   Hospital Problems  Date Reviewed: 11/18/2019            Codes Class Noted POA    Idiopathic transverse myelitis ([facility] ICD-10-CM: G37.3  ICD-9-CM: 341.22  8/23/2022 Unknown           Background:   Past Medical History:   Past Medical History:   Diagnosis Date    Allergic rhinitis     Cancer ([facility] [address] 1995    Uterine Cancer (radiation, surgery)    Diverticulosis     GERD (gastroesophageal reflux disease)     Hypercholesteremia     Hypertension     Steatosis of liver 2015        Assessment:  Changes in Assessment throughout shift: No change to previous assessment    Patient has Rdz Cath: yes Reasons if yes: acute urinary retention   Insertion date:08/26/22  Shift rdz care completed: YES    Last Vitals:     Vitals:    08/27/22 0919 08/27/22 1600 08/28/22 0727 08/28/22 1613   BP: 109/88 109/73 128/74 133/85   Pulse: (!) 113 83 78 90   Resp:  16 16 18   Temp:  97.7 °F (36.5 °C) 97 °F (36.1 °C) 97 °F (36.1 °C)   SpO2:   97% 97%   Weight:       Height:           PAIN    Pain Assessment    Pain Intensity 1: 0 (08/28/22 1613) Pain Intensity 1: 2 (12/29/14 1105)      Pain Location 1: Abdomen      Pain Intervention(s) 1: Medication (see MAR)  Patient Stated Pain Goal: 0 Patient Stated Pain Goal: 0  Intervention effective: yes  Other actions taken for pain:      Skin Assessment  Skin color Skin Color: Appropriate for ethnicity  Condition/Temperature Skin Condition/Temp: Dry, Warm  Integrity Skin Integrity: Excoriation (groin)  Turgor    Weekly Pressure Ulcer Documentation  Pressure  Injury Documentation: No Pressure Injury Noted-Pressure Ulcer Prevention Initiated  Wound Prevention & Protection Methods  Orientation of wound Orientation of Wound Prevention: Posterior  Location of Prevention Location of Wound Prevention: Buttocks, Sacrum/Coccyx  Dressing Present Dressing Present : No  Dressing Status    Wound Offloading Wound Offloading (Prevention Methods): Adaptive equipment    Wound Prevention Checklist  Precautions: Fall    []  Heel prevention boots placed on patient    []  Patient turned q2h during shift    []  Hermilo Order Set ordered    []  Patient on Ch[place] bed/Specialty bed    []  Each Wound is documented during shift (Stage, Color, drainage, odor, measurements, and dressings)    [x]  Dual skin checks done at bedside during shift report with Monique Holly RN     INTAKE/OUPUT  Date 08/27/22 0700 - 08/28/22 0659 08/28/22 0700 - 08/29/22 0659   Shift 2152-8171 1126-4608 24 Hour Total 8853-7570 3670-2700 24 Hour Total   INTAKE   P.O. 600  600 240  240     P. O. 600  600 240  240   Shift Total(mL/kg) 600(6.8)  600(6.8) 240(2.7)  240(2.7)   OUTPUT   Urine(mL/kg/hr) 1500(1.4) 700(0.7) 2200(1) 1960  1960     Urine Occurrence(s) 1 x  1 x        Urine Output (mL) (Urinary Catheter 08/26/22 Valle) 1500 700 [address]   Stool           Stool Occurrence(s) 3 x  3 x 0 x  0 x   Shift Total(mL/kg) 1500(17) 700(7.9) 2200(24.9) 6919(83.1)  6219(83.3)   NET -900 -700 -1600 -1720  -1720   Weight (kg) 88.5 88.5 88.5 88.5 88.5 88.5       Recommendations:  Patient needs and requests: toileting help    Pending tests/procedures: routine labs     Functional Level/Equipment: Partial (one person) / Wheelchair    Fall Precautions:   Fall risk precautions were reinforced with the patient; she was instructed to call for help prior to getting up. The following fall risk precautions were continued: bed/ chair alarms, door signage, yellow bracelet and socks as well as update of the Macie Led tool in the patient's room.    Jose J Score: 4    HEALS Safety Check    A safety check occurred in the patient's room between off going nurse and oncoming nurse listed above.    The safety check included the below items  Area Items   H  High Alert Medications Verify all high alert medication drips (heparin, PCA, etc.)   E  Equipment Suction is set up for ALL patients (with ryan)  Red plugs utilized for all equipment (IV pumps, etc.)  WOWs wiped down at end of shift. Room stocked with oxygen, suction, and other unit-specific supplies   A  Alarms Bed alarm is set for fall risk patients  Ensure chair alarm is in place and activated if patient is up in a chair   L  Lines Check IV for any infiltration  Valle bag is empty if patient has a Valle   Tubing and IV bags are labeled   S  Safety   Room is clean, patient is clean, and equipment is clean. Hallways are clear from equipment besides carts. Fall bracelet on for fall risk patients  Ensure room is clear and free of clutter  Suction is set up for ALL patients (with ryan)  Hallways are clear from equipment besides carts.    Isolation precautions followed, supplies available outside room, sign posted     Toni Corbin RN  BSN

## 2022-08-29 LAB
GLUCOSE BLD STRIP.AUTO-MCNC: 107 MG/DL (ref 70–110)
GLUCOSE BLD STRIP.AUTO-MCNC: 121 MG/DL (ref 70–110)
GLUCOSE BLD STRIP.AUTO-MCNC: 202 MG/DL (ref 70–110)
GLUCOSE BLD STRIP.AUTO-MCNC: 88 MG/DL (ref 70–110)

## 2022-08-29 PROCEDURE — 97116 GAIT TRAINING THERAPY: CPT

## 2022-08-29 PROCEDURE — 99232 SBSQ HOSP IP/OBS MODERATE 35: CPT | Performed by: INTERNAL MEDICINE

## 2022-08-29 PROCEDURE — 97112 NEUROMUSCULAR REEDUCATION: CPT

## 2022-08-29 PROCEDURE — 97150 GROUP THERAPEUTIC PROCEDURES: CPT

## 2022-08-29 PROCEDURE — 97110 THERAPEUTIC EXERCISES: CPT

## 2022-08-29 PROCEDURE — 74011636637 HC RX REV CODE- 636/637: Performed by: EMERGENCY MEDICINE

## 2022-08-29 PROCEDURE — 97530 THERAPEUTIC ACTIVITIES: CPT

## 2022-08-29 PROCEDURE — 74011250637 HC RX REV CODE- 250/637: Performed by: HOSPITALIST

## 2022-08-29 PROCEDURE — 65310000000 HC RM PRIVATE REHAB

## 2022-08-29 PROCEDURE — 74011250637 HC RX REV CODE- 250/637: Performed by: EMERGENCY MEDICINE

## 2022-08-29 PROCEDURE — 82962 GLUCOSE BLOOD TEST: CPT

## 2022-08-29 RX ORDER — DICLOFENAC SODIUM 10 MG/G
2 GEL TOPICAL
Status: DISCONTINUED | OUTPATIENT
Start: 2022-08-29 | End: 2022-09-03 | Stop reason: HOSPADM

## 2022-08-29 RX ADMIN — Medication 1 TABLET: at 17:07

## 2022-08-29 RX ADMIN — TIMOLOL MALEATE 1 DROP: 2.5 SOLUTION OPHTHALMIC at 09:05

## 2022-08-29 RX ADMIN — Medication 4 UNITS: at 09:04

## 2022-08-29 RX ADMIN — Medication 1 TABLET: at 09:04

## 2022-08-29 RX ADMIN — METFORMIN HYDROCHLORIDE 500 MG: 500 TABLET ORAL at 09:01

## 2022-08-29 RX ADMIN — TAMSULOSIN HYDROCHLORIDE 0.4 MG: 0.4 CAPSULE ORAL at 09:04

## 2022-08-29 RX ADMIN — TIMOLOL MALEATE 1 DROP: 2.5 SOLUTION OPHTHALMIC at 17:08

## 2022-08-29 RX ADMIN — ATORVASTATIN CALCIUM 40 MG: 40 TABLET, FILM COATED ORAL at 21:08

## 2022-08-29 NOTE — PROGRESS NOTES
Problem: Mobility Impaired (Adult and Pediatric)  Goal: *Acute Goals and Plan of Care (Insert Text)  Description: Physical Therapy Short Term Goals  Initiated 2022 and to be accomplished within 7 day(s) (2022)  1. Patient will move from supine to sit and sit to supine  in bed with modified independence. 2.  Patient will transfer from bed to chair and chair to bed with supervision/set-up using the least restrictive device. 3.  Patient will perform sit to stand with maximal assistance. 4.  Patient will propel w/c over level surfaces greater than or equal to 150 feet with modified independence. Physical Therapy Long Term Goals  Initiated 2022 and to be accomplished within 21 day(s) (2022)  1. Patient will move from supine to sit and sit to supine , scoot up and down, and roll side to side in bed with independence. 2.  Patient will transfer from bed to chair and chair to bed with modified independence using the least restrictive device. 3.  Patient will perform sit to stand with minimal assistance/contact guard assist.  4.  Patient will ambulate with moderate assistance  for 25 feet with the least restrictive device. 5.  Patient will ascend/descend 4 stairs with 1 handrail(s) with moderate assistance. 6.  Patient will propel w/c over level surfaces, surfaces of varying grades, thresholds and ramps with modified independence. Outcome: Progressing Towards Goal   PHYSICAL THERAPY TREATMENT    Patient: Opal Rodríguez (58 y.o. female)  Date: 2022  Diagnosis: Idiopathic transverse myelitis [facility] [G37.3]   Precautions: Fall  Chart, physical therapy assessment, plan of care and goals were reviewed. Time In: 0800  Time Out: 0900  Time In:1102  Time Out:1135    Patient seen for: Balance activities;Gait training;Patient education; Therapeutic exercise;Transfer training; Wheelchair mobility    Pain:  Patient denied pain during session    Patient identified with name and : yes    SUBJECTIVE:      Patient agreeable to treatment. Reporting that she was nervous with standing activity performed with OT yesterday, willing to participate in more standing activity today. OBJECTIVE DATA SUMMARY:    Objective:       BED/MAT MOBILITY Daily Assessment     Rolling Right : 6 (Modified independent)  Rolling Left : 6 (Modified independent)  Supine to Sit : 6 (Modified independent)      TRANSFERS Daily Assessment     Transfer Type: Lateral pivot  Other: stand step with RW  Transfer Assistance : 4 (Contact guard assistance) (min/mod A with RW for stand step transfer)  Sit to Stand Assistance: Moderate assistance (minimal to moderate assistance to ensure using bilat LE appropriately to control sit<->stand and not relying on compensatory strategy with pushing posterior knees into mat/transfer surface to get into standing.)    Sit to stand reps from 23 1/2 inch height mat table, emphasis hands on distal thighs for increased forced use bilat LE and improved functional LE strength. Performing 3 x 5-6 reps depending on fatigue, PT facilitating appropriate anterior weightshift of trunk over base of support to avoid posterior loss of balance or use of posterior thighs/knees to push against mat table to get into standing. Progressing to stand step transfers with RW today, needing cues for safe sequencing and foot placement within RW during turning and stepping. Patient with unsteady, ataxic LE movements needing additional support for safety. GAIT Daily Assessment    Gait Description (WDL) Exceptions to WDL    Gait Abnormalities Ataxic; Step to gait; Decreased step clearance;Trunk sway increased (narrow COLIN, decreased gait speed)    Assistive device Gait belt;Walker, rolling    Ambulation assistance - level surface 3 (Moderate assistance) (second person for w/c follow due to patient's increased fear of falling)    Distance 10 Feet (ft) (10 ft, then 18 ft)    Ambulation assistance- uneven surface NT yet    Comments Cues for wider COLIN and improved foot placement, decreased step length left LE with fatigue. STEPS/STAIRS Daily Assessment     Steps/Stairs ambulated      Assistance Required  (Unable at this time)    Rail Use      Comments  Not yet able to assess safely    Curbs/Ramps          BALANCE Daily Assessment     Sitting - Static: Good (unsupported)  Sitting - Dynamic: Good (unsupported)  Standing - Static: Fair  Standing - Dynamic : Impaired        WHEELCHAIR MOBILITY Daily Assessment     Able to Propel (ft): 186 feet  Functional Level: 5  Curbs/Ramps Assist Required (FIM Score): 0 (Not tested)  Wheelchair Setup Assist Required : 3 (Moderate assistance)  Wheelchair Management: Manages right brake;Manages left brake (intermittent assistance with negotiation of armrests needed.)        THERAPEUTIC EXERCISES Daily Assessment       Standing therex at RW for improved ability to perform standing activity and in preparation for gait training:  - mini squats 3 x 6-8 reps depending on fatigue  - hip flex alternating marches 3 x 9 reps    W/C pushups 3 x 8 reps for improved UE/LE coordination and increased LE engagement      Neuro Re-Education:  Static standing at RW with emphasis on upright posture, increased quad and glute engagement, decreased UE support of RW.     ASSESSMENT:  Patient demonstrating improved ability to participate in standing today and was able to initiate gait training. Patient continues to have significant muscular fatigue and poor neuromuscular control resulting in unsafe gait pattern. Recommend w/c as primary mode of mobility upon discharge. Progression toward goals:  []      Improving appropriately and progressing toward goals  [x]      Improving slowly and progressing toward goals  []      Not making progress toward goals and plan of care will be adjusted      PLAN:  Patient continues to benefit from skilled intervention to address the above impairments.   Continue treatment per established plan of care.   Discharge Recommendations:  Home Health  Further Equipment Recommendations for Discharge:  RW, lightweight w/c      Estimated Discharge Date: 9/3/2022    Activity Tolerance:   Fair due to fatigue    After treatment:   [] Patient left in no apparent distress in bed  [x] Patient left in no apparent distress sitting up in chair after first session  [x] Patient left in no apparent distress in w/c mobilizing under own power after second session  [] Patient left in no apparent distress dining area  [] Patient left in no apparent distress mobilizing under own power  [x] Call bell left within reach  [x] Nursing notified  [] Caregiver present  [] Bed alarm activated   [x] Chair alarm activated      Pastor Alex, PT  8/29/2022

## 2022-08-29 NOTE — PROGRESS NOTES
Problem: Self Care Deficits Care Plan (Adult)  Goal: *Therapy Goal (Edit Goal, Insert Text)  Description: Occupational Therapy Goals   Long Term Goals  Initiated 2022 and to be accomplished within 2-3 week(s) 2022  1. Pt will perform self-feeding with Deb. 2. Pt will perform grooming with Deb. 3. Pt will perform UB bathing with Deb  4. Pt will perform LB bathing with Deb. 5. Pt will perform tub/shower transfer with Deb. 6. Pt will perform UB dressing with Deb. 7. Pt will perform LB dressing with Deb. 8. Pt will perform toileting task with Deb. 9. Pt will perform toilet transfer with Deb. 10.  Pt will perform an IADL task with good safety and Deb. Short Term Goals   Initiated 2022 and to be accomplished within 7 day(s) 2022  1. Pt will perform self-feeding with S   2. Pt will perform grooming with S.  3. Pt will perform UB bathing with S.  4. Pt will perform LB bathing with S  5. Pt will perform tub/shower transfer with S.  6. Pt will perform UB dressing with S.  7. Pt will perform LB dressing with S  8. Pt will perform toileting task with S.  9. Pt will perform toilet transfer with S.      Occupational Therapy TREATMENT    Patient: Naye Last   78 y.o. Patient identified with name and : Yes    Date: 2022    First Tx Session  Time In: 0930  Time Out[de-identified] 6437    Second Tx Session Group  Time In: 1030  Time Out[de-identified] 1100    Diagnosis: Idiopathic transverse myelitis ([facility] [G37.3]   Precautions: Fall  Chart, occupational therapy assessment, plan of care, and goals were reviewed. Pain:  Pt reports soreness in both knees. SUBJECTIVE:   Patient pleasant and cooperative.     OBJECTIVE DATA SUMMARY:     THERAPEUTIC ACTIVITY Daily Assessment    Pt participated in simple Peg Board task in standing to increase standing balance/tolerance for ADLs. Pt able to stand up 5 minutes placing/taking out  pegs x50.     Pt participated in Connect 4 task x3 seated in chair to promote BUE strength and FM dexterity/manipulation for ADLs. The third trial 2-1/2lb weights applied to wrist.  (Group)    Pt completed 60 piece puzzle to increase strength and promote FM dexterity with 1-1/2 weight on both wrists and puzzle surface on a 65 inch incline. THERAPEUTIC EXERCISE Daily Assessment    Sci fit up to 10 minutes at level 2.0.     UB HEP (bicep curls, chess press, chest pulls, side  raise, upright rows  2x15 with 2lb weight in hand  Theraflex bar (red) supination/pronation and wrist extension/flexion              MOBILITY/TRANSFERS Daily Assessment     Sit to stand with CGA. ASSESSMENT:  Pt increasing strength, balance, and endurance for ADLs. Progression toward goals:  [x]          Improving appropriately and progressing toward goals  []          Improving slowly and progressing toward goals  []          Not making progress toward goals and plan of care will be adjusted     PLAN:  Patient continues to benefit from skilled intervention to address the above impairments. Continue treatment per established plan of care. Discharge Recommendations:  Home Health  Further Equipment Recommendations for Discharge:  bedside commode and transfer bench     Activity Tolerance:  Fair      Estimated LOS: 9/6/22    Please refer to the flowsheet for vital signs taken during this treatment. After treatment:   []  Patient left in no apparent distress sitting up in chair   [x]  Patient left in no apparent distress in bed  []  Call bell left within reach  []  Nursing notified  []  Caregiver present  []  Bed alarm activated    COMMUNICATION/EDUCATION:   [] Home safety education was provided and the patient/caregiver indicated understanding. [x] Patient/family have participated as able in goal setting and plan of care. [] Patient/family agree to work toward stated goals and plan of care. [] Patient understands intent and goals of therapy, but is neutral about his/her participation.   [] Patient is unable to participate in goal setting and plan of care.       Leesa Mao, OT      Outcome: Progressing Towards Goal  Goal: Interventions  Outcome: Progressing Towards Goal

## 2022-08-29 NOTE — INTERDISCIPLINARY ROUNDS
[address]  [address] [place] [address], [address]     INPATIENT REHABILITATION  DISCHARGE RECOMMENDATION SHEET    Date: 8/29/2022     Name: Ariane Napier Age / Sex: 78 y.o. / female   CSN: [id number] MRN: [id number]   [facility] Date: 8/23/2022 Discharge Date: 9/3/2022        [address]      Height  []  Straight cane  [x] DMV Handicap Placard    []  #14 ½ baljinder height  []  Forearm crutches OUTPATIENT    [x]  Baljinder height  []  Axillary crutches  []  PT    []  Standard height  []  LBQC  []  OT    []  Reclining high back  []  SBQC  []  ST       Weight  HOME HEALTH    []  Standard weight WALKERS  [x]  PT    [x]  Lightweight  []  Standard height  [x]  OT    []  High-strength lightweight  []  Small adult  []  ST    []  Heavy Duty   []  Tall walker  [x]  Nursing    [x]  Patient is unable to self-propel a standard weight wheelchair  [x]  Rolling walker with 5 single fixed wheels  []  Wound care  Location of wound:  Specify needs:      [x]  Anticoagulation management       Width  []  Bariatric walker  [x]  Diabetes management    []  Narrow (16)  []  Small Adult Rolling walker with 5 single fixed wheels  [x]  Insulin management    []  Standard (18) ACCESSORIES  []  No insulin management    [x]  Wide (20)  []  Rear sure glide brakes  [x]  BP management    []  Other  []  10 rear wheel brake  []  CHF Telehealth       Arms  []  Tall leg extensions  []  Post-CABG care/monitoring    []  Standard  []  Other:  []  Colostomy care    [x]  Desk   []  Tube feeding    [x]  Removable BATHROOM EQUIPMENT  []  PICC line care      Foot/Leg Rests  [x]  Bedside commode  [x]  Valle catheter care    [x]  Removable  []  Extra wide bedside commode  []  Other:     []  Elevating  []  3:1 commode WITH drop arms  []  Medical Social Worker      Other  [x]  Tub transfer bench  [x]  Aide    []  Brake extensions  []  Shower chair     []  Padded gloves       [x]  Antitippers       [] Right Arm Tray      []  Left Arm Tray          CUSHIONS OTHER     [x]  Foam cushion  []  Seat belt     []  Gel cushion  []  Gait belt     []  Estefania Nassar II  []  Transfer board - Size:     []  Roho  []  Oxygen     []  Other  []  CPM      []  [address]  []  Ramp     []  Hospital bed  []  Hip kit     []  Special mattress  []  Reacher     []  Trapeze bar         Physical Therapy Raoul Morrow, PT, DPT   Occupational Therapy kC Husbands, MSOTR/L   Speech-Language Therapy    Social Work Devin Teixeira, MSW   Nursing Ambreen Rucker LPN

## 2022-08-29 NOTE — PROGRESS NOTES
[address]  [address] [place] [address], [address]     INPATIENT REHABILITATION  DAILY PROGRESS NOTE     Date: 8/29/2022    Name: Matt Mao Age / Sex: 78 y.o. / female   CSN: [id number] MRN: [id number]   [facility] Date: 8/23/2022 Length of Stay: 6 days     Primary Rehabilitation Diagnosis: Impaired Mobility and ADLs secondary to Acute Transverse Myelitis       Subjective:     Patient was sitting up in a wheelchair. She says she has had some knee discomfort/stiffness after therapy. Denies any headaches or dizziness. No back pain currently. Continues to have some numbness in both lower extremities. No abdominal pain or shortness of breath. No cough. No nausea or vomiting. Pain Control: no current joint or muscle symptoms, essentially pain-free      Objective:     Vital Signs:  Patient Vitals for the past 24 hrs:   BP Temp Pulse Resp SpO2   08/29/22 0803 111/76 97.5 °F (36.4 °C) (!) 113 16 96 %   08/28/22 2015 126/84 97 °F (36.1 °C) 93 18 96 %   08/28/22 1613 133/85 97 °F (36.1 °C) 90 18 97 %        Physical Examination:  GENERAL SURVEY: Patient is awake, alert, oriented x 3,  not in acute respiratory distress. CHEST/LUNGS: symmetrical chest expansion, good air entry, clear breath sounds  HEART: adynamic precordium, good S1 S2, no S3, regular rhythm, no murmurs  ABDOMEN: flat, bowel sounds appreciated, soft, non-tender, Valle catheter in situ. EXTREMITIES: No pedal edema. No erythema or edema of both knees. NEUROLOGICAL EXAM: Awake, no facial droop, follows verbal commands appropriately, motor strength 4 out of 5 in both lower extremities and 5-5 in both upper extremities, sensation touch normal currently in both upper extremities, no tremors noted.     Current Medications:  Current Facility-Administered Medications   Medication Dose Route Frequency    tamsulosin (FLOMAX) capsule 0.4 mg  0.4 mg Oral DAILY    pneumococcal 23-valent (PNEUMOVAX 23) injection 0.5 mL  0.5 mL IntraMUSCular PRIOR TO DISCHARGE    atorvastatin (LIPITOR) tablet 40 mg  40 mg Oral QHS    calcium-vitamin D 600 mg-5 mcg (200 unit) per tablet 1 Tablet  1 Tablet Oral BID    cetirizine (ZYRTEC) tablet 5 mg  5 mg Oral DAILY PRN    losartan (COZAAR) tablet 50 mg  50 mg Oral DAILY    metFORMIN (GLUCOPHAGE) tablet 500 mg  500 mg Oral DAILY WITH BREAKFAST    hydroCHLOROthiazide (HYDRODIURIL) tablet 12.5 mg  12.5 mg Oral DAILY    [Held by provider] carvediloL (COREG) tablet 6.25 mg  6.25 mg Oral Q12H    timolol (TIMOPTIC) 0.25% ophthalmic solution  1 Drop Both Eyes BID    fluticasone propionate (FLONASE) 50 mcg/actuation nasal spray 2 Spray  2 Spray Both Nostrils DAILY    insulin lispro (HUMALOG) injection   SubCUTAneous AC&HS    glucose chewable tablet 16 g  4 Tablet Oral PRN    glucagon (GLUCAGEN) injection 1 mg  1 mg IntraMUSCular PRN    dextrose 10% infusion 0-250 mL  0-250 mL IntraVENous PRN    [Held by provider] docusate sodium (COLACE) capsule 100 mg  100 mg Oral BID    bisacodyL (DULCOLAX) tablet 10 mg  10 mg Oral Q48H PRN       Allergies: Allergies   Allergen Reactions    Penicillin G Hives       Lab/Data Review:  Recent Results (from the past 24 hour(s))   GLUCOSE, POC    Collection Time: 08/28/22 12:32 PM   Result Value Ref Range    Glucose (POC) 122 (H) 70 - 110 mg/dL   GLUCOSE, POC    Collection Time: 08/28/22  4:40 PM   Result Value Ref Range    Glucose (POC) 98 70 - 110 mg/dL   GLUCOSE, POC    Collection Time: 08/28/22  8:40 PM   Result Value Ref Range    Glucose (POC) 152 (H) 70 - 110 mg/dL   GLUCOSE, POC    Collection Time: 08/29/22  7:39 AM   Result Value Ref Range    Glucose (POC) 202 (H) 70 - 110 mg/dL       Assessment:     Primary Rehabilitation Diagnosis  1. Impaired Mobility and ADLs  2.  Acute Transverse Myelitis    Comorbidities  Patient Active Problem List   Diagnosis Code    History of uterine cancer Z85.42    Hydronephrosis N13.30    Idiopathic transverse myelitis ([facility] [address] G37.3       Plan: 1. Justification for continued stay: Good progression towards established rehabilitation goals. 2. Medical Issues being followed closely:    [x]  Fall and safety precautions     []  Wound Care     [x]  Bowel and Bladder Function     [x]  Fluid Electrolyte and Nutrition Balance     []  Pain Control      3. Issues that 24 hour rehabilitation nursing is following:    [x]  Fall and safety precautions     []  Wound Care     [x]  Bowel and Bladder Function     [x]  Fluid Electrolyte and Nutrition Balance     []  Pain Control      [x]  Assistance with and education on in-room safety with transfers to and from the bed, wheelchair, toilet and shower. 4. Acute rehabilitation plan of care:    [x]  Continue current care and rehab. [x]  Physical Therapy           [x]  Occupational Therapy           []  Speech Therapy     []  Hold Rehab until further notice     5. Medications:    [x]  MAR Reviewed     [x]  Continue Present Medications     6. Chemical DVT Prophylaxis:      []  Enoxaparin     []  Unfractionated Heparin     []  Warfarin     []  NOAC     []  Aspirin     [x]  None     7. Mechanical DVT Prophylaxis:      []  JACE Stockings     []  Sequential Compression Device     [x]  None     8. GI Prophylaxis:      []  PPI     []  H2 Blocker     [x]  None / Not indicated     9. Code status:    [x]  Full code     []  Partial code     []  Do not intubate     []  Do not resuscitate     10. Diet:  Specifications Adult Diet: Regular 4 carb choices (60 gm/meal)    Solids (consistency) Regular    Liquids (consistency) Regular    Fluid Restriction None      11. Orders:     Acute Transverse Myelitis  Idiopathic transverse myelitis-patient treated with high-dose IV steroids, MRI brain showed demyelination, suggestive of transverse myelitis, patient underwent an MRI of the thoracic as well as the lumbar spine, results attached, she also underwent a lumbar puncture which was noted to be negative.     Urinary Retention secondary to acute transverse myelitis   > Bladder Scans/Checks Q hours, If PVR > 250 Straight cath   > Flomax 0.4 mg PO    > Continue to monitor    Diabetes Mellitus Type 2    > Glucophage 500 mg daily with breakfast     Hypertension   > Coreg 6.25 mg - held due to hypotensive episode   > Losartan 50 mg    > HCTZ 12.5 mg     Knee pain/osteoarthritis  > Will apply Voltaren cream as needed    Total clinical care time is 25 minutes, including review of chart including all labs, radiology, past medical history, and discussion with patient and family. Greater than 50% of my time was spent in coordination of care and counseling. Disclaimer: Sections of this note are dictated using utilizing voice recognition software, which may have resulted in some phonetic based errors in grammar and contents. Even though attempts were made to correct all the mistakes, some may have been missed, and remained in the body of the document. If questions arise, please contact our department.     Signed:    Ken Crooks MD    August 29, 2022

## 2022-08-29 NOTE — ROUTINE PROCESS
SHIFT CHANGE NOTE FOR [facility]    Bedside and Verbal shift change report given to MISHA Mathis (oncoming nurse) by Jeff Haddad RN (offgoing nurse). Report included the following information SBAR, Kardex, MAR and Recent Results.     Situation:   Code Status: Full Code   Hospital Day: 6   Problem List:   Hospital Problems  Date Reviewed: 11/18/2019            Codes Class Noted POA    [facility] ICD-10-CM: G37.3  ICD-9-CM: 341.22  8/23/2022 Unknown           Background:   Past Medical History:   Past Medical History:   Diagnosis Date    Allergic rhinitis     Cancer ([facility] 1995    Uterine Cancer (radiation, surgery)    Diverticulosis     GERD (gastroesophageal reflux disease)     Hypercholesteremia     Hypertension     Steatosis of liver 2015        Assessment:  Changes in Assessment throughout shift: No change to previous assessment    Patient has Rdz Cath: yes Reasons if yes: urinary retention   Insertion date:8/26  Shift rdz care completed: YES    Last Vitals:     Vitals:    08/28/22 0727 08/28/22 1613 08/28/22 2015 08/29/22 0803   BP: 128/74 133/85 126/84 111/76   Pulse: 78 90 93 (!) 113   Resp: 16 18 18 16   Temp: 97 °F (36.1 °C) 97 °F (36.1 °C) 97 °F (36.1 °C) 97.5 °F (36.4 °C)   SpO2: 97% 97% 96% 96%   Weight:       Height:           PAIN    Pain Assessment    Pain Intensity 1: 0 (08/29/22 1600) Pain Intensity 1: 2 (12/29/14 1105)      Pain Location 1: Abdomen      Pain Intervention(s) 1: Medication (see MAR)  Patient Stated Pain Goal: 0 Patient Stated Pain Goal: 0  Intervention effective: yes  Other actions taken for pain:      Skin Assessment  Skin color Skin Color: Appropriate for ethnicity  Condition/Temperature Skin Condition/Temp: Dry  Integrity Skin Integrity: Excoriation (groin)  Turgor    Weekly Pressure Ulcer Documentation  Pressure  Injury Documentation: No Pressure Injury Noted-Pressure Ulcer Prevention Initiated  Wound Prevention & Protection Methods  Orientation of wound Orientation of Wound Prevention: Posterior  Location of Prevention Location of Wound Prevention: Buttocks, Sacrum/Coccyx  Dressing Present Dressing Present : No  Dressing Status    Wound Offloading Wound Offloading (Prevention Methods): Bed, pressure reduction mattress, Elevate heels, Pillows, Repositioning    Wound Prevention Checklist  Precautions: Fall    []  Heel prevention boots placed on patient    [x]  Patient turned q2h during shift    [x]  Hermilo Order Set ordered    []  Patient on Chata bed/Specialty bed    [x]  Each Wound is documented during shift (Stage, Color, drainage, odor, measurements, and dressings)    [x]  Dual skin checks done at bedside during shift report with MISHA Mathis     INTAKE/OUPUT  Date 08/28/22 0700 - 08/29/22 0659 08/29/22 0700 - 08/30/22 0659   Shift 7080-1863 6965-6983 24 Hour Total 5347-0838 3053-6597 24 Hour Total   INTAKE   P.O. 480  480 640  640     P. O. 480  480 640  640   Other    0  0     Irrigation Volume Input (mL) (Urinary Catheter 08/26/22 Rdz)    0  0   Shift Total(mL/kg) 480(5.4)  480(5.4) 640(7.2)  640(7.2)   OUTPUT   Urine(mL/kg/hr) 1960(1.8) 2000(1.9) 3960(1.9) 1560  1560     Urine Voided  500 500 480  480     Urine Occurrence(s)  1 x 1 x 2 x  2 x     Urine Output (mL) (Urinary Catheter 08/26/22 Rdz) 1960 1500 3460 1080  1080   Emesis/NG output           Emesis Occurrence(s) 0 x 0 x 0 x      Stool           Stool Occurrence(s) 0 x 0 x 0 x 0 x  0 x   Shift Total(mL/kg) 9061(45.0) 2907(24.7) 5918(11.8) 4394(34.2)  1560(17.6)   NET -1480 -2000 -3480 -920  -920   Weight (kg) 88.5 88.5 88.5 88.5 88.5 88.5       Recommendations:  Patient needs and requests: toileting and rdz care     Pending tests/procedures: BMP on 8/30/22    Functional Level/Equipment: Partial (one person) /      Fall Precautions:   Fall risk precautions were reinforced with the patient; she was instructed to call for help prior to getting up.  The following fall risk precautions were continued: bed/ chair alarms, door signage, yellow bracelet and socks as well as update of the Mo Callawayse tool in the patient's room. Jose J Score: 4    HEALS Safety Check    A safety check occurred in the patient's room between off going nurse and oncoming nurse listed above. The safety check included the below items  Area Items   H  High Alert Medications Verify all high alert medication drips (heparin, PCA, etc.)   E  Equipment Suction is set up for ALL patients (with ryan)  Red plugs utilized for all equipment (IV pumps, etc.)  WOWs wiped down at end of shift. Room stocked with oxygen, suction, and other unit-specific supplies   A  Alarms Bed alarm is set for fall risk patients  Ensure chair alarm is in place and activated if patient is up in a chair   L  Lines Check IV for any infiltration  Valle bag is empty if patient has a Valle   Tubing and IV bags are labeled   S  Safety   Room is clean, patient is clean, and equipment is clean. Hallways are clear from equipment besides carts. Fall bracelet on for fall risk patients  Ensure room is clear and free of clutter  Suction is set up for ALL patients (with ryan)  Hallways are clear from equipment besides carts.    Isolation precautions followed, supplies available outside room, sign posted     Mervin Novoa RN

## 2022-08-29 NOTE — PROGRESS NOTES
Problem: Mobility Impaired (Adult and Pediatric)  Goal: *Acute Goals and Plan of Care (Insert Text)  Description: Physical Therapy Short Term Goals  Initiated 2022 and to be accomplished within 7 day(s) (2022)  1. Patient will move from supine to sit and sit to supine  in bed with modified independence. 2.  Patient will transfer from bed to chair and chair to bed with supervision/set-up using the least restrictive device. 3.  Patient will perform sit to stand with maximal assistance. 4.  Patient will propel w/c over level surfaces greater than or equal to 150 feet with modified independence. Physical Therapy Long Term Goals  Initiated 2022 and to be accomplished within 21 day(s) (2022)  1. Patient will move from supine to sit and sit to supine , scoot up and down, and roll side to side in bed with independence. 2.  Patient will transfer from bed to chair and chair to bed with modified independence using the least restrictive device. 3.  Patient will perform sit to stand with minimal assistance/contact guard assist.  4.  Patient will ambulate with moderate assistance  for 25 feet with the least restrictive device. 5.  Patient will ascend/descend 4 stairs with 1 handrail(s) with moderate assistance. 6.  Patient will propel w/c over level surfaces, surfaces of varying grades, thresholds and ramps with modified independence. Outcome: Progressing Towards Goal   PHYSICAL THERAPY TREATMENT    Patient: Romel Edmondson (28 y.o. female)  Date: 2022  Diagnosis: [facility] [G37.3]   Precautions: Fall  Chart, physical therapy assessment, plan of care and goals were reviewed. Time In:1400  Time Out:1430    Patient seen for: Therapeutic exercise;Transfer training; Wheelchair mobility    Pain:  Pt pain was reported as no c/o pre-treatment. Pt pain was reported as no c/o post-treatment.   Intervention: NA     Patient identified with name and : yes SUBJECTIVE:      Pt reports not being very tired but muscles feel fatigued. OBJECTIVE DATA SUMMARY:    Objective:     BED/MAT MOBILITY Daily Assessment     Sit to Supine : 6 (Modified independent)  Pt assisted back to bed after PT and performed sit to supine Dian. TRANSFERS Daily Assessment     Transfer Type: Lateral pivot  Transfer Assistance : 4 (Contact guard assistance)  Pt performed squat pivot txfr w/c to bed to left with CGA. BALANCE Daily Assessment     Sitting - Static: Good (unsupported)  Sitting - Dynamic: Good (unsupported)  Standing - Static: Fair  Standing - Dynamic : Impaired        WHEELCHAIR MOBILITY Daily Assessment     Able to Propel (ft): 186 feet  Functional Level:  (Dian)  Curbs/Ramps Assist Required (FIM Score): 0 (Not tested)  Wheelchair Setup Assist Required : 3 (Moderate assistance)  Wheelchair Management: Manages left brake;Manages right brake  Pt propelled w/c back to room at end of session with BLE Dian        THERAPEUTIC EXERCISES Daily Assessment       Seated BLE LAQ, hip flexion, APs, hip add with ball and abd with red tband 2x15 with 1.5#, hip abd/add with SLR 2x10(began as a group but other pt left group)        ASSESSMENT:  Pt tolerated all exercises well and wanted to propel self back to room without assist.   Progression toward goals:  []      Improving appropriately and progressing toward goals  [x]      Improving slowly and progressing toward goals  []      Not making progress toward goals and plan of care will be adjusted      PLAN:  Patient continues to benefit from skilled intervention to address the above impairments. Continue treatment per established plan of care. Discharge Recommendations:  Home Health  Further Equipment Recommendations for Discharge:  not assessed      Estimated Discharge Date:9/3/2022    Activity Tolerance:   Fair+  Please refer to the flowsheet for vital signs taken during this treatment.     After treatment:   [] Patient left in no apparent distress in bed  [] Patient left in no apparent distress sitting up in chair  [x] Patient left in no apparent distress in w/c mobilizing under own power  [] Patient left in no apparent distress dining area  [] Patient left in no apparent distress mobilizing under own power  [] Call bell left within reach  [] Nursing notified  [] Caregiver present  [] Bed alarm activated   [x] Chair alarm activated      Thuy Hill PTA  8/29/2022

## 2022-08-29 NOTE — DIABETES MGMT
Diabetes/ Glycemic Control Plan of Care  Recommendations:   Blood glucose elevated this am 202 mg/dl  This has been the only blood glucose >180 mg/dl in the past 3 days. Recommend continuing corrective insulin coverage as needed. Will continue inpatient monitoring. Assessment:   DX:   1. Idiopathic transverse myelitis ([facility] [G37.3 (ICD-10-CM)]           Fasting/ Morning blood glucose:   Lab Results   Component Value Date/Time    Glucose 146 (H) 08/24/2022 06:08 AM    Glucose (POC) 202 (H) 08/29/2022 07:39 AM     IV Fluids containing dextrose: none   Steroids:  none     Blood glucose values:        Latest Reference Range & Units 8/28/22 08:49 8/28/22 12:32 8/28/22 16:40 8/28/22 20:40 8/29/22 07:39   GLUCOSE,FAST - POC 70 - 110 mg/dL 137 (H) 122 (H) 98 152 (H) 202 (H)   (H): Data is abnormally high  Within target range (70-180mg/dL):  progressing. Only 1 BG out of range in the past 36 hours  Current insulin orders:   Lispro corrective insulin coverage AC&HS as needed. Metformin 500 mg daily with breakfast.  Total Daily Dose previous 24 hours = 2 units   Current A1c:   Lab Results   Component Value Date/Time    Hemoglobin A1c 6.7 (H) 08/24/2022 06:08 AM      Equivalent  to an average Blood Glucose of 146 mg/dl for 2-3 months prior to admission  Adequate glycemic control PTA: YES (new diagnosis)  Nutrition/Diet:   Active Orders   Diet    ADULT DIET Regular; 4 carb choices (60 gm/meal); Low Fat/Low Chol/High Fiber/2 gm Na      Meal Intake:  Patient Vitals for the past 168 hrs:   % Diet Eaten   08/28/22 1800 76 - 100%   08/28/22 1317 76 - 100%   08/27/22 1800 76 - 100%   08/27/22 1300 51 - 75%   08/27/22 0919 51 - 75%   08/26/22 1819 51 - 75%   08/26/22 1304 76 - 100%   08/26/22 0925 51 - 75%   08/25/22 1300 76 - 100%   08/25/22 1035 76 - 100%   08/24/22 1800 76 - 100%   08/24/22 1400 26 - 50%   08/24/22 0852 51 - 75%     Supplement Intake:  No data found.     Home diabetes medications:   Key Antihyperglycemic Medications       Patient is on no antihyperglycemic meds. Plan/Goals:   Blood glucose will be within target of 70 - 180 mg/dl within 72 hours  Reinforce dietary and medication compliance at home.        Education:  [x] Refer to Diabetes Education Record (8/26/2022)                       [] Education not indicated at this time     Neva Fan, [facility] CDE  Ext [phone]

## 2022-08-29 NOTE — PROGRESS NOTES
Problem: Falls - Risk of  Goal: *Absence of Falls  Description: Document Jaydon Romero Fall Risk and appropriate interventions in the flowsheet. Outcome: Progressing Towards Goal  Note: Fall Risk Interventions:  Mobility Interventions: Bed/chair exit alarm, Communicate number of staff needed for ambulation/transfer, OT consult for ADLs, Patient to call before getting OOB, Utilize walker, cane, or other assistive device         Medication Interventions: Assess postural VS orthostatic hypotension, Bed/chair exit alarm, Patient to call before getting OOB, Teach patient to arise slowly, Utilize gait belt for transfers/ambulation    Elimination Interventions: Bed/chair exit alarm, Call light in reach, Elevated toilet seat, Patient to call for help with toileting needs, Toilet paper/wipes in reach, Toileting schedule/hourly rounds    History of Falls Interventions: Bed/chair exit alarm, Consult care management for discharge planning, Door open when patient unattended, Vital signs minimum Q4HRs X 24 hrs (comment for end date), Utilize gait belt for transfer/ambulation         Problem: Patient Education: Go to Patient Education Activity  Goal: Patient/Family Education  Outcome: Progressing Towards Goal     Problem: Pain  Goal: *Control of Pain  Outcome: Progressing Towards Goal     Problem: Patient Education: Go to Patient Education Activity  Goal: Patient/Family Education  Outcome: Progressing Towards Goal     Problem: Diabetes Self-Management  Goal: *Disease process and treatment process  Description: Define diabetes and identify own type of diabetes; list 3 options for treating diabetes. Outcome: Progressing Towards Goal  Goal: *Incorporating nutritional management into lifestyle  Description: Describe effect of type, amount and timing of food on blood glucose; list 3 methods for planning meals.   Outcome: Progressing Towards Goal  Goal: *Incorporating physical activity into lifestyle  Description: State effect of exercise on blood glucose levels. Outcome: Progressing Towards Goal  Goal: *Developing strategies to promote health/change behavior  Description: Define the ABC's of diabetes; identify appropriate screenings, schedule and personal plan for screenings. Outcome: Progressing Towards Goal  Goal: *Using medications safely  Description: State effect of diabetes medications on diabetes; name diabetes medication taking, action and side effects. Outcome: Progressing Towards Goal  Goal: *Monitoring blood glucose, interpreting and using results  Description: Identify recommended blood glucose targets  and personal targets. Outcome: Progressing Towards Goal  Goal: *Prevention, detection, treatment of acute complications  Description: List symptoms of hyper- and hypoglycemia; describe how to treat low blood sugar and actions for lowering  high blood glucose level. Outcome: Progressing Towards Goal  Goal: *Prevention, detection and treatment of chronic complications  Description: Define the natural course of diabetes and describe the relationship of blood glucose levels to long term complications of diabetes. Outcome: Progressing Towards Goal  Goal: *Developing strategies to address psychosocial issues  Description: Describe feelings about living with diabetes; identify support needed and support network  Outcome: Progressing Towards Goal  Goal: *Insulin pump training  Outcome: Progressing Towards Goal  Goal: *Sick day guidelines  Outcome: Progressing Towards Goal  Goal: *Patient Specific Goal (EDIT GOAL, INSERT TEXT)  Outcome: Progressing Towards Goal     Problem: Patient Education: Go to Patient Education Activity  Goal: Patient/Family Education  Outcome: Progressing Towards Goal     Problem: Pressure Injury - Risk of  Goal: *Prevention of pressure injury  Description: Document Hermilo Scale and appropriate interventions in the flowsheet.   Outcome: Progressing Towards Goal  Note: Pressure Injury Interventions:  Sensory Interventions: Assess changes in LOC, Assess need for specialty bed, Avoid rigorous massage over bony prominences, Chair cushion, Check visual cues for pain, Keep linens dry and wrinkle-free, Minimize linen layers, Sit a 90-degree angle/use footstool if needed, Pressure redistribution bed/mattress (bed type), Turn and reposition approx. every two hours (pillows and wedges if needed), Use 30-degree side-lying position    Moisture Interventions: Absorbent underpads, Apply protective barrier, creams and emollients, Check for incontinence Q2 hours and as needed, Offer toileting Q_hr, Minimize layers    Activity Interventions: Assess need for specialty bed, Increase time out of bed, Pressure redistribution bed/mattress(bed type)    Mobility Interventions: Assess need for specialty bed, Chair cushion, Float heels, HOB 30 degrees or less, Pressure redistribution bed/mattress (bed type), PT/OT evaluation, Turn and reposition approx.  every two hours(pillow and wedges)    Nutrition Interventions: Document food/fluid/supplement intake, Offer support with meals,snacks and hydration    Friction and Shear Interventions: Apply protective barrier, creams and emollients, HOB 30 degrees or less, Sit at 90-degree angle, Transferring/repositioning devices                Problem: Patient Education: Go to Patient Education Activity  Goal: Patient/Family Education  Outcome: Progressing Towards Goal     Problem: Patient Education: Go to Patient Education Activity  Goal: Patient/Family Education  Outcome: Progressing Towards Goal     Problem: Inpatient Rehab (Adult)  Goal: *LTG: Avoids injury/falls 100% of time related to deficits  Outcome: Progressing Towards Goal  Goal: *LTG: Avoids infection 100% of time related to deficits  Outcome: Progressing Towards Goal  Goal: *LTG: Absence of DVT during hospitalization  Outcome: Progressing Towards Goal  Goal: *LTG: Maintains Skin Integrity With No Evidence of Pressure Injury 100% of Time  Outcome: Progressing Towards Goal  Goal: Interventions  Outcome: Progressing Towards Goal     Problem: Patient Education: Go to Patient Education Activity  Goal: Patient/Family Education  Outcome: Progressing Towards Goal

## 2022-08-29 NOTE — PROGRESS NOTES
SHIFT CHANGE NOTE FOR [facility]    Bedside and Verbal shift change report given to ADARSH DEGROOT  (oncoming nurse) by Elton Santos RN (offgoing nurse). Report included the following information SBAR, Kardex, MAR and Recent Results.     Situation:   Code Status: Full Code   Hospital Day: 6   Problem List:   Hospital Problems  Date Reviewed: 11/18/2019            Codes Class Noted POA    Idiopathic transverse myelitis ([facility] ICD-10-CM: G37.3  ICD-9-CM: 341.22  8/23/2022 Unknown         Background:   Past Medical History:   Past Medical History:   Diagnosis Date    Allergic rhinitis     Cancer ([facility] 1995    Uterine Cancer (radiation, surgery)    Diverticulosis     GERD (gastroesophageal reflux disease)     Hypercholesteremia     Hypertension     Steatosis of liver 2015        Assessment:  Changes in Assessment throughout shift: No change to previous assessment    Patient has Rdz Cath: yes Reasons if yes: acute urinary retention   Insertion date:08/26/22  Shift rdz care completed: YES    Last Vitals:     Vitals:    08/27/22 1600 08/28/22 0727 08/28/22 1613 08/28/22 2015   BP: 109/73 128/74 133/85 126/84   Pulse: 83 78 90 93   Resp: 16 16 18 18   Temp: 97.7 °F (36.5 °C) 97 °F (36.1 °C) 97 °F (36.1 °C) 97 °F (36.1 °C)   SpO2:  97% 97% 96%   Weight:       Height:           PAIN    Pain Assessment    Pain Intensity 1: 0 (08/29/22 0449) Pain Intensity 1: 2 (12/29/14 1105)      Pain Location 1: Abdomen      Pain Intervention(s) 1: Medication (see MAR)  Patient Stated Pain Goal: 0 Patient Stated Pain Goal: 0  Intervention effective: yes  Other actions taken for pain:      Skin Assessment  Skin color Skin Color: Appropriate for ethnicity  Condition/Temperature Skin Condition/Temp: Dry  Integrity Skin Integrity: Excoriation (groin)  Turgor    Weekly Pressure Ulcer Documentation  Pressure  Injury Documentation: No Pressure Injury Noted-Pressure Ulcer Prevention Initiated  Wound Prevention & Protection Methods  Orientation of wound Orientation of Wound Prevention: Posterior  Location of Prevention Location of Wound Prevention: Buttocks, Sacrum/Coccyx  Dressing Present Dressing Present : No  Dressing Status    Wound Offloading Wound Offloading (Prevention Methods): Bed, pressure reduction mattress, Elevate heels, Pillows, Repositioning    Wound Prevention Checklist  Precautions: Fall    []  Heel prevention boots placed on patient    []  Patient turned q2h during shift    []  Hermilo Order Set ordered    []  Patient on Chata bed/Specialty bed    []  Each Wound is documented during shift (Stage, Color, drainage, odor, measurements, and dressings)    [x]  Dual skin checks done at bedside during shift report with Monique Holly RN     INTAKE/OUPUT  Date 08/28/22 0700 - 08/29/22 0659 08/29/22 0700 - 08/30/22 0659   Shift 8285-6292 0970-5536 24 Hour Total 9361-5811 8591-3194 24 Hour Total   INTAKE   P.O. 480  480        P. O. 480  480      Shift Total(mL/kg) 480(5.4)  480(5.4)      OUTPUT   Urine(mL/kg/hr) 1960(1.8) 1700 3660        Urine Voided  500 500        Urine Occurrence(s)  0 x 0 x        Urine Output (mL) (Urinary Catheter 08/26/22 Valle) 1960 1200 3160      Emesis/NG output           Emesis Occurrence(s) 0 x 0 x 0 x      Stool           Stool Occurrence(s) 0 x 0 x 0 x      Shift Total(mL/kg) 1710(48.4) 1700(19.2) 5811(37.3)      NET -1480 -1700 -3180      Weight (kg) 88.5 88.5 88.5 88.5 88.5 88.5         Recommendations:  Patient needs and requests: toileting help    Pending tests/procedures: routine labs     Functional Level/Equipment: Partial (one person) / Bed (comment); Ida Budd; Wheelchair    Fall Precautions:   Fall risk precautions were reinforced with the patient; she was instructed to call for help prior to getting up. The following fall risk precautions were continued: bed/ chair alarms, door signage, yellow bracelet and socks as well as update of the Jaydon Heather tool in the patient's room.    Jose J Score: 4    HEALS Safety Check    A safety check occurred in the patient's room between off going nurse and oncoming nurse listed above. The safety check included the below items  Area Items   H  High Alert Medications Verify all high alert medication drips (heparin, PCA, etc.)   E  Equipment Suction is set up for ALL patients (with ryan)  Red plugs utilized for all equipment (IV pumps, etc.)  WOWs wiped down at end of shift. Room stocked with oxygen, suction, and other unit-specific supplies   A  Alarms Bed alarm is set for fall risk patients  Ensure chair alarm is in place and activated if patient is up in a chair   L  Lines Check IV for any infiltration  Valle bag is empty if patient has a Valle   Tubing and IV bags are labeled   S  Safety   Room is clean, patient is clean, and equipment is clean. Hallways are clear from equipment besides carts. Fall bracelet on for fall risk patients  Ensure room is clear and free of clutter  Suction is set up for ALL patients (with ryan)  Hallways are clear from equipment besides carts.    Isolation precautions followed, supplies available outside room, sign posted     Elton Santos RN  BSN

## 2022-08-30 LAB
ANION GAP SERPL CALC-SCNC: 7 MMOL/L (ref 3–18)
APPEARANCE UR: CLEAR
BACTERIA URNS QL MICRO: ABNORMAL /HPF
BILIRUB UR QL: NEGATIVE
BUN SERPL-MCNC: 14 MG/DL (ref 7–18)
BUN/CREAT SERPL: 26 (ref 12–20)
CALCIUM SERPL-MCNC: 9 MG/DL (ref 8.5–10.1)
CHLORIDE SERPL-SCNC: 104 MMOL/L (ref 100–111)
CO2 SERPL-SCNC: 26 MMOL/L (ref 21–32)
COLOR UR: YELLOW
CREAT SERPL-MCNC: 0.53 MG/DL (ref 0.6–1.3)
EPITH CASTS URNS QL MICRO: ABNORMAL /LPF (ref 0–5)
GLUCOSE BLD STRIP.AUTO-MCNC: 110 MG/DL (ref 70–110)
GLUCOSE BLD STRIP.AUTO-MCNC: 118 MG/DL (ref 70–110)
GLUCOSE BLD STRIP.AUTO-MCNC: 157 MG/DL (ref 70–110)
GLUCOSE BLD STRIP.AUTO-MCNC: 178 MG/DL (ref 70–110)
GLUCOSE SERPL-MCNC: 122 MG/DL (ref 74–99)
GLUCOSE UR STRIP.AUTO-MCNC: NEGATIVE MG/DL
HGB UR QL STRIP: ABNORMAL
KETONES UR QL STRIP.AUTO: NEGATIVE MG/DL
LEUKOCYTE ESTERASE UR QL STRIP.AUTO: ABNORMAL
NITRITE UR QL STRIP.AUTO: POSITIVE
PH UR STRIP: 7 [PH] (ref 5–8)
POTASSIUM SERPL-SCNC: 3.5 MMOL/L (ref 3.5–5.5)
PROT UR STRIP-MCNC: ABNORMAL MG/DL
RBC #/AREA URNS HPF: ABNORMAL /HPF (ref 0–5)
SODIUM SERPL-SCNC: 137 MMOL/L (ref 136–145)
SP GR UR REFRACTOMETRY: 1 (ref 1–1.03)
UROBILINOGEN UR QL STRIP.AUTO: 0.2 EU/DL (ref 0.2–1)
WBC URNS QL MICRO: ABNORMAL /HPF (ref 0–4)

## 2022-08-30 PROCEDURE — 65310000000 HC RM PRIVATE REHAB

## 2022-08-30 PROCEDURE — 97110 THERAPEUTIC EXERCISES: CPT

## 2022-08-30 PROCEDURE — 74011250637 HC RX REV CODE- 250/637: Performed by: HOSPITALIST

## 2022-08-30 PROCEDURE — 97116 GAIT TRAINING THERAPY: CPT

## 2022-08-30 PROCEDURE — 82962 GLUCOSE BLOOD TEST: CPT

## 2022-08-30 PROCEDURE — 80048 BASIC METABOLIC PNL TOTAL CA: CPT

## 2022-08-30 PROCEDURE — 97535 SELF CARE MNGMENT TRAINING: CPT

## 2022-08-30 PROCEDURE — 2709999900 HC NON-CHARGEABLE SUPPLY

## 2022-08-30 PROCEDURE — 74011636637 HC RX REV CODE- 636/637: Performed by: EMERGENCY MEDICINE

## 2022-08-30 PROCEDURE — 99232 SBSQ HOSP IP/OBS MODERATE 35: CPT | Performed by: INTERNAL MEDICINE

## 2022-08-30 PROCEDURE — 97530 THERAPEUTIC ACTIVITIES: CPT

## 2022-08-30 PROCEDURE — 36415 COLL VENOUS BLD VENIPUNCTURE: CPT

## 2022-08-30 PROCEDURE — 81001 URINALYSIS AUTO W/SCOPE: CPT

## 2022-08-30 PROCEDURE — 74011250637 HC RX REV CODE- 250/637: Performed by: EMERGENCY MEDICINE

## 2022-08-30 RX ADMIN — LOSARTAN POTASSIUM 50 MG: 50 TABLET, FILM COATED ORAL at 08:44

## 2022-08-30 RX ADMIN — TIMOLOL MALEATE 1 DROP: 2.5 SOLUTION OPHTHALMIC at 08:45

## 2022-08-30 RX ADMIN — TIMOLOL MALEATE 1 DROP: 2.5 SOLUTION OPHTHALMIC at 17:17

## 2022-08-30 RX ADMIN — Medication 2 UNITS: at 08:45

## 2022-08-30 RX ADMIN — Medication 2 UNITS: at 21:03

## 2022-08-30 RX ADMIN — Medication 1 TABLET: at 08:44

## 2022-08-30 RX ADMIN — Medication 1 TABLET: at 17:17

## 2022-08-30 RX ADMIN — ATORVASTATIN CALCIUM 40 MG: 40 TABLET, FILM COATED ORAL at 21:04

## 2022-08-30 RX ADMIN — METFORMIN HYDROCHLORIDE 500 MG: 500 TABLET ORAL at 08:45

## 2022-08-30 RX ADMIN — HYDROCHLOROTHIAZIDE 12.5 MG: 25 TABLET ORAL at 08:44

## 2022-08-30 RX ADMIN — TAMSULOSIN HYDROCHLORIDE 0.4 MG: 0.4 CAPSULE ORAL at 08:45

## 2022-08-30 NOTE — PROGRESS NOTES
[address]  [address] [place] [address], [address]     INPATIENT REHABILITATION  DAILY PROGRESS NOTE     Date: 8/30/2022    Name: Jeffrey Dickson Age / Sex: 78 y.o. / female   CSN: [id number] MRN: [id number]   Admit Date: 8/23/2022 Length of Stay: 7 days     Primary Rehabilitation Diagnosis: Impaired Mobility and ADLs secondary to Acute Transverse Myelitis       Subjective:     Patient is resting in bed comfortably. No new issues overnight. No back pain. No tingling or numbness. Denies any dysuria currently. No abdominal pain. No shortness of breath or cough. Continues to have Valle catheter. Pain Control: no current joint or muscle symptoms, essentially pain-free      Objective:     Vital Signs:  Patient Vitals for the past 24 hrs:   BP Temp Pulse Resp SpO2   08/30/22 0913 127/75 97.5 °F (36.4 °C) 100 16 98 %   08/30/22 0839 137/82 97.5 °F (36.4 °C) (!) 113 16 96 %   08/29/22 2133 127/86 97.4 °F (36.3 °C) 96 18 95 %   08/29/22 1709 (!) 147/89 97.5 °F (36.4 °C) 93 16 98 %        Physical Examination:  GENERAL SURVEY: Patient is awake, alert, oriented x 3,  not in acute respiratory distress. CHEST/LUNGS: symmetrical chest expansion, good air entry, clear breath sounds  HEART: adynamic precordium, good S1 S2, no S3, regular rhythm, no murmurs  ABDOMEN: flat, bowel sounds appreciated, soft, non-tender, Valle catheter in situ. EXTREMITIES: No pedal edema. No erythema or edema of both knees. NEUROLOGICAL EXAM: Awake, no facial droop, follows verbal commands appropriately, motor strength 4-5 out of 5 in both lower extremities and 5-5 in both upper extremities, sensation touch normal currently in both upper extremities, no tremors noted.     Functional Progress:    PHYSICAL THERAPY    ON ADMISSION MOST RECENT   Wheelchair Mobility/Management  Able to Propel (ft): 186 feet  Functional Level: 4  Curbs/Ramps Assist Required (FIM Score): 0 (Not tested)  Wheelchair Setup Assist Required : 3 (Moderate assistance)  Wheelchair Management: Manages left brake, Manages right brake Wheelchair Mobility/Management  Able to Propel (ft): 186 feet  Functional Level: 6  Curbs/Ramps Assist Required (FIM Score): 0 (Not tested)  Wheelchair Setup Assist Required : 5 (Stand-by assistance)  Wheelchair Management: Manages left brake, Manages right brake     Gait  Amount of Assistance:  (Unable at this time)  Distance (ft): 0 Feet (ft)  Assistive Device: Gait belt, Walker, rolling Gait  Amount of Assistance: 4 (Minimal assistance)  Distance (ft): 22 Feet (ft) (22 ft, then 29 ft with seated rests in between bouts)  Assistive Device: Gait belt, Walker, rolling     Balance-Sitting/Standing  Sitting - Static: Good (unsupported)  Sitting - Dynamic: Fair (occasional)  Standing - Static:  (unable to achieve full stand at this time)  Standing - Dynamic : Impaired Balance-Sitting/Standing  Sitting - Static: Good (unsupported)  Sitting - Dynamic: Good (unsupported)  Standing - Static: Fair  Standing - Dynamic : Impaired     Bed/Mat Mobility  Rolling Right : 6 (Modified independent)  Rolling Left : 6 (Modified independent)  Supine to Sit : 5 (Supervision)  Sit to Supine : 5 (Supervision) Bed/Mat Mobility  Rolling Right : 6 (Modified independent)  Rolling Left : 6 (Modified independent)  Supine to Sit : 6 (Modified independent)  Sit to Supine : 6 (Modified independent)     Transfers  Transfer Type: Other  Other: squat pivot  Transfer Assistance : 1 (Total assistance) (unable to achieve stand for stand step,, was able to perform lateral transfer with minimal physical assistance and maximal verbal cuing utilizing squat pivot technique with multiple attempts at clearing sacrum as pt does not maintain sacral clearance.)  Sit to Stand Assistance: Unable at this time Transfers  Transfer Type:  Other (stand step with RW)  Other: stand step with RW  Transfer Assistance : 4 (Minimal assistance)  Sit to Stand Assistance: Minimal assistance (CG/min A)     Steps or Stairs  Steps/Stairs Ambulated (#): 0  Level of Assist :  (Unable at this time) Steps or Stairs  Steps/Stairs Ambulated (#): 0  Level of Assist :  (Unable at this time)         Current Medications:  Current Facility-Administered Medications   Medication Dose Route Frequency    diclofenac (VOLTAREN) 1 % topical gel 2 g  2 g Topical QID PRN    tamsulosin (FLOMAX) capsule 0.4 mg  0.4 mg Oral DAILY    pneumococcal 23-valent (PNEUMOVAX 23) injection 0.5 mL  0.5 mL IntraMUSCular PRIOR TO DISCHARGE    atorvastatin (LIPITOR) tablet 40 mg  40 mg Oral QHS    calcium-vitamin D 600 mg-5 mcg (200 unit) per tablet 1 Tablet  1 Tablet Oral BID    cetirizine (ZYRTEC) tablet 5 mg  5 mg Oral DAILY PRN    losartan (COZAAR) tablet 50 mg  50 mg Oral DAILY    metFORMIN (GLUCOPHAGE) tablet 500 mg  500 mg Oral DAILY WITH BREAKFAST    hydroCHLOROthiazide (HYDRODIURIL) tablet 12.5 mg  12.5 mg Oral DAILY    [Held by provider] carvediloL (COREG) tablet 6.25 mg  6.25 mg Oral Q12H    timolol (TIMOPTIC) 0.25% ophthalmic solution  1 Drop Both Eyes BID    fluticasone propionate (FLONASE) 50 mcg/actuation nasal spray 2 Spray  2 Spray Both Nostrils DAILY    insulin lispro (HUMALOG) injection   SubCUTAneous AC&HS    glucose chewable tablet 16 g  4 Tablet Oral PRN    glucagon (GLUCAGEN) injection 1 mg  1 mg IntraMUSCular PRN    dextrose 10% infusion 0-250 mL  0-250 mL IntraVENous PRN    [Held by provider] docusate sodium (COLACE) capsule 100 mg  100 mg Oral BID    bisacodyL (DULCOLAX) tablet 10 mg  10 mg Oral Q48H PRN       Allergies:   Allergies   Allergen Reactions    Penicillin G Hives       Lab/Data Review:  Recent Results (from the past 24 hour(s))   GLUCOSE, POC    Collection Time: 08/29/22  5:06 PM   Result Value Ref Range    Glucose (POC) 88 70 - 110 mg/dL   GLUCOSE, POC    Collection Time: 08/29/22  9:02 PM   Result Value Ref Range    Glucose (POC) 121 (H) 70 - 218 mg/dL   METABOLIC PANEL, BASIC    Collection Time: 08/30/22  5:29 AM   Result Value Ref Range    Sodium 137 136 - 145 mmol/L    Potassium 3.5 3.5 - 5.5 mmol/L    Chloride 104 100 - 111 mmol/L    CO2 26 21 - 32 mmol/L    Anion gap 7 3.0 - 18 mmol/L    Glucose 122 (H) 74 - 99 mg/dL    BUN 14 7.0 - 18 MG/DL    Creatinine 0.53 (L) 0.6 - 1.3 MG/DL    BUN/Creatinine ratio 26 (H) 12 - 20      GFR est AA >60 >60 ml/min/1.73m2    GFR est non-AA >60 >60 ml/min/1.73m2    Calcium 9.0 8.5 - 10.1 MG/DL   GLUCOSE, POC    Collection Time: 08/30/22  7:42 AM   Result Value Ref Range    Glucose (POC) 178 (H) 70 - 110 mg/dL   GLUCOSE, POC    Collection Time: 08/30/22 12:00 PM   Result Value Ref Range    Glucose (POC) 110 70 - 110 mg/dL       Assessment:     Primary Rehabilitation Diagnosis  1. Impaired Mobility and ADLs  2. Acute Transverse Myelitis    Comorbidities  Patient Active Problem List   Diagnosis Code    History of uterine cancer Z85.42    Hydronephrosis N13.30    Idiopathic transverse myelitis ([facility] [address] G37.3       Plan:     1. Justification for continued stay: Good progression towards established rehabilitation goals. 2. Medical Issues being followed closely:    [x]  Fall and safety precautions     []  Wound Care     [x]  Bowel and Bladder Function     [x]  Fluid Electrolyte and Nutrition Balance     []  Pain Control      3. Issues that 24 hour rehabilitation nursing is following:    [x]  Fall and safety precautions     []  Wound Care     [x]  Bowel and Bladder Function     [x]  Fluid Electrolyte and Nutrition Balance     []  Pain Control      [x]  Assistance with and education on in-room safety with transfers to and from the bed, wheelchair, toilet and shower. 4. Acute rehabilitation plan of care:    [x]  Continue current care and rehab. [x]  Physical Therapy           [x]  Occupational Therapy           []  Speech Therapy     []  Hold Rehab until further notice     5. Medications:    [x]  MAR Reviewed     [x]  Continue Present Medications     6. Chemical DVT Prophylaxis:      []  Enoxaparin     []  Unfractionated Heparin     []  Warfarin     []  NOAC     []  Aspirin     [x]  None     7. Mechanical DVT Prophylaxis:      []  JACE Stockings     []  Sequential Compression Device     [x]  None     8. GI Prophylaxis:      []  PPI     []  H2 Blocker     [x]  None / Not indicated     9. Code status:    [x]  Full code     []  Partial code     []  Do not intubate     []  Do not resuscitate     10. Diet:  Specifications Adult Diet: Regular 4 carb choices (60 gm/meal)    Solids (consistency) Regular    Liquids (consistency) Regular    Fluid Restriction None      11. Orders:     Acute Transverse Myelitis  Idiopathic transverse myelitis-patient treated with high-dose IV steroids, MRI brain showed demyelination, suggestive of transverse myelitis, patient underwent an MRI of the thoracic as well as the lumbar spine, results attached, she also underwent a lumbar puncture which was noted to be negative. Urinary Retention secondary to acute transverse myelitis   > Bladder Scans/Checks Q hours, If PVR > 250 Straight cath   > Flomax 0.4 mg PO   > 8/30: Urology has been consulted.   > Continue to monitor    Diabetes Mellitus Type 2    > Glucophage 500 mg daily with breakfast     Hypertension   > Coreg 6.25 mg - held due to hypotensive episode   > Losartan 50 mg    > HCTZ 12.5 mg     Knee pain/osteoarthritis  > Continue Voltaren cream as needed    Total clinical care time is 25 minutes, including review of chart including all labs, radiology, past medical history, and discussion with patient and family. Greater than 50% of my time was spent in coordination of care and counseling. Disclaimer: Sections of this note are dictated using utilizing voice recognition software, which may have resulted in some phonetic based errors in grammar and contents. Even though attempts were made to correct all the mistakes, some may have been missed, and remained in the body of the document. If questions arise, please contact our department.     Signed:    Michael Chávez MD    August 30, 2022

## 2022-08-30 NOTE — PROGRESS NOTES
[facility]     Patient ___Kristin Pham_____________ was evaluated on ___8/30/2022___ for a manual wheelchair for home use. Patient has a diagnosis(s) of: _____Impaired Mobility and ADLs secondary to Acute Transverse Myelitis ______ that causes mobility limitations in the home that significantly impairs the ability to participate in mobility -related activities of daily living (MRADLs) like-   Please check all that apply:  _x_ Toileting _x_Bathing _x_ Grooming _x_ Dressing __ Feeding     Patient has the following mobility condition/limitation that - Please check one:  __ Prevents the beneficiary from accomplishing MRADL entirely  __ Places the beneficiary at reasonably determined heightened risk of morbidity or mortality secondary to the attempts to perform MRADL  _x_ Prevents the beneficiary from completing an MRADL within a reasonable time frame     The Patients: (Please check all that apply)  _x_ Mobility limitation cannot be sufficiently resolved by the use of appropriately fitted cane or walker   _x_ Home provides adequate access between rooms, maneuvering space, and surfaces for use of the manual wheelchair that is provided  _x_ Use of a manual wheelchair will significantly improve the ability to participate in MRADLs and will be able to use it at home on a regular basis  _x_ Has not expressed an unwillingness to use the manual wheelchair in the home  _x_ Has sufficient upper extremity function and other physical and mental capabilities needed to safely self-propel the wheelchair that is provided in the home during a typical day. Limitations of strength, endurance and range of motion, or coordination, presence of pain, or deformity or absence of one or both upper extremities are relevant to the assessment of upper extremity function.    _x_ Has a caregiver who is available, willing and able to provide assistance with the wheelchair    __5'4\"_____ Height  ___195 lbs____ Weight _______________________________ ___[phone]___     SIGNATURE   NPI    ____8/30/2022_______________  DATE

## 2022-08-30 NOTE — PROGRESS NOTES
Problem: Falls - Risk of  Goal: *Absence of Falls  Description: Document Dominique Bundy Fall Risk and appropriate interventions in the flowsheet. Outcome: Progressing Towards Goal  Note: Fall Risk Interventions:  Mobility Interventions: Bed/chair exit alarm, Communicate number of staff needed for ambulation/transfer, OT consult for ADLs, Patient to call before getting OOB, Utilize walker, cane, or other assistive device         Medication Interventions: Assess postural VS orthostatic hypotension, Bed/chair exit alarm, Patient to call before getting OOB, Teach patient to arise slowly, Utilize gait belt for transfers/ambulation    Elimination Interventions: Bed/chair exit alarm, Call light in reach, Elevated toilet seat, Patient to call for help with toileting needs, Toilet paper/wipes in reach, Toileting schedule/hourly rounds    History of Falls Interventions: Bed/chair exit alarm, Consult care management for discharge planning, Door open when patient unattended, Vital signs minimum Q4HRs X 24 hrs (comment for end date), Utilize gait belt for transfer/ambulation         Problem: Patient Education: Go to Patient Education Activity  Goal: Patient/Family Education  Outcome: Progressing Towards Goal     Problem: Pain  Goal: *Control of Pain  Outcome: Progressing Towards Goal     Problem: Patient Education: Go to Patient Education Activity  Goal: Patient/Family Education  Outcome: Progressing Towards Goal     Problem: Diabetes Self-Management  Goal: *Disease process and treatment process  Description: Define diabetes and identify own type of diabetes; list 3 options for treating diabetes. Outcome: Progressing Towards Goal  Goal: *Incorporating nutritional management into lifestyle  Description: Describe effect of type, amount and timing of food on blood glucose; list 3 methods for planning meals.   Outcome: Progressing Towards Goal  Goal: *Incorporating physical activity into lifestyle  Description: State effect of exercise on blood glucose levels. Outcome: Progressing Towards Goal  Goal: *Developing strategies to promote health/change behavior  Description: Define the ABC's of diabetes; identify appropriate screenings, schedule and personal plan for screenings. Outcome: Progressing Towards Goal  Goal: *Using medications safely  Description: State effect of diabetes medications on diabetes; name diabetes medication taking, action and side effects. Outcome: Progressing Towards Goal  Goal: *Monitoring blood glucose, interpreting and using results  Description: Identify recommended blood glucose targets  and personal targets. Outcome: Progressing Towards Goal  Goal: *Prevention, detection, treatment of acute complications  Description: List symptoms of hyper- and hypoglycemia; describe how to treat low blood sugar and actions for lowering  high blood glucose level. Outcome: Progressing Towards Goal  Goal: *Prevention, detection and treatment of chronic complications  Description: Define the natural course of diabetes and describe the relationship of blood glucose levels to long term complications of diabetes. Outcome: Progressing Towards Goal  Goal: *Developing strategies to address psychosocial issues  Description: Describe feelings about living with diabetes; identify support needed and support network  Outcome: Progressing Towards Goal     Problem: Patient Education: Go to Patient Education Activity  Goal: Patient/Family Education  Outcome: Progressing Towards Goal     Problem: Pressure Injury - Risk of  Goal: *Prevention of pressure injury  Description: Document Hermilo Scale and appropriate interventions in the flowsheet.   Outcome: Progressing Towards Goal  Note: Pressure Injury Interventions:  Sensory Interventions: Assess changes in LOC, Assess need for specialty bed, Avoid rigorous massage over bony prominences, Chair cushion, Check visual cues for pain, Keep linens dry and wrinkle-free, Minimize linen layers, Sit a 90-degree angle/use footstool if needed, Pressure redistribution bed/mattress (bed type), Turn and reposition approx. every two hours (pillows and wedges if needed), Use 30-degree side-lying position    Moisture Interventions: Absorbent underpads, Apply protective barrier, creams and emollients, Check for incontinence Q2 hours and as needed, Offer toileting Q_hr, Minimize layers    Activity Interventions: Assess need for specialty bed, Increase time out of bed, Pressure redistribution bed/mattress(bed type)    Mobility Interventions: Assess need for specialty bed, Chair cushion, Float heels, HOB 30 degrees or less, Pressure redistribution bed/mattress (bed type), PT/OT evaluation, Turn and reposition approx.  every two hours(pillow and wedges)    Nutrition Interventions: Document food/fluid/supplement intake, Offer support with meals,snacks and hydration    Friction and Shear Interventions: Apply protective barrier, creams and emollients, HOB 30 degrees or less, Sit at 90-degree angle, Transferring/repositioning devices                Problem: Patient Education: Go to Patient Education Activity  Goal: Patient/Family Education  Outcome: Progressing Towards Goal     Problem: Patient Education: Go to Patient Education Activity  Goal: Patient/Family Education  Outcome: Progressing Towards Goal     Problem: Inpatient Rehab (Adult)  Goal: *LTG: Avoids injury/falls 100% of time related to deficits  Outcome: Progressing Towards Goal  Goal: *LTG: Avoids infection 100% of time related to deficits  Outcome: Progressing Towards Goal  Goal: *LTG: Absence of DVT during hospitalization  Outcome: Progressing Towards Goal  Goal: *LTG: Maintains Skin Integrity With No Evidence of Pressure Injury 100% of Time  Outcome: Progressing Towards Goal  Goal: Interventions  Outcome: Progressing Towards Goal     Problem: Patient Education: Go to Patient Education Activity  Goal: Patient/Family Education  Outcome: Progressing Towards Goal

## 2022-08-30 NOTE — ROUTINE PROCESS
SHIFT CHANGE NOTE FOR [facility]    Bedside and Verbal shift change report given to Navid Mcgee LPN (oncoming nurse) by Anna Conti RN (offgoing nurse). Report included the following information SBAR, Kardex, MAR and Recent Results.     Situation:   Code Status: Full Code   Hospital Day: 7   Problem List:   Hospital Problems  Date Reviewed: 11/18/2019            Codes Class Noted POA    Idiopathic transverse myelitis ([facility] [address] ICD-10-CM: G37.3  ICD-9-CM: 341.22  8/23/2022 Unknown         Background:   Past Medical History:   Past Medical History:   Diagnosis Date    Allergic rhinitis     Cancer ([facility] [address] 1995    Uterine Cancer (radiation, surgery)    Diverticulosis     GERD (gastroesophageal reflux disease)     Hypercholesteremia     Hypertension     Steatosis of liver 2015        Assessment:  Changes in Assessment throughout shift: No change to previous assessment    Patient has Rdz Cath: yes Reasons if yes: urinary retention   Insertion date:8/26  Shift rdz care completed: YES    Last Vitals:     Vitals:    08/28/22 2015 08/29/22 0803 08/29/22 1709 08/29/22 2133   BP: 126/84 111/76 (!) 147/89 127/86   Pulse: 93 (!) 113 93 96   Resp: 18 16 16 18   Temp: 97 °F (36.1 °C) 97.5 °F (36.4 °C) 97.5 °F (36.4 °C) 97.4 °F (36.3 °C)   SpO2: 96% 96% 98% 95%   Weight:       Height:           PAIN    Pain Assessment    Pain Intensity 1: 0 (08/30/22 0400) Pain Intensity 1: 2 (12/29/14 1105)      Pain Location 1: Abdomen      Pain Intervention(s) 1: Medication (see MAR)  Patient Stated Pain Goal: 0 Patient Stated Pain Goal: 0  Intervention effective: yes  Other actions taken for pain:      Skin Assessment  Skin color Skin Color: Appropriate for ethnicity  Condition/Temperature Skin Condition/Temp: Dry  Integrity Skin Integrity: Excoriation (groin)  Turgor    Weekly Pressure Ulcer Documentation  Pressure  Injury Documentation: No Pressure Injury Noted-Pressure Ulcer Prevention Initiated  Wound Prevention & Protection Methods  Orientation of wound Orientation of Wound Prevention: Posterior  Location of Prevention Location of Wound Prevention: Buttocks, Sacrum/Coccyx  Dressing Present Dressing Present : No  Dressing Status    Wound Offloading Wound Offloading (Prevention Methods): Bed, pressure redistribution/air    Wound Prevention Checklist  Precautions: Fall    []  Heel prevention boots placed on patient    [x]  Patient turned q2h during shift    [x]  Hermilo Order Set ordered    []  Patient on Chata bed/Specialty bed    [x]  Each Wound is documented during shift (Stage, Color, drainage, odor, measurements, and dressings)    [x]  Dual skin checks done at bedside during shift report with ADARSH Hernandez    INTAKE/OUPUT  Date 08/29/22 0700 - 08/30/22 0659 08/30/22 0700 - 08/31/22 0659   Shift 9623-6833 9508-7108 24 Hour Total 2006-6971 8080-0147 24 Hour Total   INTAKE   P.O. 880  880        P. O. 880  880      Other 0  0        Irrigation Volume Input (mL) (Urinary Catheter 08/26/22 Rdz) 0  0      Shift Total(mL/kg) 880(9.9)  880(9.9)      OUTPUT   Urine(mL/kg/hr) 2400(2.3)  2400        Urine Voided 900  900        Urine Occurrence(s) 3 x  3 x        Urine Output (mL) (Urinary Catheter 08/26/22 Rdz) 1500  1500      Stool           Stool Occurrence(s) 0 x  0 x      Shift Total(mL/kg) 2400(27.1)  2400(27.1)      NET -1520  -1520      Weight (kg) 88.5 88.5 88.5 88.5 88.5 88.5         Recommendations:  Patient needs and requests: toileting and rdz care     Pending tests/procedures: NONE     Functional Level/Equipment: Partial (one person) / Bed (comment); Jeananne Garre; Wheelchair    Fall Precautions:   Fall risk precautions were reinforced with the patient; she was instructed to call for help prior to getting up. The following fall risk precautions were continued: bed/ chair alarms, door signage, yellow bracelet and socks as well as update of the Danney Mess tool in the patient's room.    Jose J Score: 4    HEALS Safety Check    A safety check occurred in the patient's room between off going nurse and oncoming nurse listed above. The safety check included the below items  Area Items   H  High Alert Medications Verify all high alert medication drips (heparin, PCA, etc.)   E  Equipment Suction is set up for ALL patients (with ryan)  Red plugs utilized for all equipment (IV pumps, etc.)  WOWs wiped down at end of shift. Room stocked with oxygen, suction, and other unit-specific supplies   A  Alarms Bed alarm is set for fall risk patients  Ensure chair alarm is in place and activated if patient is up in a chair   L  Lines Check IV for any infiltration  Valle bag is empty if patient has a Valle   Tubing and IV bags are labeled   S  Safety   Room is clean, patient is clean, and equipment is clean. Hallways are clear from equipment besides carts. Fall bracelet on for fall risk patients  Ensure room is clear and free of clutter  Suction is set up for ALL patients (with ryan)  Hallways are clear from equipment besides carts.    Isolation precautions followed, supplies available outside room, sign posted     Nasra Gifford RN

## 2022-08-30 NOTE — PROGRESS NOTES
Met with patient and family member Chiara at bedside. Signed FOC for [facility]. Referral entered for [facility]. Equipment orders  for LIBERTY Hernandez 23 & BSC sent to [facility] for fulfillment. Pt states she has a TTB and family member may have a RW  she can use which belonged to her father or she will obtain. Cont to follow for DC planning needs.

## 2022-08-30 NOTE — PROGRESS NOTES
Problem: Mobility Impaired (Adult and Pediatric)  Goal: *Acute Goals and Plan of Care (Insert Text)  Description: Physical Therapy Short Term Goals  Initiated 2022 and to be accomplished within 7 day(s) (2022)  1. Patient will move from supine to sit and sit to supine  in bed with modified independence. 2.  Patient will transfer from bed to chair and chair to bed with supervision/set-up using the least restrictive device. 3.  Patient will perform sit to stand with maximal assistance. 4.  Patient will propel w/c over level surfaces greater than or equal to 150 feet with modified independence. Physical Therapy Long Term Goals  Initiated 2022 and to be accomplished within 21 day(s) (2022)  1. Patient will move from supine to sit and sit to supine , scoot up and down, and roll side to side in bed with independence. 2.  Patient will transfer from bed to chair and chair to bed with modified independence using the least restrictive device. 3.  Patient will perform sit to stand with minimal assistance/contact guard assist.  4.  Patient will ambulate with moderate assistance  for 25 feet with the least restrictive device. 5.  Patient will ascend/descend 4 stairs with 1 handrail(s) with moderate assistance. 6.  Patient will propel w/c over level surfaces, surfaces of varying grades, thresholds and ramps with modified independence. Outcome: Progressing Towards Goal   PHYSICAL THERAPY TREATMENT    Patient: Amber John (19 y.o. female)  Date: 2022  Diagnosis: [facility] [G37.3]   Precautions: Fall  Chart, physical therapy assessment, plan of care and goals were reviewed. Time In:930  Time Out:1030    Patient seen for: Balance activities;Gait training;Patient education;Transfer training; Wheelchair mobility    Pain:  Patient denied pain during session. Patient identified with name and : yes    SUBJECTIVE:      Patient agreeable to treatment. OBJECTIVE DATA SUMMARY:    Objective:     TRANSFERS Daily Assessment     Transfer Type: Other (stand step with RW)  Transfer Assistance : 4 (Minimal assistance)  Sit to Stand Assistance: Minimal assistance (CG/min A)        GAIT Daily Assessment    Gait Description (WDL) Exceptions to WDL    Gait Abnormalities Ataxic;Trendelenburg;Trunk sway increased;Decreased step clearance (narrow COLIN initially, cued for wider COLIN)    Assistive device Gait belt;Walker, rolling    Ambulation assistance - level surface 4 (Minimal assistance)    Distance 22 Feet (ft) (22 ft, then 29 ft with seated rests in between bouts)    Ambulation assistance- uneven surface  (not tested today)    Comments Patient performing 2 gait bouts with cues for wider COLIN for safety since patient with tendency to gait with narrow COLIN. Patient with slight ataxic movements particularly left LE but improved with practice. Patient observed to have increased fear of falling but willing to participate in gait. STEPS/STAIRS Daily Assessment     Steps/Stairs ambulated 2    Assistance Required 4 (Minimal assistance)    Rail Use Both (2 rails progressing to 1 railing using sidestepping method (leading right LE up step))    Comments  Patient reporting that she will not have w/c ramp in place by discharge, so stairs training big emphasis of AM treatment. Patient negotiating 4\" step initially x 2 reps, bilat handrails, then after seated rest negotiating 6\" step with bilat railings x 2 reps, leading with right LE ascending, descending left LE. After seated rest, negotiating 6\" step using sidestepping method with left railing to ascend step with right LE, bilat UE support, x 3 reps (patient has 3 steps until she gets to stoop, then threshold step to get into front door). Patient then practicing one last bout up/down 6\" stairs x 2 reps before fatiguing.      Curbs/Ramps  (not tested today)        BALANCE Daily Assessment     Sitting - Static: Good (unsupported)  Sitting - Dynamic: Good (unsupported)  Standing - Static: Fair  Standing - Dynamic : Impaired        WHEELCHAIR MOBILITY Daily Assessment     Able to Propel (ft): 186 feet  Functional Level: 6  Curbs/Ramps Assist Required (FIM Score): 0 (Not tested)  Wheelchair Setup Assist Required : 5 (Stand-by assistance)  Wheelchair Management: Manages left brake;Manages right brake        ASSESSMENT:  Patient making good gains toward functional goals but patient continues to fatigue quickly. Continues to have decreased gait endurance and is not yet performing gait for household distances, will need w/c for discharge home at this time. Progression toward goals:  [x]      Improving appropriately and progressing toward goals  []      Improving slowly and progressing toward goals  []      Not making progress toward goals and plan of care will be adjusted      PLAN:  Patient continues to benefit from skilled intervention to address the above impairments. Continue treatment per established plan of care. Discharge Recommendations:  Home Health and Outpatient  Further Equipment Recommendations for Discharge:  rolling walker and wheelchair 20\".        Estimated Discharge Date: 9/3/2022    Activity Tolerance:   Fair due to fatigue    After treatment:   [] Patient left in no apparent distress in bed  [] Patient left in no apparent distress sitting up in chair  [x] Patient left in no apparent distress in w/c mobilizing under own power  [] Patient left in no apparent distress dining area  [] Patient left in no apparent distress mobilizing under own power  [] Call bell left within reach  [x] Nursing notified  [x] Caregiver present  [] Bed alarm activated   [] Chair alarm activated      Mary Hansen, PT  8/30/2022

## 2022-08-30 NOTE — PROGRESS NOTES
Problem: Mobility Impaired (Adult and Pediatric)  Goal: *Acute Goals and Plan of Care (Insert Text)  Description: Physical Therapy Short Term Goals  Initiated 8/24/2022 and to be accomplished within 7 day(s) (8/31/2022)  1. Patient will move from supine to sit and sit to supine  in bed with modified independence. 2.  Patient will transfer from bed to chair and chair to bed with supervision/set-up using the least restrictive device. 3.  Patient will perform sit to stand with maximal assistance. 4.  Patient will propel w/c over level surfaces greater than or equal to 150 feet with modified independence. Physical Therapy Long Term Goals  Initiated 8/24/2022 and to be accomplished within 21 day(s) (09/14/2022)  1. Patient will move from supine to sit and sit to supine , scoot up and down, and roll side to side in bed with independence. 2.  Patient will transfer from bed to chair and chair to bed with modified independence using the least restrictive device. 3.  Patient will perform sit to stand with minimal assistance/contact guard assist.  4.  Patient will ambulate with moderate assistance  for 25 feet with the least restrictive device. 5.  Patient will ascend/descend 4 stairs with 1 handrail(s) with moderate assistance. 6.  Patient will propel w/c over level surfaces, surfaces of varying grades, thresholds and ramps with modified independence. Outcome: Progressing Towards Goal   PHYSICAL THERAPY TREATMENT    Patient: Steve Alvarez (89 y.o. female)  Date: 8/30/2022  Diagnosis: [facility] [G37.3]   Precautions: Fall  Chart, physical therapy assessment, plan of care and goals were reviewed. Time In:1425  Time Out:1500    Patient seen for: Transfer training; Wheelchair mobility;Gait training; Therapeutic exercise    Pain:  Pt pain was reported as no c/o pre-treatment. Pt pain was reported as no c/o post-treatment.   Intervention: NA     Patient identified with name and : yes     SUBJECTIVE:      Pt reports daughter will be staying with her to help her. OBJECTIVE DATA SUMMARY:    Objective:     TRANSFERS Daily Assessment     Transfer Type: Other  Other: squat pivot  Transfer Assistance : 4 (Contact guard assistance)  Sit to Stand Assistance: Contact guard assistance  Pt presents sitting in bed and performed squat pivot txfr bed<->w/c with CGA. Pt performed sit to stand from w/c with CGA and self correction of proper hand placement. GAIT Daily Assessment    Gait Description (WDL) Exceptions to WDL    Gait Abnormalities Decreased step clearance    Assistive device Walker, rolling;Gait belt    Ambulation assistance - level surface 4 (Contact guard assistance)    Distance 28 Feet (ft) (x2)    Ambulation assistance- uneven surface  (not tested today)    Comments Pt ambulated 2x28ft with RW and CGA, performing slow and short B step lengths. BALANCE Daily Assessment     Sitting - Static: Good (unsupported)  Sitting - Dynamic: Good (unsupported)  Standing - Static: Fair  Standing - Dynamic : Impaired        WHEELCHAIR MOBILITY Daily Assessment     Able to Propel (ft): 186 feet  Functional Level: 6  Curbs/Ramps Assist Required (FIM Score): 0 (Not tested)  Wheelchair Setup Assist Required : 5 (Stand-by assistance)  Wheelchair Management: Manages left brake;Manages right brake  Pt propelled w/c from room to gym with BLE Dian. THERAPEUTIC EXERCISES Daily Assessment       Standing marching and heel raises x15 each with CGA using RW.         ASSESSMENT:  Pt demo'd slight fatigue with each task during PM session. Progression toward goals:  []      Improving appropriately and progressing toward goals  [x]      Improving slowly and progressing toward goals  []      Not making progress toward goals and plan of care will be adjusted      PLAN:  Patient continues to benefit from skilled intervention to address the above impairments.   Continue treatment per established plan of care. Discharge Recommendations:  Home Health  Further Equipment Recommendations for Discharge:  rolling walker and wheelchair 20\"      Estimated Discharge Date:9/3/2022    Activity Tolerance:   fair  Please refer to the flowsheet for vital signs taken during this treatment.     After treatment:   [x] Patient left in no apparent distress in bed  [] Patient left in no apparent distress sitting up in chair  [] Patient left in no apparent distress in w/c mobilizing under own power  [] Patient left in no apparent distress dining area  [] Patient left in no apparent distress mobilizing under own power  [x] Call bell left within reach  [] Nursing notified  [] Caregiver present  [] Bed alarm activated   [] Chair alarm activated      ELIZ Harrington  8/30/2022

## 2022-08-30 NOTE — PROGRESS NOTES
Problem: Falls - Risk of  Goal: *Absence of Falls  Description: Document Earma Evelia Fall Risk and appropriate interventions in the flowsheet.   Outcome: Progressing Towards Goal  Note: Fall Risk Interventions:  Mobility Interventions: Bed/chair exit alarm         Medication Interventions: Bed/chair exit alarm    Elimination Interventions: Call light in reach, Bed/chair exit alarm    History of Falls Interventions: Bed/chair exit alarm         Problem: Patient Education: Go to Patient Education Activity  Goal: Patient/Family Education  Outcome: Progressing Towards Goal     Problem: Pain  Goal: *Control of Pain  Outcome: Progressing Towards Goal

## 2022-08-30 NOTE — DIABETES MGMT
Diabetes/ Glycemic Control Plan of Care  Recommendations:   Blood glucose within target range. Will continue inpatient monitoring. Fasting/ Morning blood glucose:   Lab Results   Component Value Date/Time    Glucose 122 (H) 08/30/2022 05:29 AM    Glucose (POC) 110 08/30/2022 12:00 PM     IV Fluids containing dextrose: none   Steroids:  none     Blood glucose values:        Latest Reference Range & Units 8/29/22 11:55 8/29/22 17:06 8/29/22 21:02 8/30/22 07:42 8/30/22 12:00   GLUCOSE,FAST - POC 70 - 110 mg/dL 107 88 121 (H) 178 (H) 110   (H): Data is abnormally high  Within target range (70-180mg/dL):  progressing. Only 1 BG out of range in the past 36 hours  Current insulin orders:   Lispro corrective insulin coverage AC&HS as needed. Metformin 500 mg daily with breakfast.  Total Daily Dose previous 24 hours = 4 units   Current A1c:   Lab Results   Component Value Date/Time    Hemoglobin A1c 6.7 (H) 08/24/2022 06:08 AM      Equivalent  to an average Blood Glucose of 146 mg/dl for 2-3 months prior to admission  Adequate glycemic control PTA: YES (new diagnosis)  Nutrition/Diet:   Active Orders   Diet    ADULT DIET Regular; 4 carb choices (60 gm/meal); Low Fat/Low Chol/High Fiber/2 gm Na      Meal Intake:  Patient Vitals for the past 168 hrs:   % Diet Eaten   08/30/22 0912 76 - 100%   08/29/22 1815 76 - 100%   08/29/22 1314 51 - 75%   08/29/22 1031 76 - 100%   08/28/22 1800 76 - 100%   08/28/22 1317 76 - 100%   08/27/22 1800 76 - 100%   08/27/22 1300 51 - 75%   08/27/22 0919 51 - 75%   08/26/22 1819 51 - 75%   08/26/22 1304 76 - 100%   08/26/22 0925 51 - 75%   08/25/22 1300 76 - 100%   08/25/22 1035 76 - 100%   08/24/22 1800 76 - 100%   08/24/22 1400 26 - 50%   08/24/22 0852 51 - 75%       Supplement Intake:  No data found. Home diabetes medications:   Key Antihyperglycemic Medications       Patient is on no antihyperglycemic meds.           Plan/Goals:   Blood glucose will be within target of 70 - 180 mg/dl within 72 hours  Reinforce dietary and medication compliance at home.        Education:  [x] Refer to Diabetes Education Record (8/26/2022)                       [] Education not indicated at this time     Jami Carrington, [address] [facility] CDE  Ext [phone]

## 2022-08-30 NOTE — ROUTINE PROCESS
SHIFT CHANGE NOTE FOR [facility]    Bedside and Verbal shift change report given to MISHA Mcgraw (oncoming nurse) by Francesca Carbone RN (offgoing nurse). Report included the following information SBAR, Kardex, MAR and Recent Results.     Situation:   Code Status: Full Code   Hospital Day: 7   Problem List:   Hospital Problems  Date Reviewed: 11/18/2019            Codes Class Noted POA    Idiopathic transverse myelitis [facility]) ICD-10-CM: G37.3  ICD-9-CM: 341.22  8/23/2022 Unknown           Background:   Past Medical History:   Past Medical History:   Diagnosis Date    Allergic rhinitis     Cancer ([facility] 1995    Uterine Cancer (radiation, surgery)    Diverticulosis     GERD (gastroesophageal reflux disease)     Hypercholesteremia     Hypertension     Steatosis of liver 2015        Assessment:  Changes in Assessment throughout shift: no changes during the shift       Patient has Rdz Cath: yes Reasons if yes: urinary retention due to transverse myelitis    Insertion date:8/26/2022  Shift rdz care completed: YES    Last Vitals:     Vitals:    08/29/22 1709 08/29/22 2133 08/30/22 0839 08/30/22 0913   BP: (!) 147/89 127/86 137/82 127/75   Pulse: 93 96 (!) 113 100   Resp: 16 18 16 16   Temp: 97.5 °F (36.4 °C) 97.4 °F (36.3 °C) 97.5 °F (36.4 °C) 97.5 °F (36.4 °C)   SpO2: 98% 95% 96% 98%   Weight:       Height:           PAIN    Pain Assessment    Pain Intensity 1: 0 (08/30/22 1200) Pain Intensity 1: 2 (12/29/14 1105)      Pain Location 1: Abdomen      Pain Intervention(s) 1: Medication (see MAR)  Patient Stated Pain Goal: 0 Patient Stated Pain Goal: 0  Intervention effective: {yes/ no default no:64352::\"yes\"}  Other actions taken for pain:      Skin Assessment  Skin color Skin Color: Appropriate for ethnicity  Condition/Temperature Skin Condition/Temp: Dry, Warm  Integrity Skin Integrity: Intact  Turgor    Weekly Pressure Ulcer Documentation  Pressure  Injury Documentation: No Pressure Injury Noted-Pressure Ulcer Prevention Initiated  Wound Prevention & Protection Methods  Orientation of wound Orientation of Wound Prevention: Posterior  Location of Prevention Location of Wound Prevention: Buttocks, Elbow, Heel, Sacrum/Coccyx  Dressing Present Dressing Present : No  Dressing Status    Wound Offloading Wound Offloading (Prevention Methods): Bed, pressure reduction mattress, Blankets, Pillows, Wheelchair, Turning    Wound Prevention Checklist  Precautions: Fall    []  Heel prevention boots placed on patient    [x]  Patient turned q2h during shift    [x]  Hermilo Order Set ordered    []  Patient on Chata bed/Specialty bed    [x]  Each Wound is documented during shift (Stage, Color, drainage, odor, measurements, and dressings)    [x]  Dual skin checks done at bedside during shift report with MISHA Mcgraw     INTAKE/OUPUT  Date 08/29/22 0700 - 08/30/22 0659 08/30/22 0700 - 08/31/22 0659   Shift [id number] 9922-3902 24 Hour Total 9329-7518 1589-9627 24 Hour Total   INTAKE   P.O. 880  880 480  480     P. O. 880  880 480  480   Other 0  0 0  0     Irrigation Volume Input (mL) (Urinary Catheter 08/26/22 Valle) 0  0 0  0   Shift Total(mL/kg) 880(9.9)  880(9.9) 480(5.4)  480(5.4)   OUTPUT   Urine(mL/kg/hr) 2400(2.3) 800(0.8) 3200(1.5) 1760  1760     Urine Voided 900  900 880  880     Urine Occurrence(s) 3 x  3 x 2 x  2 x     Urine Output (mL) (Urinary Catheter 08/26/22 Valle) 6395 176 2284 880  880   Stool           Stool Occurrence(s) 0 x  0 x 2 x  2 x   Shift Total(mL/kg) 2400(27.1) 800(9) 8261(53.5) 1760(19.9)  1760(19.9)   NET -1520 -800 -2320 -1280  -1280   Weight (kg) 88.5 88.5 88.5 88.5 88.5 88.5       Recommendations:  Patient needs and requests: toileting     Pending tests/procedures: no      Functional Level/Equipment: Partial (one person) / Jeananne Garre; Wheelchair;Bed (comment)    Fall Precautions:   Fall risk precautions were reinforced with the patient; she was instructed to call for help prior to getting up.  The following fall risk precautions were continued: bed/ chair alarms, door signage, yellow bracelet and socks as well as update of the Emery Osborne tool in the patient's room. Jose J Score: 4    HEALS Safety Check    A safety check occurred in the patient's room between off going nurse and oncoming nurse listed above. The safety check included the below items  Area Items   H  High Alert Medications Verify all high alert medication drips (heparin, PCA, etc.)   E  Equipment Suction is set up for ALL patients (with ryan)  Red plugs utilized for all equipment (IV pumps, etc.)  WOWs wiped down at end of shift. Room stocked with oxygen, suction, and other unit-specific supplies   A  Alarms Bed alarm is set for fall risk patients  Ensure chair alarm is in place and activated if patient is up in a chair   L  Lines Check IV for any infiltration  Valle bag is empty if patient has a Valle   Tubing and IV bags are labeled   S  Safety   Room is clean, patient is clean, and equipment is clean. Hallways are clear from equipment besides carts. Fall bracelet on for fall risk patients  Ensure room is clear and free of clutter  Suction is set up for ALL patients (with ryan)  Hallways are clear from equipment besides carts.    Isolation precautions followed, supplies available outside room, sign posted     Salvador Quezada RN

## 2022-08-30 NOTE — PROGRESS NOTES
Problem: Falls - Risk of  Goal: *Absence of Falls  Description: Document Corey Allen Fall Risk and appropriate interventions in the flowsheet. Outcome: Progressing Towards Goal  Note: Fall Risk Interventions:  Mobility Interventions: Communicate number of staff needed for ambulation/transfer, Bed/chair exit alarm, Patient to call before getting OOB, Utilize gait belt for transfers/ambulation, Utilize walker, cane, or other assistive device         Medication Interventions: Bed/chair exit alarm, Patient to call before getting OOB, Teach patient to arise slowly, Utilize gait belt for transfers/ambulation    Elimination Interventions: Bed/chair exit alarm, Call light in reach, Elevated toilet seat, Patient to call for help with toileting needs, Toilet paper/wipes in reach, Toileting schedule/hourly rounds    History of Falls Interventions: Bed/chair exit alarm, Vital signs minimum Q4HRs X 24 hrs (comment for end date), Door open when patient unattended         Problem: Patient Education: Go to Patient Education Activity  Goal: Patient/Family Education  Outcome: Progressing Towards Goal     Problem: Pain  Goal: *Control of Pain  Outcome: Progressing Towards Goal     Problem: Patient Education: Go to Patient Education Activity  Goal: Patient/Family Education  Outcome: Progressing Towards Goal     Problem: Diabetes Self-Management  Goal: *Disease process and treatment process  Description: Define diabetes and identify own type of diabetes; list 3 options for treating diabetes. Outcome: Progressing Towards Goal  Goal: *Incorporating nutritional management into lifestyle  Description: Describe effect of type, amount and timing of food on blood glucose; list 3 methods for planning meals. Outcome: Progressing Towards Goal  Goal: *Incorporating physical activity into lifestyle  Description: State effect of exercise on blood glucose levels.   Outcome: Progressing Towards Goal  Goal: *Developing strategies to promote health/change behavior  Description: Define the ABC's of diabetes; identify appropriate screenings, schedule and personal plan for screenings. Outcome: Progressing Towards Goal  Goal: *Using medications safely  Description: State effect of diabetes medications on diabetes; name diabetes medication taking, action and side effects. Outcome: Progressing Towards Goal  Goal: *Monitoring blood glucose, interpreting and using results  Description: Identify recommended blood glucose targets  and personal targets. Outcome: Progressing Towards Goal  Goal: *Prevention, detection, treatment of acute complications  Description: List symptoms of hyper- and hypoglycemia; describe how to treat low blood sugar and actions for lowering  high blood glucose level. Outcome: Progressing Towards Goal  Goal: *Prevention, detection and treatment of chronic complications  Description: Define the natural course of diabetes and describe the relationship of blood glucose levels to long term complications of diabetes. Outcome: Progressing Towards Goal  Goal: *Developing strategies to address psychosocial issues  Description: Describe feelings about living with diabetes; identify support needed and support network  Outcome: Progressing Towards Goal  Goal: *Insulin pump training  Outcome: Progressing Towards Goal  Goal: *Sick day guidelines  Outcome: Progressing Towards Goal  Goal: *Patient Specific Goal (EDIT GOAL, INSERT TEXT)  Outcome: Progressing Towards Goal     Problem: Patient Education: Go to Patient Education Activity  Goal: Patient/Family Education  Outcome: Progressing Towards Goal     Problem: Pressure Injury - Risk of  Goal: *Prevention of pressure injury  Description: Document Hermilo Scale and appropriate interventions in the flowsheet.   Outcome: Progressing Towards Goal  Note: Pressure Injury Interventions:  Sensory Interventions: Assess changes in LOC, Check visual cues for pain, Minimize linen layers, Maintain/enhance activity level, Sit a 90-degree angle/use footstool if needed, Use 30-degree side-lying position, Turn and reposition approx. every two hours (pillows and wedges if needed)    Moisture Interventions: Absorbent underpads, Check for incontinence Q2 hours and as needed, Maintain skin hydration (lotion/cream), Offer toileting Q_hr    Activity Interventions: Assess need for specialty bed, Chair cushion, Pressure redistribution bed/mattress(bed type)    Mobility Interventions: Assess need for specialty bed, HOB 30 degrees or less, Turn and reposition approx.  every two hours(pillow and wedges)    Nutrition Interventions: Document food/fluid/supplement intake, Offer support with meals,snacks and hydration    Friction and Shear Interventions: HOB 30 degrees or less, Sit at 90-degree angle, Transfer aides:transfer board/Dianelys lift/ceiling lift, Transferring/repositioning devices                Problem: Patient Education: Go to Patient Education Activity  Goal: Patient/Family Education  Outcome: Progressing Towards Goal     Problem: Patient Education: Go to Patient Education Activity  Goal: Patient/Family Education  Outcome: Progressing Towards Goal     Problem: Inpatient Rehab (Adult)  Goal: *LTG: Avoids injury/falls 100% of time related to deficits  Outcome: Progressing Towards Goal  Goal: *LTG: Avoids infection 100% of time related to deficits  Outcome: Progressing Towards Goal  Goal: *LTG: Absence of DVT during hospitalization  Outcome: Progressing Towards Goal  Goal: *LTG: Maintains Skin Integrity With No Evidence of Pressure Injury 100% of Time  Outcome: Progressing Towards Goal  Goal: Interventions  Outcome: Progressing Towards Goal     Problem: Patient Education: Go to Patient Education Activity  Goal: Patient/Family Education  Outcome: Progressing Towards Goal

## 2022-08-30 NOTE — PROGRESS NOTES
Problem: Self Care Deficits Care Plan (Adult)  Goal: *Therapy Goal (Edit Goal, Insert Text)  Description: Occupational Therapy Goals   Long Term Goals  Initiated 2022 and to be accomplished within 2-3 week(s) 2022  1. Pt will perform self-feeding with Deb. 2. Pt will perform grooming with Deb. 3. Pt will perform UB bathing with Deb  4. Pt will perform LB bathing with Deb. 5. Pt will perform tub/shower transfer with Deb. 6. Pt will perform UB dressing with Deb. 7. Pt will perform LB dressing with Deb. 8. Pt will perform toileting task with Deb. 9. Pt will perform toilet transfer with Deb. 10.  Pt will perform an IADL task with good safety and Deb. Short Term Goals   Initiated 2022 and to be accomplished within 7 day(s) 2022; updated 22  1. Pt will perform self-feeding with S GM 22  2. Pt will perform grooming with S. GM 22  3. Pt will perform UB bathing with S.  4. Pt will perform LB bathing with S  5. Pt will perform tub/shower transfer with S. GM 22  6. Pt will perform UB dressing with S.  7. Pt will perform LB dressing with S  8. Pt will perform toileting task with S.  9. Pt will perform toilet transfer with S.GM 22      OT WEEKLY PROGRESS NOTE  Patient Amirah Amayag      Time In: 4052  Time Out: 0825    Medical Diagnosis:  Idiopathic transverse myelitis ([facility] [G37.3] <principal problem not specified>     Pain at start of tx:0/10 pain or discomfort. Pain at stop of tx:0/10 pain or discomfort. Patient identified with name and :Yes  Subjective: \"I wont have the ramp. Ignacio Magic Ignacio Magic \"         Objective: Self Care and Functional Transfers      Outcome Measures:      AROM: [facility] [address]      COGNITION/PERCEPTION Initial Assessment Weekly Progress Assessment 2022   Premorbid Reading Status Literate  Literate   Premorbid Writing Status [facility]    Arousal/Alertness Generalized responses General responses    Orientation Level Oriented X4 Ox4    Visual Fields Praxis       Body Scheme       COMPREHENSION MODE Initial Assessment Weekly Progress Assessment 8/30/2022   Primary Mode of Comprehension Auditory  Auditory   Hearing Aide None None    Corrective Lenses Reading glasses Reading glasses    Score 6 6      EXPRESSION Initial Assessment Weekly Progress Assessment 8/30/2022   Primary Mode of Expression Verbal Verbal   Score 6 6   Comments         SOCIAL INTERACTION/ PRAGMATICS Initial Assessment Weekly Progress Assessment 8/30/2022   Score 4 4   Comments         PROBLEM SOLVING Initial Assessment Weekly Progress Assessment 8/30/2022   Score 4 4   Comments         MEMORY Initial Assessment Weekly Progress Assessment 8/30/2022   Score 5 5   Comments         EATING Initial Assessment Weekly Progress Assessment 8/30/2022   Functional Level 6 (set-up) 6    Comments Self-feeding (Mod I) following set-up of lunch tray; pt able to open lids and small containers. GROOMING Initial Assessment Weekly Progress Assessment 8/30/2022   Functional Level 5 (setup EOB) Functional Level: 5   Tasks completed by patient Washed face, Washed hands, Brushed teeth Tasks Completed by Patient: Washed face; Washed hands   Comments Patient performed hand hygiene (set-up) w/c level at sink following toileting self-care. BATHING Initial Assessment Weekly Progress Assessment 8/30/2022   Functional Level 4 (4 without LHS and 5 with LHS) Functional Level: 5   Body parts patient bathed Abdomen, Arm, left, Arm, right, Buttocks, Chest, Lower leg and foot, left, Lower leg and foot, right, Precious area, Thigh, left Body Parts Patient Bathed: Abdomen;Arm, left;Arm, right;Buttocks; Chest;Lower leg and foot, left; Lower leg and foot, right;Precious area; Thigh, left; Thigh, right   Comments         TUB/SHOWER TRANSFER INDEPENDENCE Initial Assessment Weekly Progress Assessment 8/30/2022   Score 5 (vcs for ant weight shift and clearing sacrum) Tub/Shower Transfer Score: 5 (pt used squat pivot method an declned using RW)   Comments         UPPER BODY DRESSING/UNDRESSING Initial Assessment Weekly Progress Assessment 8/30/2022   Functional Level 5 Functional Level: 5 (setup)   Items applied/Steps completed Pullover (4 steps) (setup EOB) Items Applied/Steps Completed: Pullover (4 steps); Bra (3 steps)   Comments         LOWER BODY DRESSING/UNDRESSING Initial Assessment Weekly Progress Assessment 8/30/2022   Functional Level 5 (setup EOB) Functional Level: 4 (asst for clothing management and vcs to manage catheter)   Items applied/Steps completed Sock, left (1 step), Sock, right (1 step), Underpants (3 steps), Elastic waist pants (3 steps) Items Applied/Steps Completed: Elastic waist pants (3 steps); Sock, left (1 step); Sock, right (1 step); Underpants (3 steps)   Comments  (pt managed pants over hips long sitting in bed)       TOILETING Initial Assessment Weekly Progress Assessment 8/30/2022   Functional Level 5 (setup) 5    Comments Patient demonstrating ability to weight shift from side to side while seated on commode in order to pull shorts and brief down over hips; pt requiring (Min A) in order to pull shorts down completely as they were partially caught underneath the back of her legs and 2/2 rdz catheter. Pt requiring (Min A) for adjusting brief/ shorts up in back at conclusion of toileting task. Pt performed hygiene (set-up) level; pt having BM at this time. TOILET TRANSFER INDEPENDENCE Initial Assessment Weekly Progress Assessment 8/30/2022   Transfer score 5 (based on sqaut pivot from EOB to w/c)  5   Comments Squat pivot transfer (CGA/ Min A) w/c <-> elevated seat over toilet; gym bathroom          THEREX to increase strength and endurance for ADLs:   Sci Fit up to 10 minutes at level 2.0. Pt propelled w/c using BLE form room <>gym. ASSESSMENT:  Pt showing Ta-Setup with bathing, dressing and functional transfers.   Pt would benefit from increasing I with ADLs by getting items needed for shower task (ie- soap, clothes, towels), managing catheter through pants and using RW for shower transfers. Progression toward goals:  [x]          Improving appropriately and progressing toward goals  []          Improving slowly and progressing toward goals  []          Not making progress toward goals and plan of care will be adjusted     PLAN:  Patient continues to benefit from skilled intervention to address the above impairments. Continue treatment per established plan of care. Discharge Recommendations:  Home Health  Further Equipment Recommendations for Discharge:  bedside commode and transfer bench     Please refer to the flow sheet for vital signs taken during this treatment. After treatment:   [x]  Patient left in no apparent distress sitting up in chair  []  Patient left in no apparent distress in bed  []  Call bell left within reach  []  Nursing notified  []  Caregiver present  []  Bed alarm activated    COMMUNICATION/EDUCATION:   [] Home safety education was provided and the patient/caregiver indicated understanding. [x] Patient/family have participated as able in goal setting and plan of care. [] Patient/family agree to work toward stated goals and plan of care. [] Patient understands intent and goals of therapy, but is neutral about his/her participation. [] Patient is unable to participate in goal setting and plan of care. Plan of Care: Please see Care Plan for updated STG/LTGs.    Family Training:    Estimated LOS: 9/3/22    Lauren Panchal OT  8/30/2022        Outcome: Progressing Towards Goal  Goal: Interventions  Outcome: Progressing Towards Goal

## 2022-08-30 NOTE — INTERDISCIPLINARY ROUNDS
[facility] PHYSICAL REHABILITATION  [address] [place] [address], [address]    INPATIENT REHABILITATION  PRE-TEAM CONFERENCE SUMMARY     Date of Conference: 8/31/2022    Patient Information:        Name: James Wallis Age / Sex: 78 y.o. / female   CSN: [id number] MRN: [id number]   Admit Date: 8/23/2022 Length of Stay: 7 days     Primary Rehabilitation Diagnosis  1. Impaired Mobility and ADLs  2. Acute Transverse Myelitis     Comorbidities  Patient Active Problem List   Diagnosis Code    History of uterine cancer Z85.42    Hydronephrosis N13.30    Idiopathic transverse myelitis (H[facility]) G37.3         Therapy:     FIM SCORES Initial Assessment Weekly Progress Assessment 8/30/2022   Eating Functional Level: 6 (set-up)  Comments: Self-feeding (Mod I) following set-up of lunch tray; pt able to open lids and small containers. 6   Swallowing     Grooming 5 (setup EOB) 5   Bathing 4 (4 without LHS and 5 with LHS) 5   Upper Body Dressing Functional Level: 5  Items Applied/Steps Completed: Pullover (4 steps) (setup EOB) Functional Level: 5 (setup)  Items Applied/Steps Completed: Pullover (4 steps); Bra (3 steps)   Lower Body Dressing Functional Level: 5 (setup EOB)  Items Applied/Steps Completed: Sock, left (1 step), Sock, right (1 step), Underpants (3 steps), Elastic waist pants (3 steps)  Comments:  (pt managed pants over hips long sitting in bed) Functional Level: 4 (asst for clothing management and vcs to manage catheter)  Items Applied/Steps Completed: Elastic waist pants (3 steps); Sock, left (1 step); Sock, right (1 step); Underpants (3 steps)   Toileting Functional Level: 5 (setup)  Comments: Patient demonstrating ability to weight shift from side to side while seated on commode in order to pull shorts and brief down over hips; pt requiring (Min A) in order to pull shorts down completely as they were partially caught underneath the back of her legs and 2/2 rdz catheter.  Pt requiring (Min A) for adjusting brief/ shorts up in back at conclusion of toileting task. Pt performed hygiene (set-up) level; pt having BM at this time. 5   Bladder 0 1   Bowel  0 0   Toilet Transfer [place] Toilet Transfer Score: 5 (based on sqaut pivot from EOB to w/c)  Comments: Squat pivot transfer (CGA/ Min A) w/c <-> elevated seat over toilet; gym bathroom     Tub/Shower Transfer [place] Tub or Shower Type: Tub/Shower combination  Tub/Shower Transfer Score: 5 (vcs for ant weight shift and clearing sacrum) Tub/Shower Transfer Score: 5 (pt used squat pivot method an declned using RW)     Comprehension Primary Mode of Comprehension: Auditory  Score: 6       Expression Primary Mode of Expression: Verbal  Score: 6     Social Interaction Score: 4     Problem Solving Score: 4     Memory Score: 5       FIM SCORES Initial Assessment Weekly Progress Assessment 8/30/2022   Bed/Chair/Wheelchair Transfers Transfer Type: Other  Other: squat pivot  Transfer Assistance : 1 (Total assistance) (unable to achieve stand for stand step,, was able to perform lateral transfer with minimal physical assistance and maximal verbal cuing utilizing squat pivot technique with multiple attempts at clearing sacrum as pt does not maintain sacral clearance.)  Sit to Stand Assistance: Unable at this time Transfer Type:  Other (stand step with RW)  Transfer Assistance : 4 (Minimal assistance)  Sit to Stand Assistance: Minimal assistance (CG/min A)   Bed Mobility Rolling Right 6 (Modified independent)   Rolling Left 6 (Modified independent)   Supine to Sit 5 (Supervision)   Sit to Stand Unable at this time   Sit to Supine 5 (Supervision)    Rolling Right    Mod I   Rolling Left    Mod I   Supine to Sit    Mod I   Sit to Stand   Minimal assistance (CG/min A)   Sit to Supine    Mod I      Locomotion (W/C) Able to Propel (ft): 186 feet  Functional Level: 4  Curbs/Ramps Assist Required (FIM Score): 0 (Not tested)  Wheelchair Setup Assist Required : 3 (Moderate assistance)  Wheelchair Management: Manages left brake, Manages right brake Function 6  Setup Assistance  5 (Stand-by assistance)      Locomotion (W/C distance)   186 feet   Locomotion (Walk)  (Unable at this time) 4 (Minimal assistance)  Gait belt;Walker, rolling   Locomotion (Walk dist.) 0 Feet (ft) 22 Feet (ft) (22 ft, then 29 ft with seated rests in between bouts)   Steps/Stairs Steps/Stairs Ambulated (#): 0  Level of Assist :  (Unable at this time)  Rail Use: Both (2 rails progressing to 1 railing using sidestepping method (leading right LE up step))  2-3 stairs with min A, using one railing with sidestepping method         Nursing:     Neuro: AAA&O X4 , Patient is oriented X 4   Respiratory:   [x] WNL   [] O2 LPM:   Other:  Peripheral Vascular:   [] TEDS present   [x] Edema present ____ Grade   Cardiac:   [] WNL   [x] Other heart rate elevated   Genitourinary:   [] continent   [] incontinent   [x] rdz  Abdominal __8/29/22_____ LBM  GI: _Regualr 4 Carb ______ Diet _Thin___ Liquids _____ tube feeds  Musculoskeletal: _Min___ ROM Transfers __walker and wheel chair ___ Assistive Device Used  ____ Level of Assistance  Skin Integumentary:   [x] Intact   [] Not Intact   __________Preventative Measures  Details__ask patient to turn every 2 hours in bed, while in bed float heels __________________________________________________________  Pain: [x] Controlled   [] Not Controlled   Pain Meds:   [] Scheduled   [x] PRN        Interdisciplinary Team Goals:     1. Discipline  Physical Therapy    Goal  Patient will be able to negotiate 3 steps and a threshold step with CGA for safe entry/exit of home prior to d/c    Barrier  Decreased sensation, strength, endurance, increased fear of falling, decreased balance    Intervention  Therex, theract, NM re-ed    Goal written by:   Yasmin Kay, PT, DPT     2.  Discipline  Occupational Therapy    Goal  Pt will perform LB dressing with S.    Barrier  Decreased BLE balance, strength, and fear of falling    Intervention  Therex, Theact, Self Care Retraining     Goal written by:  Glenda Mast, MSOTR/L      3. Discipline  Speech Therapy    Goal      Barrier      Intervention      Goal written by:       4. Discipline  Nursing    Goal  By discharged, pt will remain free of UTI, maintain rdz catheter care. Barrier  Acute urinary retention, immobilization,     Intervention  Continuing sarina care, encourage fluid intake, educated S&S of UTI. Goal written by:  Alana Nguyen      5. Discipline  Clinical Psychology    Goal  Maximize treatment effort and maintain mood stability for same    Barrier  Stress with illness/recovery and increased dependency    Intervention  Support , as needed    Goal written by:  Veverly Essex, PhD         Disposition / Discharge Planning:      Follow-up services:  [x] Physical Therapy             [x] Occupational Therapy       [] Speech Therapy           [] Skilled Nursing      [] Medical Social Worker   [] Aide        [] Outpatient      [] vs   [x] Home Health  [] vs       [x] to progress to outpatient       [] with 24-hour supervision       [] with 24-hour assistance   [] [place]   [facility] recommendations:  Tub bench and bedside freya, RASHEED, w/c   Estimated discharge date:  9/3/2022   Discharge Location:  [x] Home  [] versus    [] [place]    [] [address] [facility]   [] Other:           Electronic Signatures:      Signature Date Signed   Physical Therapist    Gabino Olvera, PT, DPT  8/30/2022   Occupational Therapist    Leanne Soto   8/30/22   Speech Therapist         Nursing    Alana Nguyen, RN  8/30/2022   Clinical Psychologist    Veverly Essex, PhD  8/30/2022    Physician    Caryle Hauser, PT    8/30/2022       Maicol Boudreaux, MSW  8/30/2022     Opportunity to share with family/caregiver[] YES [] NO    Relationship to patient____________________________________________________      The above information has been reviewed with the patient in a language that they can understand. Opportunity for comments and questions has been provided and a signed attestation has been scanned into the \"media tab\" of the EMR.       Patient Signature: ______________________________________________________    Date Signed: __________________________________________________________

## 2022-08-31 LAB
AMORPH CRY URNS QL MICRO: ABNORMAL
APPEARANCE UR: CLEAR
BACTERIA URNS QL MICRO: ABNORMAL /HPF
BILIRUB UR QL: NEGATIVE
COLOR UR: YELLOW
EPITH CASTS URNS QL MICRO: ABNORMAL /LPF (ref 0–5)
GLUCOSE BLD STRIP.AUTO-MCNC: 107 MG/DL (ref 70–110)
GLUCOSE BLD STRIP.AUTO-MCNC: 112 MG/DL (ref 70–110)
GLUCOSE BLD STRIP.AUTO-MCNC: 125 MG/DL (ref 70–110)
GLUCOSE BLD STRIP.AUTO-MCNC: 149 MG/DL (ref 70–110)
GLUCOSE UR STRIP.AUTO-MCNC: NEGATIVE MG/DL
HGB UR QL STRIP: ABNORMAL
KETONES UR QL STRIP.AUTO: NEGATIVE MG/DL
LEUKOCYTE ESTERASE UR QL STRIP.AUTO: ABNORMAL
NITRITE UR QL STRIP.AUTO: POSITIVE
PH UR STRIP: 7.5 [PH] (ref 5–8)
PROT UR STRIP-MCNC: NEGATIVE MG/DL
RBC #/AREA URNS HPF: ABNORMAL /HPF (ref 0–5)
SP GR UR REFRACTOMETRY: 1.01 (ref 1–1.03)
UROBILINOGEN UR QL STRIP.AUTO: 0.2 EU/DL (ref 0.2–1)
WBC URNS QL MICRO: ABNORMAL /HPF (ref 0–4)

## 2022-08-31 PROCEDURE — 97110 THERAPEUTIC EXERCISES: CPT

## 2022-08-31 PROCEDURE — 97535 SELF CARE MNGMENT TRAINING: CPT

## 2022-08-31 PROCEDURE — 99232 SBSQ HOSP IP/OBS MODERATE 35: CPT | Performed by: INTERNAL MEDICINE

## 2022-08-31 PROCEDURE — 74011250637 HC RX REV CODE- 250/637: Performed by: INTERNAL MEDICINE

## 2022-08-31 PROCEDURE — 97112 NEUROMUSCULAR REEDUCATION: CPT

## 2022-08-31 PROCEDURE — 97530 THERAPEUTIC ACTIVITIES: CPT

## 2022-08-31 PROCEDURE — 74011250637 HC RX REV CODE- 250/637: Performed by: NURSE PRACTITIONER

## 2022-08-31 PROCEDURE — 51798 US URINE CAPACITY MEASURE: CPT

## 2022-08-31 PROCEDURE — 81001 URINALYSIS AUTO W/SCOPE: CPT

## 2022-08-31 PROCEDURE — 82962 GLUCOSE BLOOD TEST: CPT

## 2022-08-31 PROCEDURE — 97116 GAIT TRAINING THERAPY: CPT

## 2022-08-31 PROCEDURE — 65310000000 HC RM PRIVATE REHAB

## 2022-08-31 PROCEDURE — 2709999900 HC NON-CHARGEABLE SUPPLY

## 2022-08-31 PROCEDURE — 74011250637 HC RX REV CODE- 250/637: Performed by: EMERGENCY MEDICINE

## 2022-08-31 PROCEDURE — 74011250637 HC RX REV CODE- 250/637: Performed by: HOSPITALIST

## 2022-08-31 RX ORDER — TAMSULOSIN HYDROCHLORIDE 0.4 MG/1
0.4 CAPSULE ORAL
Status: COMPLETED | OUTPATIENT
Start: 2022-08-31 | End: 2022-08-31

## 2022-08-31 RX ORDER — TAMSULOSIN HYDROCHLORIDE 0.4 MG/1
0.8 CAPSULE ORAL DAILY
Status: DISCONTINUED | OUTPATIENT
Start: 2022-09-01 | End: 2022-09-03 | Stop reason: HOSPADM

## 2022-08-31 RX ADMIN — DICLOFENAC SODIUM 2 G: 10 GEL TOPICAL at 09:55

## 2022-08-31 RX ADMIN — TIMOLOL MALEATE 1 DROP: 2.5 SOLUTION OPHTHALMIC at 09:55

## 2022-08-31 RX ADMIN — HYDROCHLOROTHIAZIDE 12.5 MG: 25 TABLET ORAL at 09:11

## 2022-08-31 RX ADMIN — ATORVASTATIN CALCIUM 40 MG: 40 TABLET, FILM COATED ORAL at 21:08

## 2022-08-31 RX ADMIN — Medication 1 TABLET: at 17:39

## 2022-08-31 RX ADMIN — TAMSULOSIN HYDROCHLORIDE 0.4 MG: 0.4 CAPSULE ORAL at 12:49

## 2022-08-31 RX ADMIN — Medication 1 TABLET: at 09:11

## 2022-08-31 RX ADMIN — FLUTICASONE PROPIONATE 2 SPRAY: 50 SPRAY, METERED NASAL at 21:08

## 2022-08-31 RX ADMIN — LOSARTAN POTASSIUM 50 MG: 50 TABLET, FILM COATED ORAL at 09:12

## 2022-08-31 RX ADMIN — METFORMIN HYDROCHLORIDE 500 MG: 500 TABLET ORAL at 09:11

## 2022-08-31 RX ADMIN — TIMOLOL MALEATE 1 DROP: 2.5 SOLUTION OPHTHALMIC at 17:56

## 2022-08-31 RX ADMIN — TAMSULOSIN HYDROCHLORIDE 0.4 MG: 0.4 CAPSULE ORAL at 09:11

## 2022-08-31 NOTE — INTERDISCIPLINARY ROUNDS
[facility] PHYSICAL REHABILITATION  [address] [place] [address], [address]    INPATIENT REHABILITATION  PRE-TEAM CONFERENCE SUMMARY     Date of Conference: 8/31/2022    Patient Information:        Name: Morris Haro Age / Sex: 78 y.o. / female   CSN: [id number] MRN: [id number]   Admit Date: 8/23/2022 Length of Stay: 8 days     Primary Rehabilitation Diagnosis  1. Impaired Mobility and ADLs  2. Acute Transverse Myelitis     Comorbidities  Patient Active Problem List   Diagnosis Code    History of uterine cancer Z85.42    Hydronephrosis N13.30    Idiopathic transverse myelitis (HCC) G37.3         Therapy:     FIM SCORES Initial Assessment Weekly Progress Assessment 8/31/2022   Eating Functional Level: 6 (set-up)  Comments: Self-feeding (Mod I) following set-up of lunch tray; pt able to open lids and small containers. 6   Swallowing     Grooming 5 (setup EOB)     Bathing 4 (4 without LHS and 5 with LHS)     Upper Body Dressing Functional Level: 5  Items Applied/Steps Completed: Pullover (4 steps) (setup EOB)     Lower Body Dressing Functional Level: 5 (setup EOB)  Items Applied/Steps Completed: Sock, left (1 step), Sock, right (1 step), Underpants (3 steps), Elastic waist pants (3 steps)  Comments:  (pt managed pants over hips long sitting in bed)     Toileting Functional Level: 5 (setup)  Comments: Patient demonstrating ability to weight shift from side to side while seated on commode in order to pull shorts and brief down over hips; pt requiring (Min A) in order to pull shorts down completely as they were partially caught underneath the back of her legs and 2/2 rdz catheter. Pt requiring (Min A) for adjusting brief/ shorts up in back at conclusion of toileting task. Pt performed hygiene (set-up) level; pt having BM at this time.   5   Bladder 0 0   Bowel  0 0   Toilet Transfer [place] Toilet Transfer Score: 5 (based on sqaut pivot from EOB to w/c)  Comments: Squat pivot transfer (CGA/ Min A) w/c <-> elevated seat over toilet; gym bathroom     Tub/Shower Transfer [place] Tub or Shower Type: Tub/Shower combination  Tub/Shower Transfer Score: 5 (vcs for ant weight shift and clearing sacrum)       Comprehension Primary Mode of Comprehension: Auditory  Score: 6       Expression Primary Mode of Expression: Verbal  Score: 6     Social Interaction Score: 4     Problem Solving Score: 4     Memory Score: 5       FIM SCORES Initial Assessment Weekly Progress Assessment 8/31/2022   Bed/Chair/Wheelchair Transfers Transfer Type: Other  Other: squat pivot  Transfer Assistance : 1 (Total assistance) (unable to achieve stand for stand step,, was able to perform lateral transfer with minimal physical assistance and maximal verbal cuing utilizing squat pivot technique with multiple attempts at clearing sacrum as pt does not maintain sacral clearance.)  Sit to Stand Assistance: Unable at this time  Car Transfers:  (CGA using RW, cues for safe hand placement initially.)  Car Type: car transfer simulator Transfer Type:  Other (stand step with RW)  Transfer Assistance : 4 (Contact guard assistance)  Sit to Stand Assistance: Contact guard assistance (CG/SBA with cues for safe hand placement)  Car Transfers:  (CGA using RW, cues for safe hand placement initially.)  Car Type: car transfer simulator   Bed Mobility Rolling Right 6 (Modified independent)   Rolling Left 6 (Modified independent)   Supine to Sit 5 (Supervision)   Sit to Stand Unable at this time   Sit to Supine 5 (Supervision)    Rolling Right   6 (Modified independent)Mod I   Rolling Left   6 (Modified independent)Mod I   Supine to Sit   6 (Modified independent)Mod I   Sit to Stand   Contact guard assistance (CG/SBA with cues for safe hand placement)   Sit to Supine   6 (Modified independent)Mod I      Locomotion (W/C) Able to Propel (ft): 186 feet  Functional Level: 4  Curbs/Ramps Assist Required (FIM Score): 0 (Not tested)  Wheelchair Setup Assist Required : 3 (Moderate assistance)  Wheelchair Management: Manages left brake, Manages right brake Function 6  Setup Assistance  6 (Modified independent)      Locomotion (W/C distance)   190 feet   Locomotion (Walk)  (Unable at this time) 4 (Contact guard assistance)  Walker, rolling;Gait belt   Locomotion (Walk dist.) 0 Feet (ft) 22 Feet (ft) (22 ft, 34 ft,)   Steps/Stairs Steps/Stairs Ambulated (#): 0  Level of Assist :  (Unable at this time)  Rail Use: Both (2 rails progressing to 1 railing using sidestepping method (leading right LE up step))  2-3 stairs with min A, using one railing with sidestepping method         Nursing:     Neuro: AAA&O X4 , Patient is oriented X 4   Respiratory:   [x] WNL   [] O2 LPM:   Other:  Peripheral Vascular:   [] TEDS present   [x] Edema present ____ Grade   Cardiac:   [] WNL   [x] Other heart rate elevated   Genitourinary:   [] continent   [] incontinent   [x] rdz  Abdominal __8/29/22_____ LBM  GI: _Regualr 4 Carb ______ Diet _Thin___ Liquids _____ tube feeds  Musculoskeletal: _Min___ ROM Transfers __walker and wheel chair ___ Assistive Device Used  ____ Level of Assistance  Skin Integumentary:   [x] Intact   [] Not Intact   __________Preventative Measures  Details__ask patient to turn every 2 hours in bed, while in bed float heels __________________________________________________________  Pain: [x] Controlled   [] Not Controlled   Pain Meds:   [] Scheduled   [x] PRN        Interdisciplinary Team Goals:     1. Discipline  Physical Therapy    Goal  Patient will be able to negotiate 3 steps and a threshold step with CGA for safe entry/exit of home prior to d/c    Barrier  Decreased sensation, strength, endurance, increased fear of falling, decreased balance    Intervention  Therex, theract, NM re-ed    Goal written by:   Jean-Paul Francis, PT, DPT     2.  Discipline  Occupational Therapy    Goal  Pt will perform LB dressing with S.    Barrier  Decreased BLE balance, strength, and fear of falling Intervention  Therex, Theact, Self Care Retraining     Goal written by:  Jabier Jacques, MSOTR/L      3. Discipline  Speech Therapy    Goal      Barrier      Intervention      Goal written by:       4. Discipline  Nursing    Goal  By discharged, pt will remain free of UTI, maintain rdz catheter care. Barrier  Acute urinary retention, immobilization,     Intervention  Continuing sarina care, encourage fluid intake, educated S&S of UTI. Goal written by:  Laura Velasquez      5. Discipline  Clinical Psychology    Goal  Maximize treatment effort and maintain mood stability for same    Barrier  Stress with illness/recovery and increased dependency    Intervention  Support , as needed    Goal written by:  Viola Dorantes, PhD         Disposition / Discharge Planning:      Follow-up services:  [x] Physical Therapy             [x] Occupational Therapy       [] Speech Therapy           [] Skilled Nursing      [] Medical Social Worker   [] Aide        [] Outpatient      [] vs   [x] Home Health  [] vs       [x] to progress to outpatient       [] with 24-hour supervision       [] with 24-hour assistance   [] [place]   [facility] recommendations:  Tub bench and bedside commode, RW, w/c   Estimated discharge date:  9/3/2022   Discharge Location:  [x] Home  [] versus    [] [facility]    [] [address] [place] [address]   [] Other:           Electronic Signatures:      Signature Date Signed   Physical Therapist    Mihir Kennedy PT, DPT  8/30/2022   Occupational Therapist    Ne Conner   8/30/22   Speech Therapist         Nursing    Laura Velasquez, RN  8/30/2022   Clinical Psychologist    Viola Dorantes, PhD  8/30/2022    Physician    Dianna Joyce MD    8/30/2022       ALEX Rosen  8/30/2022     Opportunity to share with family/caregiver[] YES [] NO    Relationship to patient____________________________________________________      The above information has been reviewed with the patient in a language that they can understand. Opportunity for comments and questions has been provided and a signed attestation has been scanned into the \"media tab\" of the EMR.       Patient Signature: ______________________________________________________    Date Signed: __________________________________________________________

## 2022-08-31 NOTE — ROUTINE PROCESS
SHIFT CHANGE NOTE FOR [facility]    Bedside and Verbal shift change report given to Robert Funk RN (oncoming nurse) by Sarah Daley RN (offgoing nurse). Report included the following information SBAR, Kardex, MAR and Recent Results.     Situation:   Code Status: Full Code   Hospital Day: 8   Problem List:   Hospital Problems  Date Reviewed: 11/18/2019            Codes Class Noted POA    Idiopathic transverse myelitis ([facility] ICD-10-CM: G37.3  ICD-9-CM: 341.22  8/23/2022 Unknown         Background:   Past Medical History:   Past Medical History:   Diagnosis Date    Allergic rhinitis     Cancer ([facility] 1995    Uterine Cancer (radiation, surgery)    Diverticulosis     GERD (gastroesophageal reflux disease)     Hypercholesteremia     Hypertension     Steatosis of liver 2015        Assessment:  Changes in Assessment throughout shift: No change to previous assessment    Patient has a central line: no Reasons if yes: n/a  Insertion date: n/a Last dressing date: n/a  Patient has Rdz Cath: yes Reasons if yes:  retention   Insertion date: 8/26 Shift rdz care completed: yes    Last Vitals:     Vitals:    08/30/22 0839 08/30/22 0913 08/30/22 1720 08/30/22 2056   BP: 137/82 127/75 114/84 120/76   Pulse: (!) 113 100 92 90   Resp: 16 16 16 19   Temp: 97.5 °F (36.4 °C) 97.5 °F (36.4 °C) 97.8 °F (36.6 °C) 98 °F (36.7 °C)   SpO2: 96% 98% 97% 96%   Weight:       Height:           PAIN    Pain Assessment    Pain Intensity 1: 0 (08/31/22 0429) Pain Intensity 1: 2 (12/29/14 1105)      Pain Location 1: Abdomen      Pain Intervention(s) 1: Medication (see MAR)  Patient Stated Pain Goal: 0 Patient Stated Pain Goal: 0  Intervention effective: no  Other actions taken for pain:      Skin Assessment  Skin color Skin Color: Appropriate for ethnicity  Condition/Temperature Skin Condition/Temp: Dry, Warm  Integrity Skin Integrity: Excoriation (groin area)  Turgor    Weekly Pressure Ulcer Documentation  Pressure  Injury Documentation: No Pressure Injury Noted-Pressure Ulcer Prevention Initiated  Wound Prevention & Protection Methods  Orientation of wound Orientation of Wound Prevention: Posterior  Location of Prevention Location of Wound Prevention: Buttocks, Sacrum/Coccyx  Dressing Present Dressing Present : No  Dressing Status    Wound Offloading Wound Offloading (Prevention Methods): Bed, pressure reduction mattress    Wound Prevention Checklist  Precautions: Fall    []  Heel prevention boots placed on patient    []  Patient turned q2h during shift    []  [place] Order Set ordered    []  Patient on [place] bed/Specialty bed    []  Each Wound is documented during shift (Stage, Color, drainage, odor, measurements, and dressings)    [x]  Dual skin checks done at bedside during shift report with Mimi Kennedy RN    INTAKE/OUPUT  Date 08/30/22 0700 - 08/31/22 0659 08/31/22 0700 - 09/01/22 0659   Shift 8425-5846 3253-1901 24 Hour Total 7574-3227 0422-3449 24 Hour Total   INTAKE   P.O. 1370  1370        P.O. 1370  1370      Other 0  0        Irrigation Volume Input (mL) (Urinary Catheter 08/26/22 Valle) 0  0      Shift Total(mL/kg) 1803(58.9)  3471(79.1)      OUTPUT   Urine(mL/kg/hr) 2586(9) 250 2390        Urine Voided 880  880        Urine Occurrence(s) 3 x 1 x 4 x        Urine Output (mL) (Urinary Catheter 08/26/22 Valle) 3622 619 5360      Emesis/NG output           Emesis Occurrence(s)  0 x 0 x      Stool           Stool Occurrence(s) 2 x 1 x 3 x      Shift Total(mL/kg) 6087(35.2) 250(2.8) 7567(66)      NET -770 250 -1021      Weight (kg) 88.5 88.5 88.5 88.5 88.5 88.5         Recommendations:  Patient needs and requests: Toileting    Pending tests/procedures: routine labs     Functional Level/Equipment: Partial (one person) / Bed (comment)    Fall Precautions:   Fall risk precautions were reinforced with the patient; she was instructed to call for help prior to getting up.  The following fall risk precautions were continued: bed/ chair alarms, door signage, yellow bracelet and socks as well as update of the Katharine Gong tool in the patient's room. Jose J Score: 4    HEALS Safety Check    A safety check occurred in the patient's room between off going nurse and oncoming nurse listed above. The safety check included the below items  Area Items   H  High Alert Medications Verify all high alert medication drips (heparin, PCA, etc.)   E  Equipment Suction is set up for ALL patients (with ryan)  Red plugs utilized for all equipment (IV pumps, etc.)  WOWs wiped down at end of shift. Room stocked with oxygen, suction, and other unit-specific supplies   A  Alarms Bed alarm is set for fall risk patients  Ensure chair alarm is in place and activated if patient is up in a chair   L  Lines Check IV for any infiltration  Valle bag is empty if patient has a Valle   Tubing and IV bags are labeled   S  Safety   Room is clean, patient is clean, and equipment is clean. Hallways are clear from equipment besides carts. Fall bracelet on for fall risk patients  Ensure room is clear and free of clutter  Suction is set up for ALL patients (with ryan)  Hallways are clear from equipment besides carts.    Isolation precautions followed, supplies available outside room, sign posted     Isaías Rick RN

## 2022-08-31 NOTE — PROGRESS NOTES
[facility]    Bedside and Verbal shift change report given to Vilma Hernandez (oncoming nurse) by Kandis Morales RN (offgoing nurse). Report included the following information SBAR, Kardex, MAR and Recent Results.     Situation:   Code Status: Full Code   Hospital Day: 8   Problem List:   Hospital Problems  Date Reviewed: 11/18/2019            Codes Class Noted POA    [facility] ICD-10-CM: G37.3  ICD-9-CM: 341.22  8/23/2022 Unknown           Background:   Past Medical History:   Past Medical History:   Diagnosis Date    Allergic rhinitis     Cancer ([facility] 1995    Uterine Cancer (radiation, surgery)    Diverticulosis     GERD (gastroesophageal reflux disease)     Hypercholesteremia     Hypertension     Steatosis of liver 2015        Assessment:  Changes in Assessment throughout shift: Changes documented below    Last Vitals:     Vitals:    08/30/22 1720 08/30/22 2056 08/31/22 0909 08/31/22 1548   BP: 114/84 120/76 (!) 158/69 99/62   Pulse: 92 90 80 76   Resp: 16 19 20 16   Temp: 97.8 °F (36.6 °C) 98 °F (36.7 °C) 97.8 °F (36.6 °C) 96.9 °F (36.1 °C)   SpO2: 97% 96% 94% 94%   Weight:       Height:           PAIN    Pain Assessment    Pain Intensity 1: 0 (08/31/22 1548) Pain Intensity 1: 2 (12/29/14 1105)      Pain Location 1: Abdomen      Pain Intervention(s) 1: Medication (see MAR)  Patient Stated Pain Goal: 0 Patient Stated Pain Goal: 0  Intervention effective: yes  Other actions taken for pain:      Skin Assessment  Skin color Skin Color: Appropriate for ethnicity  Condition/Temperature Skin Condition/Temp: Warm, Dry  Integrity Skin Integrity: Excoriation  Turgor    Weekly Pressure Ulcer Documentation  Pressure  Injury Documentation: No Pressure Injury Noted-Pressure Ulcer Prevention Initiated  Wound Prevention & Protection Methods  Orientation of wound Orientation of Wound Prevention: Posterior  Location of Prevention Location of Wound Prevention: Buttocks  Dressing Present Dressing Present : No  Dressing Status    Wound Offloading Wound Offloading (Prevention Methods): Bed, pressure reduction mattress, Elevate heels, Chair cushion, Repositioning, Wheelchair    Wound Prevention Checklist  Precautions: Fall    []  Heel prevention boots placed on patient    []  Patient turned q2h during shift    []  [place] Order Set ordered    []  Patient on [place] bed/Specialty bed    []  Each Wound is documented during shift (Stage, Color, drainage, odor, measurements, and dressings)    [x]  Dual skin checks done at bedside during shift report with Sunshine Redmond RN    INTAKE/OUPUT  Date 08/30/22 0700 - 08/31/22 0659 08/31/22 0700 - 09/01/22 0659   Shift 0119-9845 1501-2986 24 Hour Total 4491-4380 6286-1336 24 Hour Total   INTAKE   P.O. 1370  1370 840  840     P. O. 1370  1370 840  840   Other 0  0        Irrigation Volume Input (mL) ([REMOVED] Urinary Catheter 08/26/22 Valle) 0  0      Shift Total(mL/kg) 1370(15.5)  1370(15.5) 840(9.5)  840(9.5)   OUTPUT   Urine(mL/kg/hr) 3639(8) 600(0.6) 2740(1.3) 637  637     Urine    17  17     Urine Voided 880  880 620  620     Urine Occurrence(s) 3 x 1 x 4 x 1 x  1 x     Urine Output (mL) ([REMOVED] Urinary Catheter 08/26/22 [place]) 4762 366 1004      Emesis/NG output           Emesis Occurrence(s)  0 x 0 x      Stool           Stool Occurrence(s) 2 x 1 x 3 x 2 x  2 x   Shift Total(mL/kg) 9872(06.3) 600(6.8) 2740(31) 637(7.2)  637(7.2)   NET -084 -600 -1370 203  203   Weight (kg) 88.5 88.5 88.5 88.5 88.5 88.5       Recommendations:  Patient needs and requests: toileting    Pending tests/procedures: routine labs     Functional Level/Equipment:   / Wheelchair;Walker    Fall Precautions:   Fall risk precautions were reinforced with the patient; she was instructed to call for help prior to getting up.  The following fall risk precautions were continued: bed/ chair alarms, door signage, yellow bracelet and socks as well as update of the Svetlana Ríosd tool in the patient's room. Jose J Score: 4    HEALS Safety Check    A safety check occurred in the patient's room between off going nurse and oncoming nurse listed above. The safety check included the below items  Area Items   H  High Alert Medications Verify all high alert medication drips (heparin, PCA, etc.)   E  Equipment Suction is set up for ALL patients (with ryan)  Red plugs utilized for all equipment (IV pumps, etc.)  WOWs wiped down at end of shift. Room stocked with oxygen, suction, and other unit-specific supplies   A  Alarms Bed alarm is set for fall risk patients  Ensure chair alarm is in place and activated if patient is up in a chair   L  Lines Check IV for any infiltration  Valle bag is empty if patient has a Valle   Tubing and IV bags are labeled   S  Safety   Room is clean, patient is clean, and equipment is clean. Hallways are clear from equipment besides carts. Fall bracelet on for fall risk patients  Ensure room is clear and free of clutter  Suction is set up for ALL patients (with ryan)  Hallways are clear from equipment besides carts.    Isolation precautions followed, supplies available outside room, sign posted     Alonso Campos RN

## 2022-08-31 NOTE — PROGRESS NOTES
Problem: Self Care Deficits Care Plan (Adult)  Goal: *Therapy Goal (Edit Goal, Insert Text)  Description: Occupational Therapy Goals   Long Term Goals  Initiated 2022 and to be accomplished within 2-3 week(s) 2022  1. Pt will perform self-feeding with Deb. 2. Pt will perform grooming with Deb. 3. Pt will perform UB bathing with Deb  4. Pt will perform LB bathing with Deb. 5. Pt will perform tub/shower transfer with Deb. 6. Pt will perform UB dressing with Deb. 7. Pt will perform LB dressing with Deb. 8. Pt will perform toileting task with Deb. 9. Pt will perform toilet transfer with Deb. 10.  Pt will perform an IADL task with good safety and Deb. Short Term Goals   Initiated 2022 and to be accomplished within 7 day(s) 2022; updated 22  1. Pt will perform self-feeding with S GM 22  2. Pt will perform grooming with S. GM 22  3. Pt will perform UB bathing with S.  4. Pt will perform LB bathing with S  5. Pt will perform tub/shower transfer with S. GM 22  6. Pt will perform UB dressing with S.  7. Pt will perform LB dressing with S  8. Pt will perform toileting task with S.  9. Pt will perform toilet transfer with S.GM 22         Outcome: Progressing Towards Goal  Goal: Interventions  Outcome: Progressing Towards Goal   Occupational Therapy TREATMENT    Patient: Joan Jaramillo   78 y.o. Patient identified with name and : yes    Date: 2022    First Tx Session  Time In: 1033  Time Out: 5568    Diagnosis: Idiopathic transverse myelitis ([facility] [address] [G37.3]   Precautions: Fall precautions, Aspiration precautions, Safety precautions  Chart, occupational therapy assessment, plan of care, and goals were reviewed. Pain:  Pt reports 0/10 pain or discomfort prior to treatment. Pt reports 0/10 pain or discomfort post treatment. Intervention Provided: No complaints of pain at onset, during, or at conclusion of skilled occupational therapy session. SUBJECTIVE:   Patient stated I feel like I need to have a BM and \"they will be taking my catheter out later today. \"    OBJECTIVE DATA SUMMARY:     THERAPEUTIC ACTIVITY Daily Assessment    Patient participated in RUE/ LUE [address] and light cognitive tabletop activity (CHRISTIANO card game) with therapist. Performed for FM skills (tip pinch; in-hand manipulation), bi-manual task, and light cognitive activity (problem solving) for carryover to self-cares. Wheelchair mobility performed (Mod I) for propelling over level surfaces from pt's room <-> therapy gym; Mod I for brake management. Pt uses BUE/ BLE to propel w/c forward. Performed for increased functional independence while navigating and accessing facility environment. Functional squat pivot transfer performed SBA (w/c <-> elevated seat over toilet); grab bar  to address toileting self-care needs; pt having small BM at this time. Verbal cues for hand placement and body positioning. THERAPEUTIC EXERCISE Daily Assessment    RUE/ LUE there ex performed using arm bike (moderate resistance) 15 min duration for range of motion, strengthening, and endurance for increased (I) with ADL's and functional transfers. Patient participated in Acheive CCA there ex using 2 lb weighted bar during beach ball bump back activity with therapist. Verbal cues for hand placement on bar and for body positioning. Performed for anterior weight shift, core strengthening, and RUE/ LUE strengthening for increased (I) with self-cares and transfers. Good participation. RUE/ LUE (orange) theraband exercises performed (15 reps x1) for shoulder flexion, shoulder abduction/ adduction, chest pull, diagonals, shoulder internal/ external rotation, and elbow flexion/ extension. Verbal cues with demonstration for hand positioning and technique. Intermittent rest breaks due to fatigue. HEP handout provided. Performed for ROM and strengthening for increased (I) with ADL's.       NMRE Daily Assessment    Patient participated in static/ dynamic standing activity while at parallel bar in therapy gym. Patient stood (CGA; gait belt) facing parallel bar while using bilateral hands for steadying support on bar. Patient performed side to side weight shifting (from right to left lower extremity), marches, small side step motion, shoulder shrugs, and scapular retraction/ protraction; multiple repetitions. Pt stood for 2 bouts of ~4.5 min intervals; pt requiring seated rest breaks due to fatigue. GROOMING Daily Assessment    Functional Level: 5 (set-up/ Mod I (w/c at sink))  Tasks Completed by Patient: Washed face; Washed hands;Brushed teeth  Comments: Patient performed self-care grooming tasks (set-up/ Mod I) w/c level at sink. Oral hygiene: 5 set-up/ Mod I     TOILETING Daily Assessment    Functional Level: 5 (SBA)  Comments: SBA for CM; set-up for hygiene. Patient demonstrating ability to weight shift from side to side while seated on commode in order to pull shorts and brief down over hips; pt stood using grab bar next to commode to pull brief/ shorts over hips to waist. Patient had small BM at this time. Nursing manages rdz catheter. MOBILITY/TRANSFERS Daily Assessment    Toilet Transfer Score: 5 (Squat pivot transfer SBA (w/c <-> elevated seat over toilet); grab bar)  Comments: Squat pivot transfer SBA (w/c <-> elevated seat over toilet); grab bar            ASSESSMENT:  Today's skilled occupational therapy session focused on RUE/ LUE there ex (ROM; strengthening), RUE/ LUE there act (beach ball bump back), [facility] tabletop activity, NM re-edu (standing balance/ tolerance), and functional transfer training for improved (I) with toileting self-care. Patient will continue to benefit from skilled occupational therapy interventions to address functional (I) with ADL's and transfers for return to PLOF.    Progression toward goals:  []          Improving appropriately and progressing toward goals  [x] Improving slowly and progressing toward goals  []          Not making progress toward goals and plan of care will be adjusted     PLAN:  Patient continues to benefit from skilled intervention to address the above impairments. Continue treatment per established plan of care. Discharge Recommendations:  Home Health occupational therapy with family assist  Further Equipment Recommendations for Discharge:  bedside commode, gait belt, transfer bench      Activity Tolerance:  Fair+; intermittent rest breaks due to fatigue  Estimated LOS: 9/3/2022    Please refer to the flowsheet for vital signs taken during this treatment. After treatment:   []  Patient left in no apparent distress sitting up in chair   [x]  Patient left in no apparent distress in bed with needs met  [x]  Call bell left within reach  [x]  Nursing notified  []  Caregiver present  [x]  Bed alarm activated    COMMUNICATION/EDUCATION:   [] Home safety education was provided and the patient/caregiver indicated understanding. [x] Patient/family have participated as able in goal setting and plan of care. [x] Patient/family agree to work toward stated goals and plan of care. [] Patient understands intent and goals of therapy, but is neutral about his/her participation. [] Patient is unable to participate in goal setting and plan of care.     [address],

## 2022-08-31 NOTE — PROGRESS NOTES
[address]  [address] [place] [address], [address]     INPATIENT REHABILITATION  DAILY PROGRESS NOTE     Date: 8/31/2022    Name: Ariane Napier Age / Sex: 78 y.o. / female   CSN: [id number] MRN: [id number]   Admit Date: 8/23/2022 Length of Stay: 8 days     Primary Rehabilitation Diagnosis: Impaired Mobility and ADLs secondary to Acute Transverse Myelitis       Subjective:     Patient is resting in bed comfortably. No new issues overnight. No back pain. No tingling or numbness. Denies any dysuria currently. No abdominal pain. No shortness of breath or cough. Valle was removed at 1 pm and waiting to void. Pain Control: no current joint or muscle symptoms, essentially pain-free      Objective:     Vital Signs:  Patient Vitals for the past 24 hrs:   BP Temp Pulse Resp SpO2   08/31/22 0909 (!) 158/69 97.8 °F (36.6 °C) 80 20 94 %   08/30/22 2056 120/76 98 °F (36.7 °C) 90 19 96 %   08/30/22 1720 114/84 97.8 °F (36.6 °C) 92 16 97 %        Physical Examination:  GENERAL SURVEY: Patient is awake, alert, oriented x 3,  not in acute respiratory distress. CHEST/LUNGS: symmetrical chest expansion, good air entry, clear breath sounds  HEART: adynamic precordium, good S1 S2, no S3, regular rhythm, no murmurs  ABDOMEN: flat, bowel sounds appreciated, soft, non-tender, EXTREMITIES: No pedal edema. No erythema or edema of both knees. NEUROLOGICAL EXAM: Awake, no facial droop, follows verbal commands appropriately, motor strength 4-5 out of 5 in both lower extremities and 5-5 in both upper extremities, sensation touch normal currently in both upper extremities, no tremors noted.     Functional Progress:    OCCUPATIONAL THERAPY    ON ADMISSION MOST RECENT   Eating  Functional Level: 6 (set-up)   Eating  Functional Level: 6 (set-up)     Grooming  Functional Level: 5 (setup EOB)   Grooming  Functional Level: 5     Bathing  Functional Level: 4 (4 without LHS and 5 with LHS) Bathing  Functional Level: 5     Upper Body Dressing  Functional Level: 5   Upper Body Dressing  Functional Level: 5 (setup)     Lower Body Dressing  Functional Level: 5 (setup EOB)   Lower Body Dressing  Functional Level: 4 (asst for clothing management and vcs to manage catheter)     Toileting  Functional Level: 5 (setup)   Toileting  Functional Level: 4 (Min A)     Toilet Transfers  Toilet Transfer Score: 5 (based on sqaut pivot from EOB to w/c)   Toilet Transfers  Toilet Transfer Score: 4 (Squat pivot transfer (CGA/ Min A) w/c <-> elevated seat over toilet; therapy gym bathroom (gait belt for safety))     Tub /Shower Transfers  Tub/Shower Transfer Score: 5 (vcs for ant weight shift and clearing sacrum)   Tub/Shower Transfers  Tub/Shower Transfer Score: 5 (pt used squat pivot method an declned using RW)       Legend:   7 - Independent   6 - Modified Independent   5 - Standby Assistance / Supervision / Set-up   4 - Minimum Assistance / Contact Guard Assistance   3 - Moderate Assistance   2 - Maximum Assistance   1 - Total Assistance / Dependent         Current Medications:  Current Facility-Administered Medications   Medication Dose Route Frequency    [START ON 9/1/2022] tamsulosin (FLOMAX) capsule 0.8 mg  0.8 mg Oral DAILY    diclofenac (VOLTAREN) 1 % topical gel 2 g  2 g Topical QID PRN    pneumococcal 23-valent (PNEUMOVAX 23) injection 0.5 mL  0.5 mL IntraMUSCular PRIOR TO DISCHARGE    atorvastatin (LIPITOR) tablet 40 mg  40 mg Oral QHS    calcium-vitamin D 600 mg-5 mcg (200 unit) per tablet 1 Tablet  1 Tablet Oral BID    cetirizine (ZYRTEC) tablet 5 mg  5 mg Oral DAILY PRN    losartan (COZAAR) tablet 50 mg  50 mg Oral DAILY    metFORMIN (GLUCOPHAGE) tablet 500 mg  500 mg Oral DAILY WITH BREAKFAST    hydroCHLOROthiazide (HYDRODIURIL) tablet 12.5 mg  12.5 mg Oral DAILY    [Held by provider] carvediloL (COREG) tablet 6.25 mg  6.25 mg Oral Q12H    timolol (TIMOPTIC) 0.25% ophthalmic solution  1 Drop Both Eyes BID fluticasone propionate (FLONASE) 50 mcg/actuation nasal spray 2 Spray  2 Spray Both Nostrils DAILY    insulin lispro (HUMALOG) injection   SubCUTAneous AC&HS    glucose chewable tablet 16 g  4 Tablet Oral PRN    glucagon (GLUCAGEN) injection 1 mg  1 mg IntraMUSCular PRN    dextrose 10% infusion 0-250 mL  0-250 mL IntraVENous PRN    [Held by provider] docusate sodium (COLACE) capsule 100 mg  100 mg Oral BID    bisacodyL (DULCOLAX) tablet 10 mg  10 mg Oral Q48H PRN       Allergies: Allergies   Allergen Reactions    Penicillin G Hives       Lab/Data Review:  Recent Results (from the past 24 hour(s))   GLUCOSE, POC    Collection Time: 08/30/22  5:18 PM   Result Value Ref Range    Glucose (POC) 118 (H) 70 - 110 mg/dL   URINALYSIS W/MICROSCOPIC    Collection Time: 08/30/22  5:55 PM   Result Value Ref Range    Color YELLOW      Appearance CLEAR      Specific gravity 1.005 1.005 - 1.030      pH (UA) 7.0 5.0 - 8.0      Protein TRACE (A) NEG mg/dL    Glucose Negative NEG mg/dL    Ketone Negative NEG mg/dL    Bilirubin Negative NEG      Blood LARGE (A) NEG      Urobilinogen 0.2 0.2 - 1.0 EU/dL    Nitrites Positive (A) NEG      Leukocyte Esterase LARGE (A) NEG      WBC 21 to 35 0 - 4 /hpf    RBC 1 to 4 0 - 5 /hpf    Epithelial cells 1+ 0 - 5 /lpf    Bacteria 3+ (A) NEG /hpf   GLUCOSE, POC    Collection Time: 08/30/22  8:59 PM   Result Value Ref Range    Glucose (POC) 157 (H) 70 - 110 mg/dL   GLUCOSE, POC    Collection Time: 08/31/22  9:07 AM   Result Value Ref Range    Glucose (POC) 149 (H) 70 - 110 mg/dL   GLUCOSE, POC    Collection Time: 08/31/22 12:15 PM   Result Value Ref Range    Glucose (POC) 112 (H) 70 - 110 mg/dL       Assessment:     Primary Rehabilitation Diagnosis  1. Impaired Mobility and ADLs  2. Acute Transverse Myelitis    Comorbidities  Patient Active Problem List   Diagnosis Code    History of uterine cancer Z85.42    Hydronephrosis N13.30    Idiopathic transverse myelitis ([facility] [address] G37.3       Plan:     1. Justification for continued stay: Good progression towards established rehabilitation goals. 2. Medical Issues being followed closely:    [x]  Fall and safety precautions     []  Wound Care     [x]  Bowel and Bladder Function     [x]  Fluid Electrolyte and Nutrition Balance     []  Pain Control      3. Issues that 24 hour rehabilitation nursing is following:    [x]  Fall and safety precautions     []  Wound Care     [x]  Bowel and Bladder Function     [x]  Fluid Electrolyte and Nutrition Balance     []  Pain Control      [x]  Assistance with and education on in-room safety with transfers to and from the bed, wheelchair, toilet and shower. 4. Acute rehabilitation plan of care:    [x]  Continue current care and rehab. [x]  Physical Therapy           [x]  Occupational Therapy           []  Speech Therapy     []  Hold Rehab until further notice     5. Medications:    [x]  MAR Reviewed     [x]  Continue Present Medications     6. Chemical DVT Prophylaxis:      []  Enoxaparin     []  Unfractionated Heparin     []  Warfarin     []  NOAC     []  Aspirin     [x]  None     7. Mechanical DVT Prophylaxis:      []  JACE Stockings     []  Sequential Compression Device     [x]  None     8. GI Prophylaxis:      []  PPI     []  H2 Blocker     [x]  None / Not indicated     9. Code status:    [x]  Full code     []  Partial code     []  Do not intubate     []  Do not resuscitate     10. Diet:  Specifications Adult Diet: Regular 4 carb choices (60 gm/meal)    Solids (consistency) Regular    Liquids (consistency) Regular    Fluid Restriction None      11. Orders:     Acute Transverse Myelitis  Idiopathic transverse myelitis-patient treated with high-dose IV steroids, MRI brain showed demyelination, suggestive of transverse myelitis, patient underwent an MRI of the thoracic as well as the lumbar spine, results attached, she also underwent a lumbar puncture which was noted to be negative.     Urinary Retention secondary to acute transverse myelitis   > Bladder Scans/Checks Q hours, If PVR > 250 Straight cath   > Flomax 0.4 mg PO   > 8/30: Urology has been consulted.  > 8/31: Urology input noted about increasing Flomax and providing patient a voiding trial.  If patient fails voiding trial, patient will be followed by urologist as an outpatient.   > Continue to monitor    Diabetes Mellitus Type 2    > Glucophage 500 mg daily with breakfast     Hypertension   > Coreg 6.25 mg - held due to hypotensive episode   > Losartan 50 mg    > HCTZ 12.5 mg     Knee pain/osteoarthritis  > Continue Voltaren cream as needed    Total clinical care time is 25 minutes, including review of chart including all labs, radiology, past medical history, and discussion with patient and family. Greater than 50% of my time was spent in coordination of care and counseling. Disclaimer: Sections of this note are dictated using utilizing voice recognition software, which may have resulted in some phonetic based errors in grammar and contents. Even though attempts were made to correct all the mistakes, some may have been missed, and remained in the body of the document. If questions arise, please contact our department.     Signed:    Gabino Orellana MD    August 31, 2022

## 2022-08-31 NOTE — PROGRESS NOTES
1239-Voiding trial today. Rdz cath discontinued & removed. Patient expected to void by 1630 or earlier,with max of 18:30 , will monitor by doing bladder scan. With active order of : to reinsert rdz if bladder scan shows > 300 ml.       1430-Patient voided. Unable to do bladder scan. 1510-Informed Román nurse practitioner regarding yesterday's UA result, RN lead Angie,asked if the urine specimen was sent after a rdz cath was changed & finding out that the old rdz was still in when a specimen was collected. A new UA order was placed to verify if yesterdays result is real or has false positive. Patient made aware for urine collection. 1630-Patient was able to void for the 2nd time. PVR was done w/ 17 ml of urine in bladder. Urine specimen was sent.

## 2022-08-31 NOTE — PROGRESS NOTES
Problem: Mobility Impaired (Adult and Pediatric)  Goal: *Acute Goals and Plan of Care (Insert Text)  Description: Physical Therapy Short Term Goals  Initiated 8/24/2022 and to be accomplished within 7 day(s) (8/31/2022) (working toward long term goals)  1. Patient will move from supine to sit and sit to supine  in bed with modified independence. Goal MET 8/31/2022  2. Patient will transfer from bed to chair and chair to bed with supervision/set-up using the least restrictive device. Goal ongoing for standing transfers with RW 8/31/2022  3. Patient will perform sit to stand with maximal assistance. Goal MET 8/31/2022  4. Patient will propel w/c over level surfaces greater than or equal to 150 feet with modified independence. Goal MET 8/31/2022    Physical Therapy Long Term Goals  Initiated 8/24/2022 and to be accomplished within 21 day(s) (09/14/2022)  1. Patient will move from supine to sit and sit to supine , scoot up and down, and roll side to side in bed with independence. 2.  Patient will transfer from bed to chair and chair to bed with modified independence using the least restrictive device. 3.  Patient will perform sit to stand with minimal assistance/contact guard assist. Goal MET 8/31/2022  Updated goal: Patient will perform sit to stand with SBA. 4.  Patient will ambulate with moderate assistance  for 25 feet with the least restrictive device. 5.  Patient will ascend/descend 4 stairs with 1 handrail(s) with moderate assistance. Goal MET 8/31/2022  Updated goal: Patient will ascend/descend 4 stairs with 1 handrail with CGA. 6.  Patient will propel w/c over level surfaces, surfaces of varying grades, thresholds and ramps with modified independence.       8/31/2022 0808 by Vicky Reilly PT  Outcome: Progressing Towards Goal   PHYSICAL THERAPY WEEKLY PROGRESS NOTE    Patient: Jami Do (89 y.o. female)  Date: 8/31/2022  Diagnosis: Idiopathic transverse myelitis ([facility] [address] [G37.3]  Precautions: Fall  Chart, physical therapy assessment, plan of care and goals were reviewed. Time in:730  Time out:09    Patient seen for: Balance activities;Gait training;Patient education;Transfer training; Therapeutic exercise; Wheelchair mobility      Pain:  Patient did not indicate pain during session. Patient identified with name and : yes    SUBJECTIVE:     Patient agreeable to treatment. Reporting that she is looking forward to discharge over the weekend due to not being able to sleep well consistently.     OBJECTIVE DATA SUMMARY:       GROSS ASSESSMENT Weekly Progress Assessment 2022   AROM Generally decreased, functional   Strength Generally decreased, functional   Coordination Generally decreased, functional (left LE with more impaired coordination compared to right)   Tone Normal   Sensation Impaired   PROM Within functional limits       POSTURE Weekly Progress Assessment 2022   Posture (WDL) Exceptions to [facility]   Posture Assessment Rounded shoulders       BALANCE Weekly Progress Assessment 2022    Sitting - Static: Good (unsupported)  Sitting - Dynamic: Good (unsupported)  Standing - Static: Fair  Standing - Dynamic : Impaired     BED/CHAIR/WHEELCHAIR TRANSFERS Initial Assessment Weekly Progress Assessment 2022   Rolling Right 6 (Modified independent) 6 (Modified independent)   Rolling Left 6 (Modified independent) 6 (Modified independent)   Supine to Sit 5 (Supervision) 6 (Modified independent)   Sit to Stand Unable at this time Contact guard assistance (CG/SBA with cues for safe hand placement)   Sit to Supine 5 (Supervision) 6 (Modified independent)   Transfer Type Other Other (stand step with RW)   Transfer Assistance Needed 1 (Total assistance) (unable to achieve stand for stand step,, was able to perform lateral transfer with minimal physical assistance and maximal verbal cuing utilizing squat pivot technique with multiple attempts at clearing sacrum as pt does not maintain sacral clearance.) 4 (Contact guard assistance)   Comments    Patient performing sit to stand transfers 3 x 8 reps from 23\" mat table with emphasis on maintaining center of gravity over base of support. Needing CG/min A for anterior weightshift over base of support. Car Transfer  (CGA using RW, cues for safe hand placement initially.)  (CGA using RW, cues for safe hand placement initially.)   Car Type car transfer simulator car transfer simulator       WHEELCHAIR MOBILITY/MANAGEMENT Initial Assessment Weekly Progress Assessment 8/31/2022   Able to Propel (dist) 186 feet 190 feet   Assistance Required 4 Mod I over even surfaces   Curbs/ramps assistance required 0 (Not tested) 0 (Not tested)   Wheelchair set up assistance required 3 (Moderate assistance)  Mod I for brakes management   Wheelchair management Manages left brake, Manages right brake Manages left brake;Manages right brake   Comments  Using bilat UE and LE to propel chair       GAIT Weekly Progress Assessment 8/31/2022   Gait Description (WDL) Exceptions to WDL   Gait Abnormalities Decreased step clearance (slight LE ataxia with impaired foot placement)       WALKING INDEPENDENCE Initial Assessment Weekly Progress Assessment 8/31/2022   Assistive device Gait belt, Walker, rolling Walker, rolling;Gait belt   Ambulation assistance - level surface  (Unable at this time) 4 (Contact guard assistance)   Distance 0 Feet (ft) 22 Feet (ft) (22 ft, 34 ft, 15 ft bouts)   Comments   Performing 3 bouts of gait, maximum distance 34 ft before needing to sit. Emphasis on stepping into middle of RW and having appropriate step length, safe width COLIN during gait.    Ambulation assistance - unlevel surfaces  (not tested today)  (NT today due to safety concern, patient fatiguing quickly with gait and standing activity over even surfaces.)       STEPS/STAIRS Initial Assessment Weekly Progress Assessment 8/31/2022   Steps/Stairs ambulated 0 2 (2 stairs, then 3 stairs second bout (6 inch steps). Needing seated rest immediately after performing each bout of stairs due to fatigue)   Assistance Required   4 (Contact guard assistance)   Rail Use Both (2 rails progressing to 1 railing using sidestepping method (leading right LE up step)) Left  (one railing with sidestepping technique)   Comments    Patient fatiguing initially after 2 stairs before needing seated rest. After a rest, able to negotiate 3 stairs before needing to sit down on chair due to fatigue. Curbs/Ramps  (not tested today)  (practiced up/down 2\" curb step to simulate threshold step to enter home. needing CGA and cues for safe sequencing using RW, safe foot placement)       Therapeutic Exercise:  Seated therex including LAQ with 2# ankle weights 3 x 10 reps to improve knee extension strength for standing activities and increased safety. ASSESSMENT:  Patient has made significant progress toward her goals, meeting all but one STG and met 2 initial LTG. Standing and gait goals updated to reflect patient's progress and needs for discharge home. Patient continues to fatigue quickly needing frequent seated rests with standing activities and does not yet gait for household distances; relies on lightweight w/c for primary means of mobility. Progression toward goals:  [x]      Improving appropriately and progressing toward goals  []      Improving slowly and progressing toward goals  []      Not making progress toward goals and plan of care will be adjusted     PLAN:  Patient continues to benefit from skilled intervention to address the above impairments. Continue treatment per established plan of care.   Discharge Recommendations:  Home Health  Further Equipment Recommendations for Discharge:  RW, w/c     Estimated Discharge Date: 9/3/2022    Activity Tolerance:   Fair due to fatigue  After treatment:   [] Patient left in no apparent distress in bed  [x] Patient left in no apparent distress sitting up in chair  [] Patient left in no apparent distress in w/c mobilizing under own power  [] Patient left in no apparent distress dining area  [] Patient left in no apparent distress mobilizing under own power  [x] Call bell left within reach  [x] Nursing notified  [] Caregiver present  [] Bed alarm activated   [x] Chair alarm activated      Isma Pruitt, PT  8/31/2022

## 2022-09-01 ENCOUNTER — HOME HEALTH ADMISSION (OUTPATIENT)
Dept: HOME HEALTH SERVICES | Facility: HOME HEALTH | Age: 77
End: 2022-09-01
Payer: MEDICARE

## 2022-09-01 LAB
GLUCOSE BLD STRIP.AUTO-MCNC: 101 MG/DL (ref 70–110)
GLUCOSE BLD STRIP.AUTO-MCNC: 127 MG/DL (ref 70–110)
GLUCOSE BLD STRIP.AUTO-MCNC: 84 MG/DL (ref 70–110)
GLUCOSE BLD STRIP.AUTO-MCNC: 90 MG/DL (ref 70–110)

## 2022-09-01 PROCEDURE — 51798 US URINE CAPACITY MEASURE: CPT

## 2022-09-01 PROCEDURE — 74011250637 HC RX REV CODE- 250/637: Performed by: NURSE PRACTITIONER

## 2022-09-01 PROCEDURE — 97530 THERAPEUTIC ACTIVITIES: CPT

## 2022-09-01 PROCEDURE — 74011250637 HC RX REV CODE- 250/637: Performed by: EMERGENCY MEDICINE

## 2022-09-01 PROCEDURE — 65310000000 HC RM PRIVATE REHAB

## 2022-09-01 PROCEDURE — 97116 GAIT TRAINING THERAPY: CPT

## 2022-09-01 PROCEDURE — 2709999900 HC NON-CHARGEABLE SUPPLY

## 2022-09-01 PROCEDURE — 82962 GLUCOSE BLOOD TEST: CPT

## 2022-09-01 PROCEDURE — 97110 THERAPEUTIC EXERCISES: CPT

## 2022-09-01 PROCEDURE — 97535 SELF CARE MNGMENT TRAINING: CPT

## 2022-09-01 PROCEDURE — 99232 SBSQ HOSP IP/OBS MODERATE 35: CPT | Performed by: EMERGENCY MEDICINE

## 2022-09-01 RX ORDER — LEVOFLOXACIN 500 MG/1
500 TABLET, FILM COATED ORAL EVERY 24 HOURS
Status: DISCONTINUED | OUTPATIENT
Start: 2022-09-01 | End: 2022-09-03 | Stop reason: HOSPADM

## 2022-09-01 RX ORDER — TIMOLOL MALEATE 2.5 MG/ML
1 SOLUTION/ DROPS OPHTHALMIC 2 TIMES DAILY
Qty: 10 ML | Refills: 0 | Status: CANCELLED | OUTPATIENT
Start: 2022-09-02

## 2022-09-01 RX ORDER — LEVOFLOXACIN 500 MG/1
500 TABLET, FILM COATED ORAL EVERY 24 HOURS
Qty: 6 TABLET | Refills: 0 | Status: CANCELLED | OUTPATIENT
Start: 2022-09-02 | End: 2022-09-08

## 2022-09-01 RX ADMIN — LEVOFLOXACIN 500 MG: 500 TABLET, FILM COATED ORAL at 11:31

## 2022-09-01 RX ADMIN — TIMOLOL MALEATE 1 DROP: 2.5 SOLUTION OPHTHALMIC at 17:00

## 2022-09-01 RX ADMIN — METFORMIN HYDROCHLORIDE 500 MG: 500 TABLET ORAL at 09:19

## 2022-09-01 RX ADMIN — FLUTICASONE PROPIONATE 2 SPRAY: 50 SPRAY, METERED NASAL at 22:18

## 2022-09-01 RX ADMIN — TIMOLOL MALEATE 1 DROP: 2.5 SOLUTION OPHTHALMIC at 09:19

## 2022-09-01 RX ADMIN — Medication 1 CAPSULE: at 11:32

## 2022-09-01 RX ADMIN — LOSARTAN POTASSIUM 50 MG: 50 TABLET, FILM COATED ORAL at 09:19

## 2022-09-01 RX ADMIN — HYDROCHLOROTHIAZIDE 12.5 MG: 25 TABLET ORAL at 09:18

## 2022-09-01 RX ADMIN — TAMSULOSIN HYDROCHLORIDE 0.8 MG: 0.4 CAPSULE ORAL at 09:18

## 2022-09-01 RX ADMIN — ATORVASTATIN CALCIUM 40 MG: 40 TABLET, FILM COATED ORAL at 22:17

## 2022-09-01 RX ADMIN — Medication 1 TABLET: at 17:00

## 2022-09-01 RX ADMIN — Medication 1 TABLET: at 09:18

## 2022-09-01 NOTE — PROGRESS NOTES
Writer called microbiology to report that the UA resulted on 8/30 1755 is erroneous since it was collected from an old rdz. The UA from 8/31/22 was collected appropriately.      Cheli Braga MSN, RN, [address] [place] [address]  [phone]

## 2022-09-01 NOTE — PROGRESS NOTES
1000 AM patient urine output 200, 100 ml PVR  1157 AM patient urine output 200, 54 ml PVR  1254 PM patient urine output 300, 77 ml PVR  1315 PM patient urine output unmeasured due to stool.  262 ml PVR  1345 PM patient urine output 100, 182 ml PVR  1612 PM patient urine output 150 , 294 ml PVR

## 2022-09-01 NOTE — PROGRESS NOTES
Nutrition Note      Pt with good po intake most meals; few meals with fair intake. Tolerating diet. Meal intake adequate to meet patient's estimated nutrition needs. BM 8/31. Nutrition goals are being met. Nutrition Recommendations/Plan:   No nutrition intervention indicated at this time. Will re-screen as appropriate.             Electronically signed by Josephine Acharya RD on 9/1/2022 at 5:30 PM    Contact: [phone]

## 2022-09-01 NOTE — PROGRESS NOTES
[facility] PHYSICAL REHABILITATION  [address] [place] [address], [address]     INPATIENT REHABILITATION  DAILY PROGRESS NOTE     Date: 9/1/2022    Name: Ariane Napier Age / Sex: 78 y.o. / female   CSN: [id number] MRN: [id number]   Admit Date: 8/23/2022 Length of Stay: 9 days     Primary Rehabilitation Diagnosis: Impaired Mobility and ADLs secondary to Acute Transverse Myelitis       Subjective:   I personally saw and evaluated this patient on September 1, 2022  Patient is sitting in bed in no apparent distress, awake, follows commands.   In good spirits      Objective:     Vital Signs:  Patient Vitals for the past 24 hrs:   BP Temp Pulse Resp SpO2   09/01/22 0758 (!) 140/87 97.2 °F (36.2 °C) 89 18 96 %   08/31/22 2019 125/79 98 °F (36.7 °C) 87 19 96 %   08/31/22 1548 99/62 96.9 °F (36.1 °C) 76 16 94 %        Physical Examination:  General:  Awake, alert  Cardiovascular:  S1S2+, RRR  Pulmonary:  CTA b/l  GI:  Soft, BS+, NT, ND  Extremities:  No edema  Motor power is 4 out of 5 in both lower extremities    Functional Progress:    OCCUPATIONAL THERAPY    ON ADMISSION MOST RECENT   Eating  Functional Level: 6 (set-up)   Eating  Functional Level: 6 (set-up)     Grooming  Functional Level: 5 (setup EOB)   Grooming  Functional Level: 5 (set-up/ Mod I (w/c at sink))     Bathing  Functional Level: 4 (4 without LHS and 5 with LHS)   Bathing  Functional Level: 5     Upper Body Dressing  Functional Level: 5   Upper Body Dressing  Functional Level: 5 (setup)     Lower Body Dressing  Functional Level: 5 (setup EOB)   Lower Body Dressing  Functional Level: 4 (asst for clothing management and vcs to manage catheter)     Toileting  Functional Level: 5 (setup)   Toileting  Functional Level: 5 (SBA)     Toilet Transfers  Toilet Transfer Score: 5 (based on sqaut pivot from EOB to w/c)   Toilet Transfers  Toilet Transfer Score: 5 (Squat pivot transfer SBA (w/c <-> elevated seat over toilet); grab bar)     Tub Armaan Pyo Transfers  Tub/Shower Transfer Score: 5 (vcs for ant weight shift and clearing sacrum)   Tub/Shower Transfers  Tub/Shower Transfer Score: 5 (pt used squat pivot method an declned using RW)       Legend:   7 - Independent   6 - Modified Independent   5 - Standby Assistance / Supervision / Set-up   4 - Minimum Assistance / Contact Guard Assistance   3 - Moderate Assistance   2 - Maximum Assistance   1 - Total Assistance / Dependent         Current Medications:  Current Facility-Administered Medications   Medication Dose Route Frequency    levoFLOXacin (LEVAQUIN) tablet 500 mg  500 mg Oral Q24H    L. acidophilus,casei,rhamnosus (BIO-K PLUS) capsule 1 Capsule  1 Capsule Oral DAILY    tamsulosin (FLOMAX) capsule 0.8 mg  0.8 mg Oral DAILY    diclofenac (VOLTAREN) 1 % topical gel 2 g  2 g Topical QID PRN    pneumococcal 23-valent (PNEUMOVAX 23) injection 0.5 mL  0.5 mL IntraMUSCular PRIOR TO DISCHARGE    atorvastatin (LIPITOR) tablet 40 mg  40 mg Oral QHS    calcium-vitamin D 600 mg-5 mcg (200 unit) per tablet 1 Tablet  1 Tablet Oral BID    cetirizine (ZYRTEC) tablet 5 mg  5 mg Oral DAILY PRN    losartan (COZAAR) tablet 50 mg  50 mg Oral DAILY    metFORMIN (GLUCOPHAGE) tablet 500 mg  500 mg Oral DAILY WITH BREAKFAST    hydroCHLOROthiazide (HYDRODIURIL) tablet 12.5 mg  12.5 mg Oral DAILY    [Held by provider] carvediloL (COREG) tablet 6.25 mg  6.25 mg Oral Q12H    timolol (TIMOPTIC) 0.25% ophthalmic solution  1 Drop Both Eyes BID    fluticasone propionate (FLONASE) 50 mcg/actuation nasal spray 2 Spray  2 Spray Both Nostrils DAILY    insulin lispro (HUMALOG) injection   SubCUTAneous AC&HS    glucose chewable tablet 16 g  4 Tablet Oral PRN    glucagon (GLUCAGEN) injection 1 mg  1 mg IntraMUSCular PRN    dextrose 10% infusion 0-250 mL  0-250 mL IntraVENous PRN    [Held by provider] docusate sodium (COLACE) capsule 100 mg  100 mg Oral BID    bisacodyL (DULCOLAX) tablet 10 mg  10 mg Oral Q48H PRN       Allergies:   Allergies Allergen Reactions    Penicillin G Hives       Lab/Data Review:  Recent Results (from the past 24 hour(s))   URINALYSIS W/MICROSCOPIC    Collection Time: 08/31/22  4:30 PM   Result Value Ref Range    Color YELLOW      Appearance CLEAR      Specific gravity 1.010 1.005 - 1.030      pH (UA) 7.5 5.0 - 8.0      Protein Negative NEG mg/dL    Glucose Negative NEG mg/dL    Ketone Negative NEG mg/dL    Bilirubin Negative NEG      Blood TRACE (A) NEG      Urobilinogen 0.2 0.2 - 1.0 EU/dL    Nitrites Positive (A) NEG      Leukocyte Esterase MODERATE (A) NEG      WBC 18 to 20 0 - 4 /hpf    RBC 0 to 3 0 - 5 /hpf    Epithelial cells 1+ 0 - 5 /lpf    Bacteria 4+ (A) NEG /hpf    Amorphous Crystals 2+ (A) NEG   GLUCOSE, POC    Collection Time: 08/31/22  4:59 PM   Result Value Ref Range    Glucose (POC) 107 70 - 110 mg/dL   GLUCOSE, POC    Collection Time: 08/31/22  9:05 PM   Result Value Ref Range    Glucose (POC) 125 (H) 70 - 110 mg/dL   GLUCOSE, POC    Collection Time: 09/01/22  8:00 AM   Result Value Ref Range    Glucose (POC) 127 (H) 70 - 110 mg/dL   GLUCOSE, POC    Collection Time: 09/01/22 11:34 AM   Result Value Ref Range    Glucose (POC) 101 70 - 110 mg/dL       Assessment:     Primary Rehabilitation Diagnosis  1. Impaired Mobility and ADLs  2. Acute Transverse Myelitis    Comorbidities  Patient Active Problem List   Diagnosis Code    History of uterine cancer Z85.42    Hydronephrosis N13.30    [facility] G37.3       Plan:     1. Justification for continued stay: Good progression towards established rehabilitation goals. 2. Medical Issues being followed closely:    [x]  Fall and safety precautions     []  Wound Care     [x]  Bowel and Bladder Function     [x]  Fluid Electrolyte and Nutrition Balance     []  Pain Control      3.  Issues that 24 hour rehabilitation nursing is following:    [x]  Fall and safety precautions     []  Wound Care     [x]  Bowel and Bladder Function     [x]  Fluid Electrolyte and Nutrition Balance     []  Pain Control      [x]  Assistance with and education on in-room safety with transfers to and from the bed, wheelchair, toilet and shower. 4. Acute rehabilitation plan of care:    [x]  Continue current care and rehab. [x]  Physical Therapy           [x]  Occupational Therapy           []  Speech Therapy     []  Hold Rehab until further notice     5. Medications:    [x]  MAR Reviewed     [x]  Continue Present Medications     6. Chemical DVT Prophylaxis:      []  Enoxaparin     []  Unfractionated Heparin     []  Warfarin     []  NOAC     []  Aspirin     [x]  None     7. Mechanical DVT Prophylaxis:      []  JACE Stockings     [x]  Sequential Compression Device     []  None     8. GI Prophylaxis:      []  PPI     []  H2 Blocker     [x]  None / Not indicated     9. Code status:    [x]  Full code     []  Partial code     []  Do not intubate     []  Do not resuscitate           Acute Transverse Myelitis  Idiopathic transverse myelitis-patient treated with high-dose IV steroids, MRI brain showed demyelination, suggestive of transverse myelitis, patient underwent an MRI of the thoracic as well as the lumbar spine,, she also underwent a lumbar puncture which was noted to be negative. Urinary Retention secondary to acute transverse myelitis   Continue Flomax    Diabetes Mellitus Type 2    > Glucophage 500 mg daily with breakfast     Hypertension   Continue losartan and hydrochlorothiazide    Knee pain/osteoarthritis  > Continue Voltaren cream as needed    Discussed with patient    Dragon medical dictation software was used for portions of this report. Unintended errors may occur.       Signed:    Stewart Infante MD    September 1, 2022

## 2022-09-01 NOTE — PROGRESS NOTES
Problem: Falls - Risk of  Goal: *Absence of Falls  Description: Document Juana Herman Fall Risk and appropriate interventions in the flowsheet.   Outcome: Progressing Towards Goal  Note: Fall Risk Interventions:  Mobility Interventions: Bed/chair exit alarm         Medication Interventions: Bed/chair exit alarm    Elimination Interventions: Call light in reach, Bed/chair exit alarm    History of Falls Interventions: Bed/chair exit alarm         Problem: Patient Education: Go to Patient Education Activity  Goal: Patient/Family Education  Outcome: Progressing Towards Goal     Problem: Pain  Goal: *Control of Pain  Outcome: Progressing Towards Goal

## 2022-09-01 NOTE — PROGRESS NOTES
8103 MD Dr. Thierry Daniel notified of positve UTI from specimen collected on 8/31/2022. Orders given for Levaquin 500 mg po every 24 hrs.  Labs ordered as well for tomorrow AM.

## 2022-09-01 NOTE — PROGRESS NOTES
Problem: Falls - Risk of  Goal: *Absence of Falls  Description: Document Lilliana Mann Fall Risk and appropriate interventions in the flowsheet. Outcome: Progressing Towards Goal  Note: Fall Risk Interventions:  Mobility Interventions: Assess mobility with egress test, Bed/chair exit alarm, Strengthening exercises (ROM-active/passive), Utilize walker, cane, or other assistive device, Utilize gait belt for transfers/ambulation, Patient to call before getting OOB    Mentation Interventions: Bed/chair exit alarm, Door open when patient unattended, More frequent rounding, Reorient patient, Toileting rounds, Room close to nurse's station, Gait belt with transfers/ambulation, Familiar objects from home    Medication Interventions: Evaluate medications/consider consulting pharmacy, Patient to call before getting OOB, Teach patient to arise slowly, Utilize gait belt for transfers/ambulation, Bed/chair exit alarm    Elimination Interventions: Bed/chair exit alarm    History of Falls Interventions: Bed/chair exit alarm, Investigate reason for fall, Room close to nurse's station, Utilize gait belt for transfer/ambulation         Problem: Pain  Goal: *Control of Pain  Outcome: Progressing Towards Goal     Problem: Diabetes Self-Management  Goal: *Disease process and treatment process  Description: Define diabetes and identify own type of diabetes; list 3 options for treating diabetes.   Outcome: Progressing Towards Goal

## 2022-09-01 NOTE — DIABETES MGMT
Diabetes/ Glycemic Control Plan of Care  Recommendations:   Blood glucose within target range. Will continue inpatient monitoring. Fasting/ Morning blood glucose:   Lab Results   Component Value Date/Time    Glucose 122 (H) 08/30/2022 05:29 AM    Glucose (POC) 127 (H) 09/01/2022 08:00 AM     IV Fluids containing dextrose: none   Steroids:  none     Blood glucose values:        Latest Reference Range & Units 8/31/22 09:07 8/31/22 12:15 8/31/22 16:59 8/31/22 21:05 9/1/22 08:00   GLUCOSE,FAST - POC 70 - 110 mg/dL 149 (H) 112 (H) 107 125 (H) 127 (H)   (H): Data is abnormally high  Within target range (70-180mg/dL):  yes   Current insulin orders:   Lispro corrective insulin coverage AC&HS as needed. Metformin 500 mg daily with breakfast.  Total Daily Dose previous 24 hours = no insulin required. Current A1c:   Lab Results   Component Value Date/Time    Hemoglobin A1c 6.7 (H) 08/24/2022 06:08 AM      Equivalent  to an average Blood Glucose of 146 mg/dl for 2-3 months prior to admission  Adequate glycemic control PTA: YES (new diagnosis)  Nutrition/Diet:   Active Orders   Diet    ADULT DIET Regular; 4 carb choices (60 gm/meal);  Low Fat/Low Chol/High Fiber/2 gm Na      Meal Intake:  Patient Vitals for the past 168 hrs:   % Diet Eaten   09/01/22 0942 51 - 75%   08/31/22 1740 26 - 50%   08/31/22 1252 51 - 75%   08/31/22 0911 76 - 100%   08/30/22 1801 76 - 100%   08/30/22 1200 76 - 100%   08/30/22 0912 76 - 100%   08/29/22 1815 76 - 100%   08/29/22 1314 51 - 75%   08/29/22 1031 76 - 100%   08/28/22 1800 76 - 100%   08/28/22 1317 76 - 100%   08/27/22 1800 76 - 100%   08/27/22 1300 51 - 75%   08/27/22 0919 51 - 75%   08/26/22 1819 51 - 75%   08/26/22 1304 76 - 100%   08/26/22 0925 51 - 75%   08/25/22 1300 76 - 100%   08/25/22 1035 76 - 100%       Supplement Intake:  Patient Vitals for the past 168 hrs:   Supplement intake %   08/31/22 1740 0%   08/31/22 0911 0%   08/30/22 1801 0%         Home diabetes medications:   Key Antihyperglycemic Medications       Patient is on no antihyperglycemic meds. Plan/Goals:   Blood glucose will be within target of 70 - 180 mg/dl within 72 hours  Reinforce dietary and medication compliance at home.        Education:  [x] Refer to Diabetes Education Record (8/26/2022)                       [] Education not indicated at this time     Kassie Jerry [facility] CDE  Ext [phone]

## 2022-09-01 NOTE — PROGRESS NOTES
Problem: Mobility Impaired (Adult and Pediatric)  Goal: *Acute Goals and Plan of Care (Insert Text)  Description: Physical Therapy Short Term Goals  Initiated 8/24/2022 and to be accomplished within 7 day(s) (8/31/2022) (working toward long term goals)  1. Patient will move from supine to sit and sit to supine  in bed with modified independence. Goal MET 8/31/2022  2. Patient will transfer from bed to chair and chair to bed with supervision/set-up using the least restrictive device. Goal ongoing for standing transfers with RW 8/31/2022  3. Patient will perform sit to stand with maximal assistance. Goal MET 8/31/2022  4. Patient will propel w/c over level surfaces greater than or equal to 150 feet with modified independence. Goal MET 8/31/2022    Physical Therapy Long Term Goals  Initiated 8/24/2022 and to be accomplished within 21 day(s) (09/14/2022)  1. Patient will move from supine to sit and sit to supine , scoot up and down, and roll side to side in bed with independence. 2.  Patient will transfer from bed to chair and chair to bed with modified independence using the least restrictive device. 3.  Patient will perform sit to stand with minimal assistance/contact guard assist. Goal MET 8/31/2022  Updated goal: Patient will perform sit to stand with SBA. 4.  Patient will ambulate with moderate assistance  for 25 feet with the least restrictive device. 5.  Patient will ascend/descend 4 stairs with 1 handrail(s) with moderate assistance. Goal MET 8/31/2022  Updated goal: Patient will ascend/descend 4 stairs with 1 handrail with CGA. 6.  Patient will propel w/c over level surfaces, surfaces of varying grades, thresholds and ramps with modified independence.       Outcome: Progressing Towards Goal   PHYSICAL THERAPY TREATMENT    Patient: Shobha Schaffer (16 y.o. female)  Date: 9/1/2022  Diagnosis: Idiopathic transverse myelitis ([facility] [G37.3]   Precautions: Fall  Chart, physical therapy assessment, plan of care and goals were reviewed. Time In:1039  Time Out:1140    Patient seen for: Wheelchair mobility;Transfer training;Gait training (stair training)    Time In:1530  Time Out:1600    Patient seen for: Transfer training; Therapeutic exercise    Pain:  Pt pain was reported as no c/o pre-treatment. Pt pain was reported as no c/o post-treatment. Intervention: NA     Patient identified with name and : yes     SUBJECTIVE:      Pt is reported to have a UTI and was started on antibiotics today. Pt reports \"I just don't feel as strong today. \"     OBJECTIVE DATA SUMMARY:    Objective:     TRANSFERS Daily Assessment     Sit to Stand Assistance: Minimal assistance  Pt performed sit to stand from w/c with B hands on distal femurs requiring min assist for anterior and up motion. Car Transfers: Minimum assistance  Car Type: car txfr simulator  Pt performed car txfr with RW, performing ingress and egress of BLE with Supervision and v/c for sequencing. Pt required min assist for sit to stand from car seat and v/c for safe hand placement. GAIT Daily Assessment    Gait Description (WDL)      Gait Abnormalities Decreased step clearance    Assistive device Walker, rolling;Gait belt    Ambulation assistance - level surface 4 (Contact guard assistance)    Distance 90 Feet (ft) (56ft and 44ft)    Ambulation assistance- uneven surface      Comments Pt ambulated 44ft, 56ft and 90ft with RW and CGA with w/c follow. Pt demo'd reciprocal gait pattern with heel strike, slow pacing. STEPS/STAIRS Daily Assessment     Steps/Stairs ambulated 2 (6\" steps x2 trials)    Assistance Required 4 (Contact guard assistance)    Rail Use Left  (side stepping)    Comments  Pt negotiated up and down 2 6\" steps x2 trials with rest break between trials. Pt performed side stepping with left HR to ascend due to HR far apart at home, with CGA and v/c for step width with descent to allow space for other foot.      Curbs/Ramps  Pt negotiated up and down 2 6\" platform step with RW and CGA. BALANCE Daily Assessment     Sitting - Static: Good (unsupported)  Sitting - Dynamic: Good (unsupported)  Standing - Static: Fair  Standing - Dynamic : Impaired        WHEELCHAIR MOBILITY Daily Assessment     Able to Propel (ft): 186 feet  Functional Level: 6  Curbs/Ramps Assist Required (FIM Score): 0 (Not tested)  Wheelchair Setup Assist Required : 6 (Modified independent)  Wheelchair Management: Manages left brake;Manages right brake  Pt propelled w/c to and from gym with BLE Dian. THERAPEUTIC EXERCISES Daily Assessment       Seated BLE LAQ and hip flexion 2x15, standing BLE tap ups on 4\" step with RW and CGA 2x10          ASSESSMENT:  Pt demo'd increased gait tolerance today with significant increase in gait distance. Pt demo'd increased difficulty with sit to stand without using BUE due to c/o feeling weaker. Progression toward goals:  []      Improving appropriately and progressing toward goals  [x]      Improving slowly and progressing toward goals  []      Not making progress toward goals and plan of care will be adjusted      PLAN:  Patient continues to benefit from skilled intervention to address the above impairments. Continue treatment per established plan of care. Discharge Recommendations:  Home Health  Further Equipment Recommendations for Discharge:  20\" w/c and RW      Estimated Discharge Date:9/3/2022    Activity Tolerance:   Fair+  Please refer to the flowsheet for vital signs taken during this treatment.     After treatment:   [] Patient left in no apparent distress in bed  [x] Patient left in no apparent distress sitting up in chair  [] Patient left in no apparent distress in w/c mobilizing under own power  [] Patient left in no apparent distress dining area  [] Patient left in no apparent distress mobilizing under own power  [x] Call bell left within reach  [] Nursing notified  [] Caregiver present  [] Bed alarm activated   [x] Chair alarm activated      Ivory Horner, PTA  9/1/2022

## 2022-09-01 NOTE — ROUTINE PROCESS
@2109 -200 ml urine output  @2219  -250ml urine output ; 57ml PVR  @0302 -350 ml urine output  @0544 -200 ml urine output ;103 ml PVR

## 2022-09-01 NOTE — PROGRESS NOTES
SHIFT CHANGE NOTE FOR [facility]    Bedside and Verbal shift change report given to [facility] (oncoming nurse) by Dewey Oquendo LPN (offgoing nurse). Report included the following information SBAR, Kardex, MAR and Recent Results. Situation:   Code Status: Full Code   Hospital Day: 9   Problem List:   Hospital Problems  Date Reviewed: 11/18/2019            Codes Class Noted POA    Idiopathic [facility] ([facility] ICD-10-CM: G37.3  ICD-9-CM: 341.22  8/23/2022 Unknown           Background:   Past Medical History:   Past Medical History:   Diagnosis Date    Allergic rhinitis     Cancer ([facility] 1995    Uterine Cancer (radiation, surgery)    Diverticulosis     GERD (gastroesophageal reflux disease)     Hypercholesteremia     Hypertension     Steatosis of liver 2015        Assessment:  Changes in Assessment throughout shift: No change to previous assessment    Patient has a central line:  Reasons if yes: Insertion date: Last dressing date:  Patient has Valle Cath: no Reasons if yes:     Insertion date:   Last Vitals:     Vitals:    08/31/22 0909 08/31/22 1548 08/31/22 2019 09/01/22 0758   BP: (!) 158/69 99/62 125/79 (!) 140/87   Pulse: 80 76 87 89   Resp: 20 16 19 18   Temp: 97.8 °F (36.6 °C) 96.9 °F (36.1 °C) 98 °F (36.7 °C) 97.2 °F (36.2 °C)   SpO2: 94% 94% 96% 96%   Weight:       Height:           PAIN    Pain Assessment    Pain Intensity 1: 0 (09/01/22 1200) Pain Intensity 1: 2 (12/29/14 1105)      Pain Location 1: Abdomen      Pain Intervention(s) 1: Medication (see MAR)  Patient Stated Pain Goal: 0 Patient Stated Pain Goal: 0  Intervention effective: yes  Other actions taken for pain:      Skin Assessment  Skin color Skin Color: Appropriate for ethnicity  Condition/Temperature Skin Condition/Temp: Dry, Warm  Integrity Skin Integrity: Excoriation  Turgor    Weekly Pressure Ulcer Documentation  Pressure  Injury Documentation: No Pressure Injury Noted-Pressure Ulcer Prevention Initiated  Wound Prevention & Protection Methods  Orientation of wound Orientation of Wound Prevention: Posterior  Location of Prevention Location of Wound Prevention: Buttocks  Dressing Present Dressing Present : No  Dressing Status    Wound Offloading Wound Offloading (Prevention Methods): Bed, pressure redistribution/air, Chair cushion, Repositioning, Wheelchair    Wound Prevention Checklist  Precautions: Fall    []  Heel prevention boots placed on patient    []  Patient turned q2h during shift    []  Hermilo Order Set ordered    []  Patient on Piru bed/Specialty bed    []  Each Wound is documented during shift (Stage, Color, drainage, odor, measurements, and dressings)    [x]  Dual skin checks done at bedside during shift report with ISABELLA     INTAKE/OUPUT  Date 08/31/22 0700 - 09/01/22 0659 09/01/22 0700 - 09/02/22 0659   Shift 0313-3387 5558-1240 24 Hour Total 9327-1506 0476-3765 24 Hour Total   INTAKE   P.O. 840 150 990 480  480     P. O. 840 150 990 480  480   Shift Total(mL/kg) 840(9.5) 150(1.7) 990(11.2) 480(5.4)  480(5.4)   OUTPUT   Urine(mL/kg/hr) 987(0.9) 950(0.9) 1937(0.9) 700  700     Urine 17  17        Urine Voided  700  700     Urine Occurrence(s) 2 x 3 x 5 x 5 x  5 x   Emesis/NG output           Emesis Occurrence(s)  0 x 0 x      Stool           Stool Occurrence(s) 2 x 1 x 3 x 1 x  1 x   Shift Total(mL/kg) 987(11.2) 950(10.7) 1937(21.9) 700(7.9)  700(7.9)   NET -147 -800 -947 -220  -220   Weight (kg) 88.5 88.5 88.5 88.5 88.5 88.5       Recommendations:  Patient needs and requests: TOILETING    Pending tests/procedures: LABS PENDING     Functional Level/Equipment: Partial (one person) /      Fall Precautions:   Fall risk precautions were reinforced with the patient; she was instructed to call for help prior to getting up. The following fall risk precautions were continued: bed/ chair alarms, door signage, yellow bracelet and socks as well as update of the [employer] tool in the patient's room.    Jose J Score: 4    HEALS Safety Check    A safety check occurred in the patient's room between off going nurse and oncoming nurse listed above. The safety check included the below items  Area Items   H  High Alert Medications Verify all high alert medication drips (heparin, PCA, etc.)   E  Equipment Suction is set up for ALL patients (with ryan)  Red plugs utilized for all equipment (IV pumps, etc.)  WOWs wiped down at end of shift. Room stocked with oxygen, suction, and other unit-specific supplies   A  Alarms Bed alarm is set for fall risk patients  Ensure chair alarm is in place and activated if patient is up in a chair   L  Lines Check IV for any infiltration  Valle bag is empty if patient has a Valle   Tubing and IV bags are labeled   S  Safety   Room is clean, patient is clean, and equipment is clean. Hallways are clear from equipment besides carts. Fall bracelet on for fall risk patients  Ensure room is clear and free of clutter  Suction is set up for ALL patients (with ryan)  Hallways are clear from equipment besides carts.    Isolation precautions followed, supplies available outside room, sign posted     Dandre Jaquez LPN

## 2022-09-01 NOTE — PROGRESS NOTES
Problem: Self Care Deficits Care Plan (Adult)  Goal: *Therapy Goal (Edit Goal, Insert Text)  Description: Occupational Therapy Goals   Long Term Goals  Initiated 2022 and to be accomplished within 2-3 week(s) 2022  1. Pt will perform self-feeding with Deb. 2. Pt will perform grooming with Deb. 3. Pt will perform UB bathing with Deb  4. Pt will perform LB bathing with Deb. 5. Pt will perform tub/shower transfer with Deb. 6. Pt will perform UB dressing with Deb. 7. Pt will perform LB dressing with Deb. 8. Pt will perform toileting task with Deb. 9. Pt will perform toilet transfer with Deb. 10.  Pt will perform an IADL task with good safety and Deb. Short Term Goals   Initiated 2022 and to be accomplished within 7 day(s) 2022; updated 22  1. Pt will perform self-feeding with S GM 22  2. Pt will perform grooming with S. GM 22  3. Pt will perform UB bathing with S.  4. Pt will perform LB bathing with S  5. Pt will perform tub/shower transfer with S. GM 22  6. Pt will perform UB dressing with S.  7. Pt will perform LB dressing with S  8. Pt will perform toileting task with S.  9. Pt will perform toilet transfer with S.GM 22             Outcome: Progressing Towards Goal  Goal: Interventions  Outcome: Progressing Towards Goal   Occupational Therapy TREATMENT    Patient: Donnie Sims   78 y.o. Patient identified with name and : yes    Date: 2022    First Tx Session  Time In: 0930  Time Out[de-identified] 1000    Second Tx Session  Time In: 1400  Time Out[de-identified] 6178    Diagnosis: Idiopathic transverse myelitis ([facility] [G37.3]   Precautions: Fall  Chart, occupational therapy assessment, plan of care, and goals were reviewed. Pain:  Pt reports 0/10 pain or discomfort prior to treatment. Pt reports 0/10 pain or discomfort post treatment. Intervention Provided:       SUBJECTIVE:   Patient stated My two daughters love to read.     OBJECTIVE DATA SUMMARY: THERAPEUTIC ACTIVITY Daily Assessment    Pt engaged in BUE FM activity with green therapy putty which involved pinching, pulling, twisting, to retrieve small beads and pull from therapy putty against mod resistance. Pt demonstrated ability to retrieve 20 beads. Pt engaged in BUE functional reach activity with 1 1/2# using rainbow arch transporting 26 rings with L and R and instruction to not touch ring to arch. THERAPEUTIC EXERCISE Daily Assessment    Pt engaged in BUE strengthening with 2# dowel and bopping beach ball to therapist 10 reps, 3 sets with RB's in between sets. Pt engaged in BUE strengthening with green therabar in all planes (supination/pronation, IR/ER) 4x10 reps. Pt engaged in BUE hand strengthening with green therapy putty to increase BUE hand strength completing 4/4 exercises. TOILETING Daily Assessment     Pt completed toileting x2 (first session and second session) with SBA for all toileting tasks using FWW for stability and safety. MOBILITY/TRANSFERS Daily Assessment     Pt performed functional mobility self propelling with BUE and BLE with supervision/Dian. ASSESSMENT:  Pt is increasing independence and safety with toileting and increasing BUE strength and activity tolerance with self cares. Progression toward goals:  [x]          Improving appropriately and progressing toward goals  []          Improving slowly and progressing toward goals  []          Not making progress toward goals and plan of care will be adjusted     PLAN:  Patient continues to benefit from skilled intervention to address the above impairments. Continue treatment per established plan of care.   Discharge Recommendations:  Home Health occupational therapy with family assist  Further Equipment Recommendations for Discharge:  bedside commode, gait belt, transfer bench     Activity Tolerance:  Fair/good      Estimated LOS:9/3/22    Please refer to the flowsheet for vital signs taken during this treatment. After treatment:   [x]  Patient left in no apparent distress sitting up in chair   []  Patient left in no apparent distress in bed  [x]  Call bell left within reach  []  Nursing notified  []  Caregiver present  []  Bed alarm activated    COMMUNICATION/EDUCATION:   [] Home safety education was provided and the patient/caregiver indicated understanding. [] Patient/family have participated as able in goal setting and plan of care. [x] Patient/family agree to work toward stated goals and plan of care. [] Patient understands intent and goals of therapy, but is neutral about his/her participation. [] Patient is unable to participate in goal setting and plan of care.       Mera Case, OT

## 2022-09-02 LAB
ANION GAP SERPL CALC-SCNC: 7 MMOL/L (ref 3–18)
BASOPHILS # BLD: 0 K/UL (ref 0–0.1)
BASOPHILS NFR BLD: 0 % (ref 0–2)
BUN SERPL-MCNC: 12 MG/DL (ref 7–18)
BUN/CREAT SERPL: 24 (ref 12–20)
CALCIUM SERPL-MCNC: 9.3 MG/DL (ref 8.5–10.1)
CHLORIDE SERPL-SCNC: 105 MMOL/L (ref 100–111)
CO2 SERPL-SCNC: 27 MMOL/L (ref 21–32)
CREAT SERPL-MCNC: 0.5 MG/DL (ref 0.6–1.3)
DIFFERENTIAL METHOD BLD: ABNORMAL
EOSINOPHIL # BLD: 0.1 K/UL (ref 0–0.4)
EOSINOPHIL NFR BLD: 1 % (ref 0–5)
ERYTHROCYTE [DISTWIDTH] IN BLOOD BY AUTOMATED COUNT: 14.5 % (ref 11.6–14.5)
GLUCOSE BLD STRIP.AUTO-MCNC: 110 MG/DL (ref 70–110)
GLUCOSE BLD STRIP.AUTO-MCNC: 126 MG/DL (ref 70–110)
GLUCOSE BLD STRIP.AUTO-MCNC: 129 MG/DL (ref 70–110)
GLUCOSE BLD STRIP.AUTO-MCNC: 99 MG/DL (ref 70–110)
GLUCOSE SERPL-MCNC: 103 MG/DL (ref 74–99)
HCT VFR BLD AUTO: 36 % (ref 35–45)
HGB BLD-MCNC: 12 G/DL (ref 12–16)
IMM GRANULOCYTES # BLD AUTO: 0 K/UL (ref 0–0.04)
IMM GRANULOCYTES NFR BLD AUTO: 0 % (ref 0–0.5)
LYMPHOCYTES # BLD: 1.6 K/UL (ref 0.9–3.6)
LYMPHOCYTES NFR BLD: 22 % (ref 21–52)
MAGNESIUM SERPL-MCNC: 2.2 MG/DL (ref 1.6–2.6)
MCH RBC QN AUTO: 30.2 PG (ref 24–34)
MCHC RBC AUTO-ENTMCNC: 33.3 G/DL (ref 31–37)
MCV RBC AUTO: 90.7 FL (ref 78–100)
MONOCYTES # BLD: 0.8 K/UL (ref 0.05–1.2)
MONOCYTES NFR BLD: 11 % (ref 3–10)
NEUTS SEG # BLD: 4.6 K/UL (ref 1.8–8)
NEUTS SEG NFR BLD: 65 % (ref 40–73)
NRBC # BLD: 0 K/UL (ref 0–0.01)
NRBC BLD-RTO: 0 PER 100 WBC
PLATELET # BLD AUTO: 269 K/UL (ref 135–420)
PMV BLD AUTO: 9.2 FL (ref 9.2–11.8)
POTASSIUM SERPL-SCNC: 3.5 MMOL/L (ref 3.5–5.5)
RBC # BLD AUTO: 3.97 M/UL (ref 4.2–5.3)
SODIUM SERPL-SCNC: 139 MMOL/L (ref 136–145)
WBC # BLD AUTO: 7.1 K/UL (ref 4.6–13.2)

## 2022-09-02 PROCEDURE — 65310000000 HC RM PRIVATE REHAB

## 2022-09-02 PROCEDURE — 74011250637 HC RX REV CODE- 250/637: Performed by: EMERGENCY MEDICINE

## 2022-09-02 PROCEDURE — 77030038269 HC DRN EXT URIN PURWCK BARD -A

## 2022-09-02 PROCEDURE — 97150 GROUP THERAPEUTIC PROCEDURES: CPT

## 2022-09-02 PROCEDURE — 99239 HOSP IP/OBS DSCHRG MGMT >30: CPT | Performed by: EMERGENCY MEDICINE

## 2022-09-02 PROCEDURE — 83735 ASSAY OF MAGNESIUM: CPT

## 2022-09-02 PROCEDURE — 74011250637 HC RX REV CODE- 250/637: Performed by: NURSE PRACTITIONER

## 2022-09-02 PROCEDURE — 36415 COLL VENOUS BLD VENIPUNCTURE: CPT

## 2022-09-02 PROCEDURE — 85025 COMPLETE CBC W/AUTO DIFF WBC: CPT

## 2022-09-02 PROCEDURE — 82962 GLUCOSE BLOOD TEST: CPT

## 2022-09-02 PROCEDURE — 51798 US URINE CAPACITY MEASURE: CPT

## 2022-09-02 PROCEDURE — 97535 SELF CARE MNGMENT TRAINING: CPT

## 2022-09-02 PROCEDURE — 97530 THERAPEUTIC ACTIVITIES: CPT

## 2022-09-02 PROCEDURE — 97116 GAIT TRAINING THERAPY: CPT

## 2022-09-02 PROCEDURE — 80048 BASIC METABOLIC PNL TOTAL CA: CPT

## 2022-09-02 RX ORDER — LEVOFLOXACIN 500 MG/1
500 TABLET, FILM COATED ORAL EVERY 24 HOURS
Qty: 4 TABLET | Refills: 0 | Status: SHIPPED | OUTPATIENT
Start: 2022-09-03 | End: 2022-09-07

## 2022-09-02 RX ORDER — FLUTICASONE PROPIONATE 50 MCG
2 SPRAY, SUSPENSION (ML) NASAL DAILY
Qty: 1 EACH | Refills: 0 | Status: SHIPPED | OUTPATIENT
Start: 2022-09-02

## 2022-09-02 RX ORDER — TAMSULOSIN HYDROCHLORIDE 0.4 MG/1
0.8 CAPSULE ORAL DAILY
Qty: 60 CAPSULE | Refills: 0 | Status: SHIPPED | OUTPATIENT
Start: 2022-09-02 | End: 2022-10-02

## 2022-09-02 RX ORDER — LOSARTAN POTASSIUM 50 MG/1
50 TABLET ORAL DAILY
Qty: 30 TABLET | Refills: 0 | Status: SHIPPED | OUTPATIENT
Start: 2022-09-02 | End: 2022-10-02

## 2022-09-02 RX ORDER — METFORMIN HYDROCHLORIDE 500 MG/1
500 TABLET ORAL
Qty: 30 TABLET | Refills: 0 | Status: SHIPPED | OUTPATIENT
Start: 2022-09-02 | End: 2022-10-02

## 2022-09-02 RX ORDER — HYDROCHLOROTHIAZIDE 12.5 MG/1
12.5 TABLET ORAL DAILY
Qty: 30 TABLET | Refills: 0 | Status: SHIPPED | OUTPATIENT
Start: 2022-09-02 | End: 2022-10-02

## 2022-09-02 RX ADMIN — Medication 1 TABLET: at 18:05

## 2022-09-02 RX ADMIN — LOSARTAN POTASSIUM 50 MG: 50 TABLET, FILM COATED ORAL at 08:45

## 2022-09-02 RX ADMIN — Medication 1 TABLET: at 08:45

## 2022-09-02 RX ADMIN — METFORMIN HYDROCHLORIDE 500 MG: 500 TABLET ORAL at 08:45

## 2022-09-02 RX ADMIN — Medication 1 CAPSULE: at 08:45

## 2022-09-02 RX ADMIN — TIMOLOL MALEATE 1 DROP: 2.5 SOLUTION OPHTHALMIC at 08:44

## 2022-09-02 RX ADMIN — HYDROCHLOROTHIAZIDE 12.5 MG: 25 TABLET ORAL at 08:44

## 2022-09-02 RX ADMIN — LEVOFLOXACIN 500 MG: 500 TABLET, FILM COATED ORAL at 11:49

## 2022-09-02 RX ADMIN — TIMOLOL MALEATE 1 DROP: 2.5 SOLUTION OPHTHALMIC at 18:05

## 2022-09-02 RX ADMIN — TAMSULOSIN HYDROCHLORIDE 0.8 MG: 0.4 CAPSULE ORAL at 08:44

## 2022-09-02 RX ADMIN — ATORVASTATIN CALCIUM 40 MG: 40 TABLET, FILM COATED ORAL at 21:34

## 2022-09-02 NOTE — PROGRESS NOTES
SHIFT CHANGE NOTE FOR [facility]    Bedside and Verbal shift change report given to Rahul Harvey (oncoming nurse) by Bautista Pruitt RN (offgoing nurse). Report included the following information SBAR, Kardex, MAR and Recent Results. Situation:   Code Status: Full Code   Hospital Day: 10   Problem List:   Hospital Problems  Date Reviewed: 11/18/2019            Codes Class Noted POA    Idiopathic transverse myelitis ([facility] ICD-10-CM: G37.3  ICD-9-CM: 341.22  8/23/2022 Unknown         Background:   Past Medical History:   Past Medical History:   Diagnosis Date    Allergic rhinitis     Cancer ([facility] 1995    Uterine Cancer (radiation, surgery)    Diverticulosis     GERD (gastroesophageal reflux disease)     Hypercholesteremia     Hypertension     Steatosis of liver 2015        Assessment:  Changes in Assessment throughout shift: No change to previous assessment    Patient has a central line:  Reasons if yes: Insertion date: Last dressing date:  Patient has Valle Cath: no Reasons if yes:     Insertion date:   Last Vitals:     Vitals:    09/01/22 0758 09/01/22 1608 09/01/22 1636 09/01/22 2232   BP: (!) 140/87 117/76  120/77   Pulse: 89 93  85   Resp: 18 18  18   Temp: 97.2 °F (36.2 °C) (!) 96.6 °F (35.9 °C) 97.9 °F (36.6 °C) 98.2 °F (36.8 °C)   SpO2: 96% 96%  95%   Weight:       Height:           PAIN    Pain Assessment    Pain Intensity 1: 0 (09/02/22 0400) Pain Intensity 1: 2 (12/29/14 1105)      Pain Location 1: Abdomen      Pain Intervention(s) 1: Medication (see MAR)  Patient Stated Pain Goal: 0 Patient Stated Pain Goal: 0  Intervention effective: yes  Other actions taken for pain:      Skin Assessment  Skin color Skin Color: Appropriate for ethnicity  Condition/Temperature Skin Condition/Temp: Dry, Warm  Integrity Skin Integrity: Excoriation  Turgor    Weekly Pressure Ulcer Documentation  Pressure  Injury Documentation: No Pressure Injury Noted-Pressure Ulcer Prevention Initiated  Wound Prevention & Protection Methods  Orientation of wound Orientation of Wound Prevention: Posterior  Location of Prevention Location of Wound Prevention: Buttocks  Dressing Present Dressing Present : No  Dressing Status    Wound Offloading Wound Offloading (Prevention Methods): Bed, pressure redistribution/air    Wound Prevention Checklist  Precautions: Fall    []  Heel prevention boots placed on patient    []  Patient turned q2h during shift    []  Hermilo Order Set ordered    []  Patient on [place] bed/Specialty bed    []  Each Wound is documented during shift (Stage, Color, drainage, odor, measurements, and dressings)    [x]  Dual skin checks done at bedside during shift report with Kathryn    INTAKE/OUPUT  Date 09/01/22 0700 - 09/02/22 0659 09/02/22 0700 - 09/03/22 0659   Shift 0898-6588 4248-8539 24 Hour Total 6789-8414 2400-5094 24 Hour Total   INTAKE   P.O. 1220  1220        P. O. 1220  1220      Shift Total(mL/kg) 1220(13.8)  1220(13.8)      OUTPUT   Urine(mL/kg/hr) 1150(1.1) 1350(1.3) 2500(1.2)        Urine Voided 1150 1350 2500        Urine Occurrence(s) 7 x 7 x 14 x      Stool           Stool Occurrence(s) 1 x 0 x 1 x      Shift Total(mL/kg) 1150(13) 1350(15.3) 2500(28.3)      NET 70 -1350 -1280      Weight (kg) 88.5 88.5 88.5 88.5 88.5 88.5         Recommendations:  Patient needs and requests: TOILETING    Pending tests/procedures: LABS PENDING     Functional Level/Equipment:   / Wheelchair    Fall Precautions:   Fall risk precautions were reinforced with the patient; she was instructed to call for help prior to getting up. The following fall risk precautions were continued: bed/ chair alarms, door signage, yellow bracelet and socks as well as update of the Alex Ayoub tool in the patient's room. Jose J Score: 4    HEALS Safety Check    A safety check occurred in the patient's room between off going nurse and oncoming nurse listed above.     The safety check included the below items  Area Items   H  High Alert Medications Verify all high alert medication drips (heparin, PCA, etc.)   E  Equipment Suction is set up for ALL patients (with ryan)  Red plugs utilized for all equipment (IV pumps, etc.)  WOWs wiped down at end of shift. Room stocked with oxygen, suction, and other unit-specific supplies   A  Alarms Bed alarm is set for fall risk patients  Ensure chair alarm is in place and activated if patient is up in a chair   L  Lines Check IV for any infiltration  Valle bag is empty if patient has a Valle   Tubing and IV bags are labeled   S  Safety   Room is clean, patient is clean, and equipment is clean. Hallways are clear from equipment besides carts. Fall bracelet on for fall risk patients  Ensure room is clear and free of clutter  Suction is set up for ALL patients (with ryan)  Hallways are clear from equipment besides carts.    Isolation precautions followed, supplies available outside room, sign posted     Bautista Pruitt RN

## 2022-09-02 NOTE — DISCHARGE SUMMARY
[address]  [address] [place] [address], [address]     INPATIENT REHABILITATION  DISCHARGE SUMMARY    Name: Sarita Barron MRN: [id number]   Age / Sex: 78 y.o. / female CSN: [id number]   YOB: 1945 Length of Stay: 11 days   Admit Date: 8/23/2022 Discharge Date: 9/3/2022       PRIMARY CARE PHYSICIAN: Marleni Seymour MD      DISCHARGE DIAGNOSES:    Primary Rehabilitation Diagnosis  1. Impaired Mobility and ADLs  2. Acute Transverse Myelitis    Comorbidities  Patient Active Problem List   Diagnosis Code    History of uterine cancer Z85.42    Hydronephrosis N13.30    Idiopathic transverse myelitis ([facility] [address] G37.3       CONSULTS CALLED: None      PROCEDURES DONE: None      BRIEF HISTORY: (from the history and physical completed by Dr. Silvio Castillo)    70-year-old female with a past medical history of hypertension, lipidemia, uterine cancer presents as a transfer from [facility] for bilateral paresthesias and decreased rectal sensation. 7 days prior to presentation emergency room patient began having tingling in her toes bilaterally. 2 days later patient appreciated numbness across the bottom of the feet prompting her to be seen in the [facility] ER and was advised outpatient follow-up. Following that patient had a mechanical fall when her cane was not done around and after the fall patient started developing rectal numbness. Patient appreciated the numbness when she went to use the restroom and felt like she passed stool but did not. She presented today status post outpatient follow-up with provided concerns for cauda equina versus spinal cord compression necessitating a stat MRI. Initially patient underwent CT lumbar spine without contrast which showed mild/moderate multilevel degenerative changes of the visualized lumbar spine and sacroiliac joints without acute osseous abnormality and diffuse osteopenia.   MRI thoracic spine with and without contrast showed short segment intramedullary T2 hyperintense, slightly expansile lesion in the distal cord spanning from T10-T12 with patchy enhancement predominantly in the ventral aspect of the cord. No significant adjacent cord edema and no syrinx formation. MRI lumbar spine with and without contrast showed short segment intramedullary T2 hyperintense slightly expansile lesion of the distal cord spanning from T10-T12 with patchy enhancement of the ventral cord. Multilevel degenerative changes of the lumbar spine most significant at L4-L5 and mild spinal canal narrowing and effacement of the left greater than right subarticular recess and contact of the traversing L5 nerve root. Brain MRI suggest possible small vessel disease associated with demyelinating process with concern for possible idiopathic transverse myelitis. Neurosurgery and neurology were consulted and patient was diagnosed to have transverse myelitis with an expansile T2 hyperintense lesion with enhancement that spans between T10-T12. Hospital transfers myelitis remains unclear. Patient was treated with IV steroids during her hospital stay. PT OT were consulted. Patient has done well with PT OT with improvement of strength in her extremities. PT and OT recommended that patient be discharged to an acute rehab facility for further evaluation. The patient had remained hemodynamically stable but due to the above events, the patient was noted to have impaired mobility and ADLs. Patient was felt to be a good candidate for acute inpatient rehabilitation. Upon evaluation by Physical Therapy and Occupational Therapy, the patient was recommended for acute inpatient rehabilitation. The patient was discharged and was subsequently admitted to the [facility] for intensive rehabilitation to help recover strength, function and mobility.       COURSE IN THE HOSPITAL: Upon admission to the [facility], the patient underwent physical therapy, occupational therapy and speech therapy. The patient was able to actively participate in the rehabilitation activities and progressed well. On discharge, the patient was able to perform the following activities:    1.  Occupational Therapy    ON ADMISSION ON DISCHARGE   Eating  Functional Level: 6 (set-up)   Eating  Functional Level: 6 (Mod I)     Grooming  Functional Level: 5 (setup EOB)   Grooming  Functional Level: 6 (Mod I)     Bathing  Functional Level: 4 (4 without LHS and 5 with LHS)   Bathing  Functional Level: 5 (set-up/ supv (ADL shower; tub transfer bench))     Upper Body Dressing  Functional Level: 5   Upper Body Dressing  Functional Level: 6 (Mod I)     Lower Body Dressing  Functional Level: 5 (setup EOB)   Lower Body Dressing  Functional Level: 5 (set-up/ supv)     Toileting  Functional Level: 5 (setup)   Toileting  Functional Level: 5 (supv)     Toilet Transfers  Toilet Transfer Score: 5 (based on sqaut pivot from EOB to w/c)   Toilet Transfers  Toilet Transfer Score: 5 (Stand step transfer (SBA) RW; gait belt (elevated seat over toilet))     Tub /Shower Transfers  Tub/Shower Transfer Score: 5 (vcs for ant weight shift and clearing sacrum)   Tub/Shower Transfers  Tub/Shower Transfer Score: 4 (Stand step (tub transfer bench) transfer (SBA/ CGA) gait belt; RW)       2. Physical Therapy    ON ADMISSION ON DISCHARGE   Wheelchair Mobility/Management  Able to Propel (ft): 186 feet  Functional Level: 4  Curbs/Ramps Assist Required (FIM Score): 0 (Not tested)  Wheelchair Setup Assist Required : 3 (Moderate assistance)  Wheelchair Management: Manages left brake, Manages right brake Wheelchair Mobility/Management  Able to Propel (ft): 186 feet  Functional Level: 6  Curbs/Ramps Assist Required (FIM Score): 0 (Not tested)  Wheelchair Setup Assist Required : 6 (Modified independent)  Wheelchair Management: Manages left brake, Manages right brake     Gait  Amount of Assistance:  (Unable at this time)  Distance (ft): 0 Feet (ft)  Assistive Device: Gait belt, Walker, rolling Gait  Amount of Assistance: 4 (Contact guard assistance)  Distance (ft): 65 Feet (ft)  Assistive Device: Walker, rolling, Gait belt     Balance-Sitting/Standing  Sitting - Static: Good (unsupported)  Sitting - Dynamic: Fair (occasional)  Standing - Static:  (unable to achieve full stand at this time)  Standing - Dynamic : Impaired Balance-Sitting/Standing  Sitting - Static: Good (unsupported)  Sitting - Dynamic: Good (unsupported)  Standing - Static: Fair  Standing - Dynamic : Impaired     Bed/Mat Mobility  Rolling Right : 6 (Modified independent)  Rolling Left : 6 (Modified independent)  Supine to Sit : 5 (Supervision)  Sit to Supine : 5 (Supervision) Bed/Mat Mobility  Rolling Right : 6 (Modified independent)  Rolling Left : 6 (Modified independent)  Supine to Sit : 6 (Modified independent)  Sit to Supine : 6 (Modified independent)     Transfers  Transfer Type: Other  Other: squat pivot  Transfer Assistance : 1 (Total assistance) (unable to achieve stand for stand step,, was able to perform lateral transfer with minimal physical assistance and maximal verbal cuing utilizing squat pivot technique with multiple attempts at clearing sacrum as pt does not maintain sacral clearance.)  Sit to Stand Assistance: Unable at this time  Car Transfers:  (CGA using RW, cues for safe hand placement initially.)  Car Type: car transfer simulator Transfers  Transfer Type:  Other  Other: stand step txfr with RW  Transfer Assistance : 4 (Contact guard assistance)  Sit to Stand Assistance: Contact guard assistance  Car Transfers:  (CGA)  Car Type: car txfr simulator     Steps or Stairs  Steps/Stairs Ambulated (#): 0  Level of Assist :  (Unable at this time)  Rail Use: Both (2 rails progressing to 1 railing using sidestepping method (leading right LE up step)) Steps or Stairs  Steps/Stairs Ambulated (#): 4 (6\" steps and 2 6\" steps)  Level of Assist : 4 (Contact guard assistance)  Rail Use: Left  (side stepping)       3. Speech and Language Pathology    ON ADMISSION ON DISCHARGE   Comprehension (Native Language)  Primary Mode of Comprehension: Auditory  Score: 6 Comprehension (Native Language)  Primary Mode of Comprehension: Auditory  Score: 6     Expression (Native Language)  Primary Mode of Expression: Verbal  Score: 6   Expression (Native Language)  Primary Mode of Expression: Verbal  Score: 6     Social Interaction/Pragmatics  Score: 4 Social Interaction/Pragmatics  Score: 6     Problem Solving  Score: 4   Problem Solving  Score: 5     Memory  Score: 5 Memory  Score: 5       Legend:   7 - Independent   6 - Modified Independent   5 - Standby Assistance / Supervision / Set-up   4 - Minimum Assistance / Contact Guard Assistance   3 - Moderate Assistance   2 - Maximum Assistance   1 - Total Assistance / Dependent       ACUTE MEDICAL ISSUES ADDRESSED IN INPATIENT REHABILITATION FACILITY:     Acute Transverse Myelitis  Idiopathic transverse myelitis-patient treated with high-dose IV steroids, MRI brain showed demyelination, suggestive of transverse myelitis, patient underwent an MRI of the thoracic as well as the lumbar spine, results attached, she also underwent a lumbar puncture which was noted to be negative.      Urinary Retention secondary to acute transverse myelitis              > Bladder Scans/Checks Q hours, If PVR > 250 Straight cath              > Flomax 0.4 mg PO   > 8/30: Urology has been consulted.  > 8/31: Urology input noted about increasing Flomax and providing patient a voiding trial.  If patient fails voiding trial, patient will be followed by urologist as an outpatient.              > Continue to monitor     Diabetes Mellitus Type 2               > Glucophage 500 mg daily with breakfast      Hypertension              > Coreg 6.25 mg - held due to hypotensive episode              > Losartan 50 mg               > HCTZ 12.5 mg      Knee pain/osteoarthritis  > Continue Voltaren cream as needed    MEDICATIONS ON DISCHARGE:    Current Discharge Medication List        START taking these medications    Details   losartan (COZAAR) 50 mg tablet Take 1 Tablet by mouth daily for 30 days. Indications: high blood pressure  Qty: 30 Tablet, Refills: 0  Start date: 9/2/2022, End date: 10/2/2022      tamsulosin (FLOMAX) 0.4 mg capsule Take 2 Capsules by mouth daily for 30 days. Indications: urinary retension  Qty: 60 Capsule, Refills: 0  Start date: 9/2/2022, End date: 10/2/2022      metFORMIN (GLUCOPHAGE) 500 mg tablet Take 1 Tablet by mouth daily (with breakfast) for 30 days. Indications: type 2 diabetes mellitus  Qty: 30 Tablet, Refills: 0  Start date: 9/2/2022, End date: 10/2/2022      hydroCHLOROthiazide (HYDRODIURIL) 12.5 mg tablet Take 1 Tablet by mouth daily for 30 days. Indications: high blood pressure  Qty: 30 Tablet, Refills: 0  Start date: 9/2/2022, End date: 10/2/2022      L. acidophilus,casei,rhamnosus (BIO-K PLUS) 50 billion cell cpDR capsule Take 1 Capsule by mouth daily for 15 days. Qty: 15 Capsule, Refills: 0  Start date: 9/2/2022, End date: 9/17/2022      fluticasone propionate (FLONASE) 50 mcg/actuation nasal spray 2 Sprays by Both Nostrils route daily. Indications: inflammation of the nose due to an allergy  Qty: 1 Each, Refills: 0  Start date: 9/2/2022      levoFLOXacin (LEVAQUIN) 500 mg tablet Take 1 Tablet by mouth every twenty-four (24) hours for 4 days. Qty: 4 Tablet, Refills: 0  Start date: 9/3/2022, End date: 9/7/2022      !! OTHER Incentive Spirometer - Use as directed  Qty: 1 Each, Refills: 0  Start date: 9/2/2022      !! OTHER CBC, BMP, and Mg in 4 days. Result to PCP immediately DX: Acute Transverse Myelitis. Qty: 1 Each, Refills: 0  Start date: 9/2/2022       !! - Potential duplicate medications found. Please discuss with provider.         CONTINUE these medications which have NOT CHANGED    Details   calcium 500 mg tab Take  by mouth. fexofenadine (ALLEGRA) 180 mg tablet Take 180 mg by mouth daily. aspirin delayed-release 81 mg tablet Take 81 mg by mouth daily. atorvastatin (LIPITOR) 40 mg tablet Take 40 mg by mouth daily. calcium citrate-vitamin d3 (CITRACAL+D) 315 mg-5 mcg (200 unit) tab Take 1 tablet by mouth daily (with breakfast). meloxicam (MOBIC) 15 mg tablet Take 15 mg by mouth daily. STOP taking these medications       telmisartan (MICARDIS) 40 mg tablet Comments:   Reason for Stopping:         ciprofloxacin HCl (CIPRO) 500 mg tablet Comments:   Reason for Stopping:         omeprazole (PRILOSEC) 20 mg capsule Comments:   Reason for Stopping:                 DISCHARGE VITAL SIGNS:  Visit Vitals  BP (!) 140/88 (BP 1 Location: Right upper arm, BP Patient Position: At rest)   Pulse 85   Temp 97 °F (36.1 °C)   Resp 16   Ht 5' 4\" (1.626 m)   Wt 88.5 kg (195 lb)   SpO2 97%   Breastfeeding No   BMI 33.47 kg/m²       DISCHARGE PHYSICAL EXAMINATION:  GENERAL SURVEY: Patient is awake, alert, oriented x 3,  not in acute respiratory distress. CHEST/LUNGS: symmetrical chest expansion, good air entry, clear breath sounds  HEART: adynamic precordium, good S1 S2, no S3, regular rhythm, no murmurs  ABDOMEN: flat, bowel sounds appreciated, soft, non-tender, EXTREMITIES: No pedal edema. No erythema or edema of both knees. NEUROLOGICAL EXAM: Awake, no facial droop, follows verbal commands appropriately, motor strength 4-5 out of 5 in both lower extremities and 5-5 in both upper extremities, sensation touch normal currently in both upper extremities, no tremors noted. CONDITION ON DISCHARGE: Stable. DISPOSITION: Patient clinically improved and was discharged to home with home health physical therapy, occupational therapy, and skilled nursing. The patient is temporarily homebound secondary to functional deficits due to Acute Transverse Myelitis.  The patient can ambulate using rolling walker with 5-inch fixed wheels. The patient would benefit from continued skilled physical therapy to improve independent functional mobility within the home with use of least restrictive device. The patient would also benefit from continued skilled occupational therapy to improve self-care and functional mobility within the home with use of least restrictive device. Short-term skilled nursing is needed for medication reconciliation and disease education. Medical social worker for needs assessment and community resources. FOLLOW-UP RECOMMENDATIONS:   Follow-up Information       Follow up With Specialties Details Why Contact Info    uHmphrey Black MD Unknown Physician Specialty Follow up in 1 week(s)  [address] [facility] [address]      Ysabel Small MD Urology Follow up in 10 day(s) Call office to obtain scheduled appointment time/date. Already has appt according to Dr. Darinel Mccormick note. [address]      Yu Veliz MD Rheumatology Internal Medicine Follow up in 10 day(s) Pt. last seen by MD for neurological treatment prior to admit to ARU. Needs follow up for Acute Transvers Myelitis. [address]  [id number]  [phone]              OTHER INSTRUCTIONS:  1. Diet: Adult Diet Regular; 4 carb choices (60 gm/meal)  2. Activity. As tolerated. 3. Safety / fall precautions. TIME SPENT ON DISCHARGE ACTIVITIES: 38 minutes.       Signed:  Tacho Mackenzie NP    9/2/2022

## 2022-09-02 NOTE — PROGRESS NOTES
[facility]    Bedside and Verbal shift change report given to [facility] (oncoming nurse) by Balaji Mora RN (offgoing nurse). Report included the following information SBAR, Kardex, MAR and Recent Results. Situation:   Code Status: Full Code   Hospital Day: 10   Problem List:   Hospital Problems  Date Reviewed: 11/18/2019            Codes Class Noted POA    Idiopathic transverse myelitis ([facility] [address] ICD-10-CM: G37.3  ICD-9-CM: 341.22  8/23/2022 Unknown         Background:   Past Medical History:   Past Medical History:   Diagnosis Date    Allergic rhinitis     Cancer ([facility] [address] 1995    Uterine Cancer (radiation, surgery)    Diverticulosis     GERD (gastroesophageal reflux disease)     Hypercholesteremia     Hypertension     Steatosis of liver 2015        Assessment:  Changes in Assessment throughout shift: No change to previous assessment    Patient has a central line:  Reasons if yes: Insertion date: Last dressing date:  Patient has Valle Cath: no Reasons if yes:     Insertion date:   Last Vitals:     Vitals:    09/01/22 1636 09/01/22 2232 09/02/22 0715 09/02/22 1528   BP:  120/77 (!) 140/88 100/70   Pulse:  85 85 (!) 105   Resp:  18 16 14   Temp: 97.9 °F (36.6 °C) 98.2 °F (36.8 °C) 97 °F (36.1 °C) 97.4 °F (36.3 °C)   SpO2:  95% 97% 94%   Weight:       Height:           PAIN    Pain Assessment    Pain Intensity 1: 0 (09/02/22 1655) Pain Intensity 1: 2 (12/29/14 1105)      Pain Location 1: Abdomen      Pain Intervention(s) 1: Medication (see MAR)  Patient Stated Pain Goal: 0 Patient Stated Pain Goal: 0  Intervention effective: yes  Other actions taken for pain:      Skin Assessment  Skin color Skin Color: Appropriate for ethnicity  Condition/Temperature Skin Condition/Temp: Dry, Warm  Integrity Skin Integrity: Excoriation  Turgor    Weekly Pressure Ulcer Documentation  Pressure  Injury Documentation: No Pressure Injury Noted-Pressure Ulcer Prevention Initiated  Wound Prevention & Protection Methods  Orientation of wound Orientation of Wound Prevention: Posterior  Location of Prevention Location of Wound Prevention: Sacrum/Coccyx  Dressing Present Dressing Present : No  Dressing Status    Wound Offloading Wound Offloading (Prevention Methods): Bed, pressure reduction mattress    Wound Prevention Checklist  Precautions: Fall    []  Heel prevention boots placed on patient    []  Patient turned q2h during shift    []  Hermilo Order Set ordered    []  Patient on [place] bed/Specialty bed    []  Each Wound is documented during shift (Stage, Color, drainage, odor, measurements, and dressings)    [x]  Dual skin checks done at bedside during shift report with ISABELLA     INTAKE/OUPUT  Date 09/01/22 1900 - 09/02/22 0659 09/02/22 0700 - 09/03/22 0659   Shift 6413-3446 24 Hour Total 8519-1389 5645-2514 24 Hour Total   INTAKE   P.O.  1220 720  720     P. O.  1220 720  720   Shift Total(mL/kg)  1220(13.8) 720(8.1)  720(8.1)   OUTPUT   Urine(mL/kg/hr) 1350 2500 1100(1)  1100     Urine Voided 1350 2500 1100  1100     Urine Occurrence(s) 7 x 14 x 5 x  5 x   Stool          Stool Occurrence(s) 0 x 1 x 0 x  0 x   Shift Total(mL/kg) 1350(15.3) 2500(28.3) 1100(12.4)  1100(12.4)   NET -1350 -1280 -380  -380   Weight (kg) 88.5 88.5 88.5 88.5 88.5         Recommendations:  Patient needs and requests: TOILETING    Pending tests/procedures: LABS PENDING     Functional Level/Equipment: Partial (one person) / Héctor Abebe    Fall Precautions:   Fall risk precautions were reinforced with the patient; she was instructed to call for help prior to getting up. The following fall risk precautions were continued: bed/ chair alarms, door signage, yellow bracelet and socks as well as update of the Contur Pressman tool in the patient's room. Jose J Score: 4    HEALS Safety Check    A safety check occurred in the patient's room between off going nurse and oncoming nurse listed above.     The safety check included the below items  Area Items   H  High Alert Medications Verify all high alert medication drips (heparin, PCA, etc.)   E  Equipment Suction is set up for ALL patients (with ryan)  Red plugs utilized for all equipment (IV pumps, etc.)  WOWs wiped down at end of shift. Room stocked with oxygen, suction, and other unit-specific supplies   A  Alarms Bed alarm is set for fall risk patients  Ensure chair alarm is in place and activated if patient is up in a chair   L  Lines Check IV for any infiltration  Valle bag is empty if patient has a Valle   Tubing and IV bags are labeled   S  Safety   Room is clean, patient is clean, and equipment is clean. Hallways are clear from equipment besides carts. Fall bracelet on for fall risk patients  Ensure room is clear and free of clutter  Suction is set up for ALL patients (with ryan)  Hallways are clear from equipment besides carts.    Isolation precautions followed, supplies available outside room, sign posted     Rashid Fitzegrald RN

## 2022-09-02 NOTE — PROGRESS NOTES
Problem: Self Care Deficits Care Plan (Adult)  Goal: *Therapy Goal (Edit Goal, Insert Text)  Description: Occupational Therapy Goals   Long Term Goals  Initiated 8/24/2022 and to be accomplished within 2-3 week(s) 9/14/2022 (Reassessed on 9/2/2022 for dc)  1. Pt will perform self-feeding with Deb. (Goal met 9/2/2022)  2. Pt will perform grooming with Deb. (Goal met 9/2/2022)  3. Pt will perform UB bathing with Deb. (Goal partially met; set-up/ Mod I 9/2/2022)  4. Pt will perform LB bathing with Deb. (Goal not met 9/2/2022; currently set-up/ supv)  5. Pt will perform tub/shower transfer with Deb. (Goal not met 9/2/2022; currently CGA)  6. Pt will perform UB dressing with Deb. (Goal met 9/2/2022)  7. Pt will perform LB dressing with Deb. (Goal not met 9/2/2022; currently set-up/ supv)  8. Pt will perform toileting task with Deb. (Goal not met 9/2/2022; currently set-up/ supv))  9. Pt will perform toilet transfer with Deb. ((Goal not met 9/2/2022; currently SBA)  10. Pt will perform an IADL task with good safety and Deb. (Goal met 9/2/2022; simulated medication management task)    Short Term Goals   Initiated 8/24/2022 and to be accomplished within 7 day(s) 8/31/2022; updated 8/30/22 (Reassessed on 9/2/2022 for dc)  1. Pt will perform self-feeding with S (Goal met 8/30/22)  2. Pt will perform grooming with S. (Goal met 8/30/22)  3. Pt will perform UB bathing with S. (Goal met 9/2/2022)  4. Pt will perform LB bathing with S. (Goal met 9/2/2022)  5. Pt will perform tub/shower transfer with S. (Goal met 8/30/22)  6. Pt will perform UB dressing with S. (Goal met 9/2/2022)  7. Pt will perform LB dressing with S. (Goal met 9/2/2022)  8. Pt will perform toileting task with S. (Goal met 9/2/2022)  9.  Pt will perform toilet transfer with S.(GM 8/30/22); (SBA using RW 9/2/2022)         Outcome: Resolved/Not Met     Problem: Self Care Deficits Care Plan (Adult)  Goal: Interventions  Outcome: Resolved/Met OCCUPATIONAL THERAPY DISCHARGE    Patient: Teodoro Guajardo (89 y.o. female)  Date: 2022    First Tx Session  Time In: 5897  Time Out: 7286    Second Tx Session  Time In: 930 (caregiver edu session; patient's daughter in attendance)  Time Out: 959    Primary Diagnosis: Idiopathic transverse myelitis ([facility] [address] [G37.3] <principal problem not specified>    Precautions:  Fall precautions, Aspiration precautions, Safety    Barriers to Learning/Limitations: yes;  physical  Compensate with: visual, verbal, tactile, kinesthetic cues/model     Patient identified with name and : yes    SUBJECTIVE:   Patient stated I would like to wash my hair this morning.     OBJECTIVE DATA SUMMARY:     Past Medical History:   Diagnosis Date    Allergic rhinitis     Cancer ([facility] [address]     Uterine Cancer (radiation, surgery)    Diverticulosis     GERD (gastroesophageal reflux disease)     Hypercholesteremia     Hypertension     Steatosis of liver      Past Surgical History:   Procedure Laterality Date    COLONOSCOPY N/A 2019    COLONOSCOPY with polyp bx performed by Van Rg MD at [facility]    HX COLONOSCOPY      HX HYSTERECTOMY      HX OTHER SURGICAL      Liver biopsy     Prior Level of Function/Home Situation: Pre-Morbid Level of Function: I with ADLs     Home Situation  Home Environment: Private residence  # Steps to Enter: 4  Rails to Enter: Yes  Hand Rails : Bilateral  One/Two Story Residence: One story  Living Alone: Yes  Support Systems: Child(juventino), Friend/Neighbor  Patient Expects to be Discharged to[de-identified] Home with family assistance  Current DME Used/Available at Home: None  Tub or Shower Type: Tub/Shower combination  [x]     Right hand dominant   []     Left hand dominant    Therapeutic Exercise:  RUE/ LUE (orange) theraband exercises (15 reps x1) for shoulder flexion, shoulder abduction/ adduction, chest pull, diagonals, shoulder internal/ external rotation, and elbow flexion/ extension.  Verbal cues with demonstration for hand positioning and technique. Intermittent rest breaks due to fatigue. HEP handout provided. Pain:  Pt reports 0/10 pain or discomfort prior to treatment. Pt reports 0/10 pain or discomfort post treatment.    Problem List:    Decreased strength B UE  [x]     Decreased strength trunk/core  [x]     Decreased AROM   []     Decreased PROM  []     Decreased balance sitting  []     Decreased balance standing  [x]     Decreased endurance  [x]     Pain  []       Functional Limitations:   Decreased independence with ADL  [x]     Decreased independence with functional transfers  [x]     Decreased independence with ambulation  [x]     Decreased independence with IADL  [x]       Outcome Measures:      MMT Initial Assessment   Right Upper Extremity  Left Upper Extremity    UE AROM RUE AROM WFL; 4+/5 grossly LUE AROM WFL; 4+/5 grossly       MMT Discharge Assessment   Right Upper Extremity  Left Upper Extremity    UE AROM RUE AROM WFL LUE AROM WFL   Shoulder flexion 4/5 4/5   Shoulder extension 4/5 4/5   Shoulder ABDuction 4/5 4/5   Shoulder ADDUction 4/5 4/5   Elbow Flexion 4/5 4/5   Elbow Extension 4/5 4/5   Wrist Extension/Flexion 4/5 4/5    4/5 4/5       0/5 No palpable muscle contraction  1/5 Palpable muscle contraction, no joint movement  2-/5 Less than full range of motion in gravity eliminated position  2/5 Able to complete full range of motion in gravity eliminated position  2+/5 Able to initiate movement against gravity  3-/5 More than half but not full range of motion against gravity  3/5 Able to complete full range of motion against gravity  3+/5 Completes full range of motion against gravity with minimal resistance  4-/5 Completes full range of motion against gravity with minimal resistance  4/5 Completes full range of motion against gravity with moderate resistance  5/5 Completes full range of motion against gravity with maximum resistance    Coordination: RUE/ LUE [address] [facility]  Sensation: appears intact RUE/ LUE    FIM SCORES Initial Assessment Discharge Assessment   Eating 5 (set-up) Functional Level: 6 (Mod I)   Grooming 5 (setup EOB) Functional Level: 6 (Mod I)    Oral Hygiene FIM: 6    Bathing 4 (4 without LHS and 5 with LHS) Functional Level: 5 (set-up/ supv (ADL shower; tub transfer bench)  Body Parts Patient Bathed: Abdomen;Arm, left;Arm, right;Buttocks; Chest;Lower leg and foot, left; Lower leg and foot, right;Thigh, left;Sarina area; Thigh, right  Comments: UB bathing performed (Mod I/ set-up) seated on TTB; LB bathing performed (set-up/ supv) seated on TTB. Pt using LH spone to wash distal BLE's and her back. Pt used weight shifting (side to side) while seated on TTB to wash sarina-area and sacrum. Pt washed her hair (set-up/ Mod I) seated on TTB. Upper Body Dressing 5 Functional Level: 6 (Mod I)  Items Applied/Steps Completed: Bra (3 steps) (Button front short sleeve shirt (4 steps))  Comments: UB dressing performed (Mod I) for donning bra and button front short sleeve shirt; performed seated. Lower Body Dressing 5 (set-up EOB) Functional Level: 5 (set-up/ supv); Supv for aspects completed in stance. Comments: LB dressing performed (set-up/ supv) while seated on elevated seat over toilet; supv for aspects performed in stance (pants up/ down). Pt able to thread clothing over distal BLE's using crossed leg technique. Pt using grab bar for steadying when pulling pants up over hips to waist. Pt donned socks and sneakers (Mod I) seated in w/c using crossed leg technique. Pt require increased level of assistance during LB tasks 2/2 fatigue and endurance.     Sock and/or Shoe Management FIM: 6   Toileting 0 (not addressed; pt declined) Functional Level: 5 (supv)  Comments: Supv for CM (in stance); set-up/ Mod I for hygiene     Tub/Shower Transfer 0 (Not addressed 2/2 safety concern) Tub/Shower Transfer Score: 4 (Stand step (tub transfer bench) transfer (SBA/ CGA) gait belt; RW)  Comments: Stand step (tub transfer bench) transfer (SBA/ CGA) gait belt; RW (CGA 2/2 wet shower environment)     Toilet Transfer 5 (based on sqaut pivot from EOB to w/c) Toilet Transfer Score: 5 (Stand step transfer (SBA) RW; gait belt (elevated seat over toilet))  Comments: Stand step transfer (SBA) RW; gait belt (elevated seat over toilet)       Comprehension 5 Score: 6   Expression 5 Score: 6   Social Interaction 4 Score: 6   Problem Solving 5 Score: 5   Memory 5 Score: 5   Please see [facility] Interdisciplinary Eval: Coordination/Balance Section for details regarding FIM score description. Activity Tolerance:   Fair+; intermittent rest breaks due to fatigue. ASSESSMENT:  Patient demonstrated slow improvement and progression toward goals having achieved 9/9 STG's and 3/9 LTG's during her inpatient rehab stay. Pt progressed to performing UB ADL's (set-up/ Mod I); LB ADL's (set-up/ supv). Pt advanced to performing toileting (supv) for clothing management in stance; (set-up/ Mod I) for hygiene. Patient will benefit from home health occupational therapy to further progress (I) with ADL's/ IADL's, functional strength/ endurance, balance/ coordination, and functional mobility/ transfers using LRAD (RW) for increased (I) and safety with ADL's/ IADL's for return to PLOF. Caregiver edu session: patient and patient's daughter (Chiara) present during caregiver training session. Discussed patient's current level of assistance with ADL's. Hands on opportunity provided during functional transfer (RW level) to address toileting transfer and self-care needs. Cloth gait belt issued at this time. Reviewed energy conservation strategies for carryover to self-care/ ADL's (e.g. pacing, positioning, and pursed lip breathing); handout provided. Reviewed RUE/ LUE theraband HEP; handout provided during a previous treatment session. Patient and pt's daughter verbalizing understanding of information discussed.    Progression toward goals:  [] Improving appropriately and progressing toward goals  [x]      Improving slowly and progressing toward goals  []      Not making progress toward goals and plan of care will be adjusted     PLAN:  Pt would benefit from continued skilled occupational therapy in order to improve ADL function and IADL with use of least restrictive device. Interventions may include range of motion (AROM, PROM B UE/trunk), motor function (B UE/trunk strengthening/coordination), activity tolerance (vitals, oxygen saturation levels), ADL, balance activities, IADL, and functional transfer training. Discharge Recommendations:  Home Health occupational therapy with family assist  Further Equipment Recommendations for Discharge:  bedside commode, gait belt, transfer bench        Please refer to the flow sheet for vital signs taken during this treatment. After treatment:   [x]  Patient left in no apparent distress sitting up in wheelchair with needs met  []  Patient left in no apparent distress in bed  [x]  Call bell left within reach  [x]  Nursing notified  []  Caregiver present  [x]  Wheelchair alarm activated    COMMUNICATION/EDUCATION:   Communication/Collaboration:  [x]      Home safety education was provided and the patient/caregiver indicated understanding. [x]      Patient/family have participated as able and agree with findings and recommendations. []      Patient is unable to participate in plan of care at this time.     Mackenzie Muñiz  9/2/2022

## 2022-09-02 NOTE — PROGRESS NOTES
Problem: Mobility Impaired (Adult and Pediatric)  Goal: *Acute Goals and Plan of Care (Insert Text)  Description: Physical Therapy Short Term Goals  Initiated 8/24/2022, re-assessed 9/2/2022 for d/c (9/3/2022) (working toward long term goals)  1. Patient will move from supine to sit and sit to supine  in bed with modified independence. Goal MET 8/31/2022  2. Patient will transfer from bed to chair and chair to bed with supervision/set-up using the least restrictive device. (SBA with RW 9/2/2022)  3. Patient will perform sit to stand with maximal assistance. Goal MET 8/31/2022  4. Patient will propel w/c over level surfaces greater than or equal to 150 feet with modified independence. Goal MET 8/31/2022    Physical Therapy Long Term Goals  Initiated 8/24/2022 and to be accomplished within 21 day(s) (09/14/2022)  1. Patient will move from supine to sit and sit to supine , scoot up and down, and roll side to side in bed with independence.  (Dian 9/2/2022)  2. Patient will transfer from bed to chair and chair to bed with modified independence using the least restrictive device. (SBA with RW 9/2/2022)  3. Patient will perform sit to stand with minimal assistance/contact guard assist. Goal MET 8/31/2022  Updated goal: Patient will perform sit to stand with SBA. (MET 9/2/2022)  4. Patient will ambulate with moderate assistance  for 25 feet with the least restrictive device. (MET 9/2/2022)  5. Patient will ascend/descend 4 stairs with 1 handrail(s) with moderate assistance. Goal MET 8/31/2022  Updated goal: Patient will ascend/descend 4 stairs with 1 handrail with CGA. (MET 9/2/2022)  6. Patient will propel w/c over level surfaces, surfaces of varying grades, thresholds and ramps with modified independence. (MET 9/2/2022)      Outcome: Progressing Towards Goal   PHYSICAL THERAPY DISCHARGE NOTE    Patient: Donnie Sims (42 y.o. female)  Date: 9/2/2022  Diagnosis: Idiopathic transverse myelitis ([facility] [address] [G37.3] <principal problem not specified>  Precautions: Fall  Chart, physical therapy assessment, plan of care and goals were reviewed. Time in: 1005  Time out: 1030  Pt seen for: family training, gait training, stair training    Time in:1400  Time out:1505    Patient seen for: Balance activities;Transfer training;Gait training; Therapeutic exercise (bed mobility)    Pain:  Pt pain was reported as  no c/o pre-treatment. Pt pain was reported as no c/o post-treatment. Intervention: NA     Patient identified with name and : yes     SUBJECTIVE:     Patient stated: \"It nomi freaked me out\" regarding ambulating without CGA from therapist.     OBJECTIVE DATA SUMMARY:     GROSS ASSESSMENT Discharge Assessment 2022   AROM Generally decreased, functional   Strength Generally decreased, functional   Coordination Generally decreased, functional   Tone Normal   Sensation Impaired   PROM Within functional limits       POSTURE Discharge Assessment 2022   Posture (WDL) Exceptions to [facility]   Posture Assessment Rounded shoulders           BALANCE Discharge Assessment 2022    Sitting - Static: Good (unsupported)  Sitting - Dynamic: Good (unsupported)  Standing - Static: Fair  Standing - Dynamic : Impaired       BED/CHAIR/WHEELCHAIR TRANSFERS Initial Assessment Discharge Assessment   Rolling Right 6 (Modified independent) 6 (Modified independent)   Rolling Left 6 (Modified independent) 6 (Modified independent)   Supine to Sit 5 (Supervision) 6 (Modified independent)   Sit to Stand Unable at this time Stand-by assistance   Sit to Supine 5 (Supervision) 6 (Modified independent)   Transfer Assistance Level 1 (Total assistance) (unable to achieve stand for stand step,, was able to perform lateral transfer with minimal physical assistance and maximal verbal cuing utilizing squat pivot technique with multiple attempts at clearing sacrum as pt does not maintain sacral clearance.) 4 (Contact guard assistance)   Transfer Type Other Other   Comments    Pt performed sit to stand with SBA and pt pushing up from B arm rests. Pt requires occasional v/c for proper hand placement with sit<->stand for safety. Pt performed stand step txfr with RW and SBA. Car Transfer  (CGA using RW, cues for safe hand placement initially.)  (CGA) for sit to stand from low car seat. Pt ingress and egress BLE Dian. Car Type car transfer simulator car txfr simulator       WHEELCHAIR MOBILITY/MANAGEMENT Initial Assessment Discharge Assessment   Able to Propel 186 feet 186 feet   Assistance Level 4 Dian with BLE   Curbs/ramps assistance required 0 (Not tested) 0 (Not tested)   Wheelchair set up assistance required 3 (Moderate assistance) 6 (Modified independent)   Wheelchair management Manages left brake, Manages right brake Manages left brake;Manages right brake       GAIT Discharge Assessment 9/2/2022   Gait Description (WDL) Exceptions to WDL   Gait Abnormalities Decreased step clearance       WALKING INDEPENDENCE Initial Assessment Discharge Assessment   Assistive device Gait belt, Walker, rolling Walker, rolling;Gait belt   Ambulation assistance - level surface  (Unable at this time) 5 (Stand-by assistance)   Distance 0 Feet (ft) 80 Feet (ft)   Comments    Pt ambulated 50ft during family training with RW and CGA. During PM session, pt ambulated 80ft with RW and SBA. Pt performed reciprocal gait pattern. Ambulation assistance - unlevel surface  (not tested today)  Pt ambulated 115ft with RW and CGA outside in courtyard on various uneven pavements. STEPS/STAIRS Initial Assessment Discharge Assessment   Steps/Stairs ambulated  (Unable at this time) 4 (6\" steps and 2 6\" steps)   Rail Use Both (2 rails progressing to 1 railing using sidestepping method (leading right LE up step)) Left  (side stepping)   Assistance Level   4 (Contact guard assistance)   Comments    Pt negotiated up and down 4 6\" steps with left HR performing side stepping with CGA.  Pt's daughter performed return demo of providing CGA for pt to negotiate 2 6\" steps with left HR side stepping. Curbs/Ramps  (not tested today) Pt negotiated up and down 2 4\" platform steps to simulate threshold, with RW and daughter providing CGA, pt ascending with right LE and descending with left LE. Therapeutic Exercises:   Supine bridging 2x10, BLE heel slides and hip abd 2x15    Group therapy treatment:      Standing balance  Activity   Sets   Reps   Time Spent   Comments   [] Beach ball toss/catch       [] Balloon tapping       [x] Forward/multi-directional reaching with tabletop activity    [id number] With RW and SBA/Supervision for standing balance and tolerance. [] Bean bag toss to central target       [] Ball tossing into basketball hoop         [x]   Patient appropriate to continue group therapy sessions to promote increased participation in skilled therapy interventions and to provide opportunities for increased social interaction. ASSESSMENT:  Pt has made progress and met 3/4 STGs and 4/6 LTGs. Pt continues to demo decreased activity tolerance with gait and stair negotiation, requiring increased recovery time. Pt continues to benefit from SBA for sit to stand and txfrs with RW for safety and v/c for proper hand placement. Pt's daughter demo'd competency with assisting pt with txfrs and gait. LTGs: met 4/6     PLAN:  Pt would benefit from continued skilled physical therapy in order to improve independent functional mobility at home health level. Interventions may include range of motion (AROM, PROM B LE/trunk), motor function (B LE/trunk strengthening/coordination), activity tolerance (vitals, oxygen saturation levels), bed mobility training, balance activities, gait training (progressive ambulation program), and functional transfer training.      Discharge Recommendations:  Home Health  Further Equipment Recommendations for Discharge:  rolling walker and wheelchair 18 inch       Activity Tolerance:   Fair+  Please refer to the flowsheet for vital signs taken during this treatment.   After treatment:   [] Patient left in no apparent distress in bed  [x] Patient left in no apparent distress sitting up in chair  [] Patient left in no apparent distress in w/c mobilizing under own power  [] Patient left in no apparent distress dining area  [] Patient left in no apparent distress mobilizing under own power  [x] Call bell left within reach  [] Nursing notified  [] Caregiver present  [] Bed alarm activated   [x] Chair alarm activated      Sudeep Yang, ELIZ  9/2/2022

## 2022-09-02 NOTE — PROGRESS NOTES
Problem: Falls - Risk of  Goal: *Absence of Falls  Description: Document French Genao Fall Risk and appropriate interventions in the flowsheet. Outcome: Progressing Towards Goal  Note: Fall Risk Interventions:  Mobility Interventions: Assess mobility with egress test, Bed/chair exit alarm, Strengthening exercises (ROM-active/passive), Utilize walker, cane, or other assistive device, Utilize gait belt for transfers/ambulation, Patient to call before getting OOB    Mentation Interventions: Bed/chair exit alarm, Door open when patient unattended, More frequent rounding, Reorient patient, Toileting rounds, Room close to nurse's station, Gait belt with transfers/ambulation, Familiar objects from home    Medication Interventions: Evaluate medications/consider consulting pharmacy, Patient to call before getting OOB, Teach patient to arise slowly, Utilize gait belt for transfers/ambulation, Bed/chair exit alarm    Elimination Interventions: Bed/chair exit alarm    History of Falls Interventions: Bed/chair exit alarm, Investigate reason for fall, Room close to nurse's station, Utilize gait belt for transfer/ambulation         Problem: Patient Education: Go to Patient Education Activity  Goal: Patient/Family Education  Outcome: Progressing Towards Goal     Problem: Pain  Goal: *Control of Pain  Outcome: Progressing Towards Goal     Problem: Patient Education: Go to Patient Education Activity  Goal: Patient/Family Education  Outcome: Progressing Towards Goal     Problem: Diabetes Self-Management  Goal: *Disease process and treatment process  Description: Define diabetes and identify own type of diabetes; list 3 options for treating diabetes. Outcome: Progressing Towards Goal  Goal: *Incorporating nutritional management into lifestyle  Description: Describe effect of type, amount and timing of food on blood glucose; list 3 methods for planning meals.   Outcome: Progressing Towards Goal  Goal: *Incorporating physical activity into lifestyle  Description: State effect of exercise on blood glucose levels. Outcome: Progressing Towards Goal  Goal: *Using medications safely  Description: State effect of diabetes medications on diabetes; name diabetes medication taking, action and side effects. Outcome: Progressing Towards Goal  Goal: *Monitoring blood glucose, interpreting and using results  Description: Identify recommended blood glucose targets  and personal targets. Outcome: Progressing Towards Goal  Goal: *Prevention, detection, treatment of acute complications  Description: List symptoms of hyper- and hypoglycemia; describe how to treat low blood sugar and actions for lowering  high blood glucose level. Outcome: Progressing Towards Goal  Goal: *Prevention, detection and treatment of chronic complications  Description: Define the natural course of diabetes and describe the relationship of blood glucose levels to long term complications of diabetes. Outcome: Progressing Towards Goal  Goal: *Developing strategies to address psychosocial issues  Description: Describe feelings about living with diabetes; identify support needed and support network  Outcome: Progressing Towards Goal     Problem: Patient Education: Go to Patient Education Activity  Goal: Patient/Family Education  Outcome: Progressing Towards Goal     Problem: Pressure Injury - Risk of  Goal: *Prevention of pressure injury  Description: Document Hermilo Scale and appropriate interventions in the flowsheet.   Outcome: Progressing Towards Goal  Note: Pressure Injury Interventions:  Sensory Interventions: Assess changes in LOC, Assess need for specialty bed, Avoid rigorous massage over bony prominences, Monitor skin under medical devices, Minimize linen layers, Float heels, Pad between skin to skin, Pressure redistribution bed/mattress (bed type), Chair cushion    Moisture Interventions: Absorbent underpads, Apply protective barrier, creams and emollients, Limit adult briefs, Minimize layers, Moisture barrier, Maintain skin hydration (lotion/cream)    Activity Interventions: Assess need for specialty bed, Chair cushion, Increase time out of bed, Pressure redistribution bed/mattress(bed type), PT/OT evaluation    Mobility Interventions: Assess need for specialty bed, Chair cushion, Float heels, HOB 30 degrees or less, PT/OT evaluation    Nutrition Interventions: Document food/fluid/supplement intake, Discuss nutritional consult with provider    Friction and Shear Interventions: Apply protective barrier, creams and emollients, Feet elevated on foot rest, HOB 30 degrees or less, Lift sheet, Minimize layers                Problem: Patient Education: Go to Patient Education Activity  Goal: Patient/Family Education  Outcome: Progressing Towards Goal     Problem: Patient Education: Go to Patient Education Activity  Goal: Patient/Family Education  Outcome: Progressing Towards Goal     Problem: Inpatient Rehab (Adult)  Goal: *LTG: Avoids injury/falls 100% of time related to deficits  Outcome: Progressing Towards Goal  Goal: *LTG: Avoids infection 100% of time related to deficits  Outcome: Progressing Towards Goal  Goal: *LTG: Absence of DVT during hospitalization  Outcome: Progressing Towards Goal  Goal: *LTG: Maintains Skin Integrity With No Evidence of Pressure Injury 100% of Time  Outcome: Progressing Towards Goal  Goal: Interventions  Outcome: Progressing Towards Goal     Problem: Patient Education: Go to Patient Education Activity  Goal: Patient/Family Education  Outcome: Progressing Towards Goal

## 2022-09-03 VITALS
OXYGEN SATURATION: 96 % | WEIGHT: 195 LBS | HEART RATE: 77 BPM | HEIGHT: 64 IN | BODY MASS INDEX: 33.29 KG/M2 | TEMPERATURE: 98 F | SYSTOLIC BLOOD PRESSURE: 147 MMHG | RESPIRATION RATE: 16 BRPM | DIASTOLIC BLOOD PRESSURE: 80 MMHG

## 2022-09-03 LAB — GLUCOSE BLD STRIP.AUTO-MCNC: 105 MG/DL (ref 70–110)

## 2022-09-03 PROCEDURE — 74011250637 HC RX REV CODE- 250/637: Performed by: NURSE PRACTITIONER

## 2022-09-03 PROCEDURE — 82962 GLUCOSE BLOOD TEST: CPT

## 2022-09-03 PROCEDURE — 74011250637 HC RX REV CODE- 250/637: Performed by: EMERGENCY MEDICINE

## 2022-09-03 RX ADMIN — LEVOFLOXACIN 500 MG: 500 TABLET, FILM COATED ORAL at 11:16

## 2022-09-03 RX ADMIN — Medication 1 TABLET: at 08:26

## 2022-09-03 RX ADMIN — Medication 1 CAPSULE: at 08:26

## 2022-09-03 RX ADMIN — TAMSULOSIN HYDROCHLORIDE 0.8 MG: 0.4 CAPSULE ORAL at 08:26

## 2022-09-03 RX ADMIN — LOSARTAN POTASSIUM 50 MG: 50 TABLET, FILM COATED ORAL at 08:26

## 2022-09-03 RX ADMIN — METFORMIN HYDROCHLORIDE 500 MG: 500 TABLET ORAL at 08:26

## 2022-09-03 RX ADMIN — TIMOLOL MALEATE 1 DROP: 2.5 SOLUTION OPHTHALMIC at 08:23

## 2022-09-03 RX ADMIN — HYDROCHLOROTHIAZIDE 12.5 MG: 25 TABLET ORAL at 08:26

## 2022-09-03 NOTE — REHAB NOTE
Jami Do 78 y.o. female was discharged home on 09/03/22. Patient's armband removed and given to patient to take home. Patient was informed of the privacy risks if armband lost or stolen. Discharge instructions were reviewed with patient and guardian, and she verbalized understanding. The patient was wheeled out to transportation vehicle by a staff member along with the patient's belongings. Transportation was provided by private vehicle.     Benny Chavira RN

## 2022-09-03 NOTE — PROGRESS NOTES
Problem: Falls - Risk of  Goal: *Absence of Falls  Description: Document Jaydon Romero Fall Risk and appropriate interventions in the flowsheet. Outcome: Resolved/Met     Problem: Patient Education: Go to Patient Education Activity  Goal: Patient/Family Education  Outcome: Resolved/Met     Problem: Pain  Goal: *Control of Pain  Outcome: Resolved/Met     Problem: Patient Education: Go to Patient Education Activity  Goal: Patient/Family Education  Outcome: Resolved/Met     Problem: Diabetes Self-Management  Goal: *Disease process and treatment process  Description: Define diabetes and identify own type of diabetes; list 3 options for treating diabetes. Outcome: Resolved/Met  Goal: *Incorporating nutritional management into lifestyle  Description: Describe effect of type, amount and timing of food on blood glucose; list 3 methods for planning meals. Outcome: Resolved/Met  Goal: *Incorporating physical activity into lifestyle  Description: State effect of exercise on blood glucose levels. Outcome: Resolved/Met  Goal: *Developing strategies to promote health/change behavior  Description: Define the ABC's of diabetes; identify appropriate screenings, schedule and personal plan for screenings. Outcome: Resolved/Met  Goal: *Using medications safely  Description: State effect of diabetes medications on diabetes; name diabetes medication taking, action and side effects. Outcome: Resolved/Met  Goal: *Monitoring blood glucose, interpreting and using results  Description: Identify recommended blood glucose targets  and personal targets. Outcome: Resolved/Met  Goal: *Prevention, detection, treatment of acute complications  Description: List symptoms of hyper- and hypoglycemia; describe how to treat low blood sugar and actions for lowering  high blood glucose level.   Outcome: Resolved/Met  Goal: *Prevention, detection and treatment of chronic complications  Description: Define the natural course of diabetes and describe the relationship of blood glucose levels to long term complications of diabetes. Outcome: Resolved/Met  Goal: *Developing strategies to address psychosocial issues  Description: Describe feelings about living with diabetes; identify support needed and support network  Outcome: Resolved/Met  Goal: *Insulin pump training  Outcome: Resolved/Met  Goal: *Sick day guidelines  Outcome: Resolved/Met  Goal: *Patient Specific Goal (EDIT GOAL, INSERT TEXT)  Outcome: Resolved/Met     Problem: Patient Education: Go to Patient Education Activity  Goal: Patient/Family Education  Outcome: Resolved/Met     Problem: Pressure Injury - Risk of  Goal: *Prevention of pressure injury  Description: Document Hermilo Scale and appropriate interventions in the flowsheet.   Outcome: Resolved/Met     Problem: Patient Education: Go to Patient Education Activity  Goal: Patient/Family Education  Outcome: Resolved/Met     Problem: Patient Education: Go to Patient Education Activity  Goal: Patient/Family Education  Outcome: Resolved/Met     Problem: Inpatient Rehab (Adult)  Goal: *LTG: Avoids injury/falls 100% of time related to deficits  Outcome: Resolved/Met  Goal: *LTG: Avoids infection 100% of time related to deficits  Outcome: Resolved/Met  Goal: *LTG: Absence of DVT during hospitalization  Outcome: Resolved/Met  Goal: *LTG: Maintains Skin Integrity With No Evidence of Pressure Injury 100% of Time  Outcome: Resolved/Met  Goal: Interventions  Outcome: Resolved/Met     Problem: Patient Education: Go to Patient Education Activity  Goal: Patient/Family Education  Outcome: Resolved/Met

## 2022-09-03 NOTE — PROGRESS NOTES
SHIFT CHANGE NOTE FOR [facility]    Bedside and Verbal shift change report given to MISHA Peralta(oncoming nurse) by Rey Ortiz RN (offgoing nurse). Report included the following information SBAR, Kardex, MAR and Recent Results. Situation:   Code Status: Full Code   Hospital Day: 11   Problem List:   Hospital Problems  Date Reviewed: 11/18/2019            Codes Class Noted POA    Idiopathic transverse myelitis ([facility] [address] ICD-10-CM: G37.3  ICD-9-CM: 341.22  8/23/2022 Unknown       Background:   Past Medical History:   Past Medical History:   Diagnosis Date    Allergic rhinitis     Cancer ([facility] [address] 1995    Uterine Cancer (radiation, surgery)    Diverticulosis     GERD (gastroesophageal reflux disease)     Hypercholesteremia     Hypertension     Steatosis of liver 2015        Assessment:  Changes in Assessment throughout shift: No change to previous assessment    Patient has a central line:  Reasons if yes: Insertion date: Last dressing date:  Patient has Valle Cath: no Reasons if yes:     Insertion date:   Last Vitals:     Vitals:    09/01/22 2232 09/02/22 0715 09/02/22 1528 09/02/22 2000   BP: 120/77 (!) 140/88 100/70 117/77   Pulse: 85 85 (!) 105 90   Resp: 18 16 14 18   Temp: 98.2 °F (36.8 °C) 97 °F (36.1 °C) 97.4 °F (36.3 °C) 98 °F (36.7 °C)   SpO2: 95% 97% 94% 96%   Weight:       Height:           PAIN    Pain Assessment    Pain Intensity 1: 0 (09/03/22 0400) Pain Intensity 1: 2 (12/29/14 1105)      Pain Location 1: Abdomen      Pain Intervention(s) 1: Medication (see MAR)  Patient Stated Pain Goal: 0 Patient Stated Pain Goal: 0  Intervention effective: yes  Other actions taken for pain:      Skin Assessment  Skin color Skin Color: Appropriate for ethnicity  Condition/Temperature Skin Condition/Temp: Dry, Warm  Integrity Skin Integrity: Excoriation  Turgor    Weekly Pressure Ulcer Documentation  Pressure  Injury Documentation: No Pressure Injury Noted-Pressure Ulcer Prevention Initiated  Wound Prevention & Protection Methods  Orientation of wound Orientation of Wound Prevention: Posterior  Location of Prevention Location of Wound Prevention: Sacrum/Coccyx  Dressing Present Dressing Present : No  Dressing Status    Wound Offloading Wound Offloading (Prevention Methods): Bed, pressure redistribution/air    Wound Prevention Checklist  Precautions: Fall    []  Heel prevention boots placed on patient    []  Patient turned q2h during shift    []  Hermilo Order Set ordered    []  Patient on [place] bed/Specialty bed    []  Each Wound is documented during shift (Stage, Color, drainage, odor, measurements, and dressings)    [x]  Dual skin checks done at bedside during shift report with Kathryn    INTAKE/OUPUT  Date 09/02/22 0700 - 09/03/22 0659 09/03/22 0700 - 09/04/22 0659   Shift 1381-4466 0605-5832 24 Hour Total 6779-7153 0665-7083 24 Hour Total   INTAKE   P.O. 720  720        P. O. 720  720      Shift Total(mL/kg) 720(8.1)  720(8.1)      OUTPUT   Urine(mL/kg/hr) 1100(1)  1100        Urine Voided 1100  1100        Urine Occurrence(s) 5 x  5 x      Stool           Stool Occurrence(s) 0 x  0 x      Shift Total(mL/kg) 1100(12.4)  1100(12.4)      NET -380  -380      Weight (kg) 88.5 88.5 88.5 88.5 88.5 88.5         Recommendations:  Patient needs and requests: TOILETING    Pending tests/procedures: None    Functional Level/Equipment: Partial (one person) / Héctor Abebe    Fall Precautions:   Fall risk precautions were reinforced with the patient; she was instructed to call for help prior to getting up. The following fall risk precautions were continued: bed/ chair alarms, door signage, yellow bracelet and socks as well as update of the [employer]man tool in the patient's room. Jose J Score: 4    HEALS Safety Check    A safety check occurred in the patient's room between off going nurse and oncoming nurse listed above.     The safety check included the below items  Area Items   H  High Alert Medications Verify all high alert medication drips (heparin, PCA, etc.)   E  Equipment Suction is set up for ALL patients (with ryan)  Red plugs utilized for all equipment (IV pumps, etc.)  WOWs wiped down at end of shift. Room stocked with oxygen, suction, and other unit-specific supplies   A  Alarms Bed alarm is set for fall risk patients  Ensure chair alarm is in place and activated if patient is up in a chair   L  Lines Check IV for any infiltration  Valle bag is empty if patient has a Valle   Tubing and IV bags are labeled   S  Safety   Room is clean, patient is clean, and equipment is clean. Hallways are clear from equipment besides carts. Fall bracelet on for fall risk patients  Ensure room is clear and free of clutter  Suction is set up for ALL patients (with ryan)  Hallways are clear from equipment besides carts.    Isolation precautions followed, supplies available outside room, sign posted     Freddy Ferro RN

## 2022-09-03 NOTE — PROGRESS NOTES
Problem: Falls - Risk of  Goal: *Absence of Falls  Description: Document Reinaldo Redman Fall Risk and appropriate interventions in the flowsheet. Outcome: Progressing Towards Goal  Note: Fall Risk Interventions:  Mobility Interventions: Patient to call before getting OOB    Mentation Interventions: Bed/chair exit alarm, Evaluate medications/consider consulting pharmacy    Medication Interventions: Bed/chair exit alarm, Evaluate medications/consider consulting pharmacy, Patient to call before getting OOB    Elimination Interventions: Call light in reach, Bed/chair exit alarm, Patient to call for help with toileting needs    History of Falls Interventions: Evaluate medications/consider consulting pharmacy, Bed/chair exit alarm         Problem: Patient Education: Go to Patient Education Activity  Goal: Patient/Family Education  Outcome: Progressing Towards Goal     Problem: Pain  Goal: *Control of Pain  Outcome: Progressing Towards Goal     Problem: Patient Education: Go to Patient Education Activity  Goal: Patient/Family Education  Outcome: Progressing Towards Goal     Problem: Diabetes Self-Management  Goal: *Disease process and treatment process  Description: Define diabetes and identify own type of diabetes; list 3 options for treating diabetes. Outcome: Progressing Towards Goal  Goal: *Incorporating nutritional management into lifestyle  Description: Describe effect of type, amount and timing of food on blood glucose; list 3 methods for planning meals. Outcome: Progressing Towards Goal  Goal: *Incorporating physical activity into lifestyle  Description: State effect of exercise on blood glucose levels. Outcome: Progressing Towards Goal  Goal: *Developing strategies to promote health/change behavior  Description: Define the ABC's of diabetes; identify appropriate screenings, schedule and personal plan for screenings.   Outcome: Progressing Towards Goal  Goal: *Using medications safely  Description: State effect of diabetes medications on diabetes; name diabetes medication taking, action and side effects. Outcome: Progressing Towards Goal  Goal: *Monitoring blood glucose, interpreting and using results  Description: Identify recommended blood glucose targets  and personal targets. Outcome: Progressing Towards Goal  Goal: *Prevention, detection, treatment of acute complications  Description: List symptoms of hyper- and hypoglycemia; describe how to treat low blood sugar and actions for lowering  high blood glucose level. Outcome: Progressing Towards Goal     Problem: Patient Education: Go to Patient Education Activity  Goal: Patient/Family Education  Outcome: Progressing Towards Goal     Problem: Pressure Injury - Risk of  Goal: *Prevention of pressure injury  Description: Document Hermilo Scale and appropriate interventions in the flowsheet.   Outcome: Progressing Towards Goal  Note: Pressure Injury Interventions:  Sensory Interventions: Assess changes in LOC    Moisture Interventions: Absorbent underpads    Activity Interventions: Chair cushion, Pressure redistribution bed/mattress(bed type), Increase time out of bed    Mobility Interventions: Chair cushion, Pressure redistribution bed/mattress (bed type)    Nutrition Interventions: Document food/fluid/supplement intake    Friction and Shear Interventions: HOB 30 degrees or less                Problem: Patient Education: Go to Patient Education Activity  Goal: Patient/Family Education  Outcome: Progressing Towards Goal     Problem: Patient Education: Go to Patient Education Activity  Goal: Patient/Family Education  Outcome: Progressing Towards Goal     Problem: Inpatient Rehab (Adult)  Goal: *LTG: Avoids injury/falls 100% of time related to deficits  Outcome: Progressing Towards Goal  Goal: *LTG: Avoids infection 100% of time related to deficits  Outcome: Progressing Towards Goal  Goal: *LTG: Absence of DVT during hospitalization  Outcome: Progressing Towards Goal  Goal: *LTG: Maintains Skin Integrity With No Evidence of Pressure Injury 100% of Time  Outcome: Progressing Towards Goal  Goal: Interventions  Outcome: Progressing Towards Goal     Problem: Patient Education: Go to Patient Education Activity  Goal: Patient/Family Education  Outcome: Progressing Towards Goal

## 2022-09-03 NOTE — HOME CARE
Completed initiated referral- [facility] orders for SN/PT/OT/HHA. DME orders noted for TTB; BSC; Erlin Phenes; Baljinder-ht wide wheelchair. Sent to central intake to be processed.      --  Rony Mathis LPN  [facility]

## 2022-09-04 ENCOUNTER — HOME CARE VISIT (OUTPATIENT)
Dept: SCHEDULING | Facility: HOME HEALTH | Age: 77
End: 2022-09-04
Payer: MEDICARE

## 2022-09-04 VITALS
SYSTOLIC BLOOD PRESSURE: 125 MMHG | RESPIRATION RATE: 18 BRPM | OXYGEN SATURATION: 99 % | DIASTOLIC BLOOD PRESSURE: 85 MMHG | TEMPERATURE: 97.5 F | HEART RATE: 91 BPM

## 2022-09-04 PROCEDURE — G0151 HHCP-SERV OF PT,EA 15 MIN: HCPCS

## 2022-09-04 PROCEDURE — 400013 HH SOC

## 2022-09-04 PROCEDURE — 3331090001 HH PPS REVENUE CREDIT

## 2022-09-04 PROCEDURE — 400018 HH-NO PAY CLAIM PROCEDURE

## 2022-09-04 PROCEDURE — G0299 HHS/HOSPICE OF RN EA 15 MIN: HCPCS

## 2022-09-04 PROCEDURE — 3331090002 HH PPS REVENUE DEBIT

## 2022-09-04 NOTE — HOME HEALTH
Skilled services/Home bound verification:     Skilled Reason for admission/summary of clinical condition:  68-year-old female with a past medical history of hypertension, lipidemia, uterine cancer presents as a transfer from [facility] for bilateral paresthesias and decreased rectal sensation. Neurosurgery and neurology were consulted and patient was diagnosed to have transverse myelitis with an expansile T2 hyperintense lesion with enhancement that spans between T10-T12. Hospital transfers myelitis remains unclear. .  This patient is homebound for the following reasons Requires considerable and taxing effort to leave the home  and Requires the assistance of 1 or more persons to leave the home . Caregiver: relative. Caregiver assists with IADL's. Medications reconciled and all medications are available in the home this visit. [employer] health supplies by type and quantity ordered/delivered this visit include: none    Patient education provided this visit to include: HEP, fall prevention, dc planning . Patient level of understanding of education provided: Patient was able to partially teach back HEP     Sharps Education Provided: n/a  Patient response to procedure performed:  Patient needed visual cues with proper technique with ambulation using RW     Pt/Caregiver instructed on plan of care and are agreeable to plan of care at this time. PMHx: Diverticulosis      GERD (gastroesophageal reflux disease)      Hypercholesteremia      Hypertension   S: complained of weakness on BLE, impaired ability for ambulation and transfers   O:Patients Goals= Be able to go back to PLOF  Wound/Incision: location, description, drainage: none  PLOF: Lives by herself in a 1 level house, prior to hospitalization, she was independent in all aspects of ADLs and IADLs and did not use any AD for gait and was able to drive. Her daughters are taking turns in assisting patient at this time.    STRENGT L hip flexor, extensor, hip abductors, adductors, knee flexor and extensor 3/5, R hip flexor, extensor, hip abductors, adductors knee flexor and extensor 3+/5  FTSTS 36 seconds  TUG 38 seconds   BALANCE Tinetti 11/28 high fall risk due to reduced strength on BLE, gait deviation and decreased efficiency of balance reactions. Patient demonstrated poor use of hip and ankle strategy during standing balance activity. Patient requires support from AD at all times for safety and stability. GAIT Patient was able to ambulate with RW x 30 feet with Min A with noted slow, shuffling gait, forward stooped posture and with wide COLIN. patient was educated with importance of using AD at all times and maintaining clear mobility pathway and upright posture   BED MOBILITY patient needed Min A x1 with bed mobility   TRANSFERS patient needed Min A x1 with bed, chair and toilet using RW   A: PT evaluation completed with the presence of daughter  who is the primary CG, POC established, Med rec done, HEP established  P:Home Safety eval/recommendations: Home health physical therapy initial evaluation performed. Patient demonstrates decreased strength, balance, and endurance which increases patient's overall fall risk and burden of care. Patient would benefit from home health physical therapy to improve balance, strength, and endurance which would decrease fall risk and allow patient to return to prior level of function once all functional goals or full rehab potential is met. patient educated with HEP including seated hip flex, knee extension, hip abduction, hip adduction, ankle PF and DF and ball squeeze x 20 reps x 3 sets daily to improve MMT on BLE to facilitate with improved bed mobility, transfers and gait with AD. patient also educated with deep breathing exercises to be done daily x 10 reps x 3 sets to prevent SOB during activity.  Patient educated with fall prevention technique by decluttering space, proper use of AD and footwear

## 2022-09-05 PROCEDURE — 3331090001 HH PPS REVENUE CREDIT

## 2022-09-05 PROCEDURE — 3331090002 HH PPS REVENUE DEBIT

## 2022-09-06 ENCOUNTER — HOME CARE VISIT (OUTPATIENT)
Dept: SCHEDULING | Facility: HOME HEALTH | Age: 77
End: 2022-09-06
Payer: MEDICARE

## 2022-09-06 ENCOUNTER — HOME CARE VISIT (OUTPATIENT)
Dept: HOME HEALTH SERVICES | Facility: HOME HEALTH | Age: 77
End: 2022-09-06
Payer: MEDICARE

## 2022-09-06 VITALS
TEMPERATURE: 97.5 F | SYSTOLIC BLOOD PRESSURE: 125 MMHG | RESPIRATION RATE: 18 BRPM | DIASTOLIC BLOOD PRESSURE: 85 MMHG | OXYGEN SATURATION: 99 % | HEART RATE: 91 BPM

## 2022-09-06 VITALS
HEART RATE: 95 BPM | RESPIRATION RATE: 16 BRPM | SYSTOLIC BLOOD PRESSURE: 140 MMHG | DIASTOLIC BLOOD PRESSURE: 80 MMHG | OXYGEN SATURATION: 97 % | TEMPERATURE: 98 F

## 2022-09-06 PROCEDURE — 3331090001 HH PPS REVENUE CREDIT

## 2022-09-06 PROCEDURE — 3331090002 HH PPS REVENUE DEBIT

## 2022-09-06 PROCEDURE — G0152 HHCP-SERV OF OT,EA 15 MIN: HCPCS

## 2022-09-06 NOTE — Clinical Note
Mrs. Sanjuanita Morales presents with good rehab potential to increase her balance, strength and endurance for completion of her daily routine. Pt is currently using rolling walker for support when navigating her house and with increased difficulty with completion toilet transfers. She was previously completely independent with ADLs, IADLs and functional mobility with pt highly motivated to return to her PLOF. She is agreeable to continued home health skilled occupational therapy services to increase her safety and independence within her home.     PLAN: D/C 1w1, 2w2 with plans to discharge when goals are met or maximal potential achieved

## 2022-09-06 NOTE — HOME HEALTH
Skilled Reason for admission/summary of clinical condition: 46-year-old female with a past medical history of hypertension, lipidemia, uterine cancer presents as a transfer from [place] for bilateral paresthesias and decreased rectal sensation. Neurosurgery and neurology were consulted and patient was diagnosed to have transverse myelitis with an expansile T2 hyperintense lesion with enhancement that spans between T10-T12. Hospital transfers myelitis remains unclear. .  This patient is homebound for the following reasons Requires considerable and taxing effort to leave the home and Requires the assistance of 1 or more persons to leave the home . Caregiver: Pt relative assists with IADL's and some house hold mobility as needed     Medications: Pt reports now taking levoFLOXacin (LEVAQUIN) 500 mg tablet  and educated to continue as directed per MD.     PMHx: Diverticulosis  GERD (gastroesophageal reflux disease)  Hypercholesteremia  Hypertension    PLOF: Pt lives by herself in a 1 story house. Prior to hospitalization, she was independent in all aspects of ADLs and IADLs and did not use any assistive device for ambulation. Pt was able to drive. Her daughters are taking turns in assisting patient at this time. SUBJECTIVE: Pt reports no pain or new compliants at this time. Her daughter is staying with her for next few weeks to assist as needed. DME ORDERED/RECOMMENEDED: N/A    OBJECTIVE:  BATHING: Pt has tub shower unit with tub transfer bench. Pt able to complete self bathing for upper and lower body with CGA  TOILETING: Pt has regular toilet but is awaiting for raised toilet and grab bars to be installed. Pt currently using bedside commode atthis time. Pt CGA for toielting. UB DRESSING: Pt set up for upper body dressing to yenifer/doff shirts  LB DRESSING: Pt CGA for lower body dressing to adjust in standing. Pt able to yenifer/doff socks and shoes.    GROOMING: Pt modified indepedendent for grooming for hair care, oral care and washing face/hands alternating sitting/standing   FEEDING: Pt independent for self feedig    Modified Gregorio RPE 4/10 after performing ambulation, transfers, and I/ADL assessment     OT instructed/demonstrated pt the following with good understanding:     IADL: Pt daughter assists with IADLs. Pt able to complete light meal prep. AMBULATION: Pt SBA for ambulation of short house hold distances using rolling walker  EOB/BED TRANSFER: Pt supervision for bed mobility  COUCH: Pt supervision for sit to stand transfers using rolling walker  TOILET: Pt CGA for sit to stand transfers from lower set toilet  TUB SHOWER:Pt has tub shower unit with tub transfer bench and awaiting grab bar placement    PATIENT RESPONSE TO TREATMENT: Pt responded well to home health OT assessment and presents highly motivated to return to her previous level of independence. PATIENT EDUCATION PROVIDED THIS VISIT:  OT role, energy conservation, fall prevention/safety training ADL/IADL tasks, continue diet and medications as instructed per MD, consult MD or urgent care for medical assistance as opposed to ER unless situation emergent. PATIENT LEVEL OF UNDERSTANDING OF EDUCATION PROVIDED: Pt able to teach back role of OT with good understanding. Pt able to teach back saftey recomendations including raising toilet and grab bar placements. Kait Peoples presents with good rehab potential to increase her balance, strength and endurance for completion of her daily routine. Pt is currently using rolling walker for support when navigating her house and with increased difficulty with completion toilet transfers. She was previsously completly independent with ADLs, IADLs and functional mobility with pt highy motivated to return to her PLOF. She is agreeable to continued home health skilled occupational therapy services to increase her saftey and independence within her home.     HOME EXERCISE PROGRAM:Pt to install grab bars by shower and with plans to replacing toilet for raised toilet vs use of bedside commode. CONTINUED NEED FOR THE FOLLOWING SKILLS: HH OT is medically necessary to address pain, decreased functional strength,  decreased independence and safety with functional transfers, decreased independence and safety performing ADL/IADL tasks, decreased activity and standing tolerance, and impaired balance in order to improve functional independence, obtain set goals, reduce risk of falls, reduce pain, improve quality of life, and return to PLOF. ASSESSMENT: Pt presents with good BUE strength of 4+/5 grossly allowing for her to utilize her BUE for progression of transfers and functional mobility. She however presents with increased difficulty for toilet transfers due to no places to push up with her BUE and low seat height. She presents with fair- standing balance relying on rolling walker for standing support. He standing balance is impacting her independence with standing ADLs like proximal breif adjustments for dressing and toileting. She would benifit from functional balance training to increase her saftey and independence with ADL/IADLs. Pt has not been completing IADLs and would benifit from IADL adaptive technqiues education and trials such as meal prep and cleaning to help her return to her previous level of independence. Skilled Care Provided: Pt completed full occupational therapy assessment to include balance, coordination, strengthening, ADL education/training, functional mobility and home safety.     PLAN: D/C 1w1, 2w2 with plans to discharge when goals are met or maximal potential acheived

## 2022-09-06 NOTE — Clinical Note
Pt lives in 1 story house alone but have family staying with her short term. She has bedside commode, tub transfer bench, wheelchair, and rolling walker. Pt presents with good BUE strength of 4+/5 grossly allowing for her to utilize her BUE for progression of transfers and functional mobility. Please provide BUE HEP so she can maintain strength and increase her BUE endurance. She presents with increased difficulty for toilet transfers due to no places to push up with her BUE and low seat height. She presents with fair- standing balance relying on rolling walker for standing support. He standing balance is impacting her independence with standing ADLs like proximal breif adjustments for dressing and toileting. She would benifit from functional balance training to increase her saftey and independence with ADL/IADLs. Pt has not been completing IADLs and would benifit from IADL adaptive technqiues education and trials such as meal prep and cleaning to help her return to her previous level of independence.  Please reach out with any questions/concerns

## 2022-09-06 NOTE — Clinical Note
Therapy Functional Score Assessment  Question                                            Score   Grooming                            1       Upper Dressing   1      Lower Dressing   1      Bathing                 3      Toilet Transfer                   2    Transfer                1            Ambulation                         3   Dyspnea                     2       Pain Interfering with activity        2  Est number therapy visits      5    Mrs. Tatum Fernandez presents with good rehab potential to increase her balance, strength and endurance for completion of her daily routine. Pt is currently using rolling walker for support when navigating her house and with increased difficulty with completion toilet transfers. She was previously completely independent with ADLs, IADLs and functional mobility with pt highly motivated to return to her PLOF. She is agreeable to continued home health skilled occupational therapy services to increase her safety and independence within her home.     PLAN: D/C 1w1, 2w2 with plans to discharge when goals are met or maximal potential achieved

## 2022-09-07 ENCOUNTER — HOME CARE VISIT (OUTPATIENT)
Dept: SCHEDULING | Facility: HOME HEALTH | Age: 77
End: 2022-09-07
Payer: MEDICARE

## 2022-09-07 VITALS
SYSTOLIC BLOOD PRESSURE: 124 MMHG | OXYGEN SATURATION: 96 % | DIASTOLIC BLOOD PRESSURE: 76 MMHG | TEMPERATURE: 98 F | HEART RATE: 98 BPM

## 2022-09-07 VITALS
TEMPERATURE: 97.8 F | OXYGEN SATURATION: 96 % | DIASTOLIC BLOOD PRESSURE: 78 MMHG | HEART RATE: 78 BPM | SYSTOLIC BLOOD PRESSURE: 124 MMHG | RESPIRATION RATE: 18 BRPM

## 2022-09-07 PROCEDURE — G0299 HHS/HOSPICE OF RN EA 15 MIN: HCPCS

## 2022-09-07 PROCEDURE — 3331090002 HH PPS REVENUE DEBIT

## 2022-09-07 PROCEDURE — 3331090001 HH PPS REVENUE CREDIT

## 2022-09-07 PROCEDURE — G0157 HHC PT ASSISTANT EA 15: HCPCS

## 2022-09-07 NOTE — CASE COMMUNICATION
Patient admitted to [address] [place] [address] on 9/4/22 for idiopathic transverse myelitis requiring disease process teaching and medication management. Parker Rowan MD  notified of patient admission to home health and plan of care including anticipated frequency of 2w2 1w5 2 prn and treatments/interventions/modalities of disease process teaching and medication management. Medications reconciled and all medications are available in th e home this visit. pt taking voltaren, lumigan, citrucel, timoptic added to list. pt not taking mobic at this time, asking PCP for a refill on next visit. Severe medication interactions noted on admission visit- levaquin and citracal, SN to notify PCP. Please call #[phone] with any questions. Thank you!   Ephraim Zhong, RN, BSN

## 2022-09-07 NOTE — Clinical Note
SN UNABLE TO OBTAIN BLOOD SPECIMEN, GOT BLOOD RETURNED BUT NOT ADEQUATE FOR FULL SPECIMEN CONTAINER.  SN INSTRUCTED PT TO INCREASE FLUIDS TO REHYDRATE FOR REATTEMPT TO OBTAIN CBC BMP MG LABS ON TOMORROW 9/8/2022

## 2022-09-07 NOTE — HOME HEALTH
S: Daughter present during tx session, no pain, no changes in medications  CAREGIVER PARTICIPATION: Pt is bathing and dressing herself, family is assisting with meals and household task  O: SEE INTERVENTION  EDUCATION: Pt educated in fall prevention, HEP   RESPONSE TO ED: Pt able teach back fall prevention and able to return demonstrate fall prevention  A: Pt is very fearful of falling declined amb tng outside due to fear, pt tolerated dynamic standing x 1.45 sec on even surfaces hitting balloon with LOB x 2, standing feet together with eyes closed pt sways immediately with loss of balance x 2, feet apart with head turns pt sways and able to maintain balance, c/o minimal fatigue following tx session, no c/o pain  P: Next visit will upgrade HEP to standing will also address balance and endurance

## 2022-09-07 NOTE — HOME HEALTH
Skilled services/Home bound verification:   Skilled Reason for admission/summary of clinical condition:  idiopathic transverse myelitis requiring disease process teaching and medication management. This patient is homebound for the following reasons Requires considerable and taxing effort to leave the home  and Requires the assistance of 1 or more persons to leave the home . Caregiver: daughters. Caregiver assists with iadls, adls, meal prep, med management, taking to md appointments. Medications reconciled and all medications are available in the home this visit. pt taking voltaren, lumigan, citrucel, timoptic added to list. pt not taking mobic at this time, asking PCP for a refill on next visit. The following education was provided regarding medications, medication interactions, and look alike medications (specify): reviewed side effects, purposes, dosage, frequencies. Medications  are effective at this time. High risk medication teaching regarding anticoagulants, hyperglycemic agents or opiod narcotics performed (specify) glucophage- reviewed side effects, purposes, dosage, frequencies. patient reminded of s/s of hypoglycemia/hyperglycemia, what to monitor for and who to report to/when. patient checking bs temporarily while taking glucophage due to hyperglycemia from steroids, per pt. MD notified of any discrepancies/medication interactions- levaquin and citracal.   Home health supplies by type and quantity ordered/delivered this visit include: na  Patient education provided this visit to include: patient/cg instructed to monitor for edema/increase in edema, to elevate extremity when edema occurs and to notify md if edema exceeds normal limits for patient, baseline lymphedema noted to left thigh. Discussed lymphedema with pt/cg. Pt educated that lymphedema is a chronic condition that will require daily care to manage, that patient should clean legs daily, apply lotion and compression stockings.  pt to monitor for weeping, any new sores on legs or any wounds that develop and to notify PCP immediately if this occurs. no open wounds or weeping noted at this time. patient/cg instructed to monitor for edema/increase in edema, to elevate extremity when edema occurs and to notify md if edema exceeds normal limits for patient. pt/cg instructed in care of bilateral lower extremities such as protecting from cuts, scrapes, burns/ not using ice or heat/ avoiding tight clothing and to wear compression stockings if prescribed by MD. encouraged patient to get three nutritional meals daily and to stay hydrated, drink plenty of fluids. reviewed low sodium diet- patient aware to limit sodium, no added sodium to diet. reviewed foods to avoid, how to order foods when eating out, how to read nutrition labels and measure sodium intake. discussed fall precautions in detail- having lighted hallways, removing throw rugs, monitoring medication that may alter mental status. patient made aware to turn a minimum of every 2 hours and to keep pressure off of bony prominences, to monitor for any pressure ulcer development/worsening. Sharps education provided: Clinician instructed patient/cg on proper disposal of sharps as follows: Containers should be made of hard plastic, be puncture-resistant and leakproof, such as a laundry detergent or bleach bottle.  When the container is ¾ full, it should be sealed with tape and labeled. DO NOT RECYCLE prior to discarding in the regular trash. Patient level of understanding of education provided: patient has a partial understanding of education that was provided at this time by engaging in all education provided and is able to verbalize understanding, first visit and will require additional education. pt denies any questions or concerns at this time. Skilled Care Performed this visit:  full body assessment and education as above.   Patient response to procedure performed:  patient tolerated assessment with no signs of discomfort, grimacing or pain, no complications or concerns noted. Home exercise program/Homework provided: patient instructed to perform sob hep 4-5 x daily and prn for sob, to promote lung expansion. pt also encouraged to use ICS q 2 hours and to perform sob hep during therapy. patient instructed to perform incontinence hep 3-4 x daily to strengthen muscles and to perform timed voiding q 2 hours to prevent incontinence from occurring. Pt/Caregiver instructed on plan of care and are agreeable to plan of care at this time. Pt declines HHA. Reid Heard MD  notified of patient admission to home health and plan of care including anticipated frequency of 2w2 1w5 2 prn and treatments/interventions/modalities of disease process teaching and medication management. Discharge planning discussed with patient and caregiver. Discharge planning as follows: Patient will be discharged once education has completed, patient is medically stable and pt/cg are able to independently manage medications and disease process. Pt/Caregiver did verbalize understanding of discharge planning. Next MD appointment TBD (date) with Reid Heard MD . Patient/caregiver encouraged/instructed to keep appointment as lack of follow through with physician appointment could result in discontinuation of home care services due to non-compliance.

## 2022-09-08 ENCOUNTER — HOME CARE VISIT (OUTPATIENT)
Dept: SCHEDULING | Facility: HOME HEALTH | Age: 77
End: 2022-09-08
Payer: MEDICARE

## 2022-09-08 ENCOUNTER — HOSPITAL ENCOUNTER (OUTPATIENT)
Dept: LAB | Age: 77
Discharge: HOME | End: 2022-09-08
Payer: MEDICARE

## 2022-09-08 LAB
ANION GAP SERPL CALC-SCNC: 13 MMOL/L (ref 3–18)
BASOPHILS # BLD: 0.1 K/UL (ref 0–0.1)
BASOPHILS NFR BLD: 1 % (ref 0–2)
BUN SERPL-MCNC: 12 MG/DL (ref 7–18)
BUN/CREAT SERPL: 15 (ref 12–20)
CALCIUM SERPL-MCNC: 9.5 MG/DL (ref 8.5–10.1)
CHLORIDE SERPL-SCNC: 102 MMOL/L (ref 100–111)
CO2 SERPL-SCNC: 23 MMOL/L (ref 21–32)
CREAT SERPL-MCNC: 0.82 MG/DL (ref 0.6–1.3)
DIFFERENTIAL METHOD BLD: NORMAL
EOSINOPHIL # BLD: 0.1 K/UL (ref 0–0.4)
EOSINOPHIL NFR BLD: 1 % (ref 0–5)
ERYTHROCYTE [DISTWIDTH] IN BLOOD BY AUTOMATED COUNT: 14.5 % (ref 11.6–14.5)
GLUCOSE SERPL-MCNC: 127 MG/DL (ref 74–99)
HCT VFR BLD AUTO: 40.9 % (ref 35–45)
HGB BLD-MCNC: 13.3 G/DL (ref 12–16)
IMM GRANULOCYTES # BLD AUTO: 0 K/UL (ref 0–0.04)
IMM GRANULOCYTES NFR BLD AUTO: 0 % (ref 0–0.5)
LYMPHOCYTES # BLD: 1.7 K/UL (ref 0.9–3.6)
LYMPHOCYTES NFR BLD: 30 % (ref 21–52)
MAGNESIUM SERPL-MCNC: 2.1 MG/DL (ref 1.6–2.6)
MCH RBC QN AUTO: 30.4 PG (ref 24–34)
MCHC RBC AUTO-ENTMCNC: 32.5 G/DL (ref 31–37)
MCV RBC AUTO: 93.4 FL (ref 78–100)
MONOCYTES # BLD: 0.4 K/UL (ref 0.05–1.2)
MONOCYTES NFR BLD: 7 % (ref 3–10)
NEUTS SEG # BLD: 3.4 K/UL (ref 1.8–8)
NEUTS SEG NFR BLD: 61 % (ref 40–73)
NRBC # BLD: 0 K/UL (ref 0–0.01)
NRBC BLD-RTO: 0 PER 100 WBC
PLATELET # BLD AUTO: 287 K/UL (ref 135–420)
PMV BLD AUTO: 9.3 FL (ref 9.2–11.8)
POTASSIUM SERPL-SCNC: 3.5 MMOL/L (ref 3.5–5.5)
RBC # BLD AUTO: 4.38 M/UL (ref 4.2–5.3)
SODIUM SERPL-SCNC: 138 MMOL/L (ref 136–145)
WBC # BLD AUTO: 5.7 K/UL (ref 4.6–13.2)

## 2022-09-08 PROCEDURE — 3331090001 HH PPS REVENUE CREDIT

## 2022-09-08 PROCEDURE — 83735 ASSAY OF MAGNESIUM: CPT

## 2022-09-08 PROCEDURE — 80048 BASIC METABOLIC PNL TOTAL CA: CPT

## 2022-09-08 PROCEDURE — 3331090002 HH PPS REVENUE DEBIT

## 2022-09-08 PROCEDURE — 85025 COMPLETE CBC W/AUTO DIFF WBC: CPT

## 2022-09-08 PROCEDURE — G0299 HHS/HOSPICE OF RN EA 15 MIN: HCPCS

## 2022-09-08 NOTE — HOME HEALTH
Skilled reason for visit: x2 venipuncture to left antecubital and hand. blood return but not sufficient to obtain specimen ordered. SN instructed pt to increase fluids over next 24 hours to promote rehydration for reattempt on 9/8/2022. Caregiver involvement: pt family is available to assist with adls meal prep and transport to md.    Medications reviewed and all medications are available in the home this visit. The following education was provided regarding medications:   patient to continue to take medications as prescribed. patient aware to monitor for effectiveness and to notify staff of any adverse reactions to medications/any changes to medication regimen. sn instructed patient and cg on lasix, use as a diuretic and potential for dizziness, drops in bp and falls    . MD notified of any discrepancies/look a-like medications/medication interactions: na  Medications are effective at this time. Home health supplies by type and quantity ordered/delivered this visit include: none needed     Patient education provided this visit: Patient is a fall risk. Educated pateint to sit on the side of the chair/bed, take a slow deep breaths, have feet firmly planted before standing up, use cane/walker if available, or have someone to assist      Sharps education provided:  Clinician instructed patient/CG on proper disposal of sharps: Containers should be made of hard plastic, be puncture-resistant and leakproof,     such as a laundry detergent or bleach bottle. When the container is ¾ full, it should be sealed with tape and labeled     DO NOT RECYCLE prior to discarding in the regular trash.             Patient level of understanding of education provided: pt verbalizes understanding of all education provided during visit      Skilled Care Performed this visit: same as reason for visit    Patient response to procedure performed:  pt tolerated without complaints of pain      Agency Progress toward goals: progressing. Patient's Progress towards personal goals: progressing    Home exercise program: sn instructed patient to increase fluids over next 24 hrs to promote rehydration for blood sample 9/8/22    Continued need for the following skills: Nursing and Physical Therapy    Plan for next visit: labs     Patient and/or caregiver notified and agrees to changes in the Plan of Care YES/NO/NA: N/A      The following discharge planning was discussed with the pt/caregiver:  Patient will be discharged once education has completed, patient is medically stable and pt is able to independently medication regimen and disease process and  longer requires skilled care.

## 2022-09-09 VITALS
TEMPERATURE: 98.9 F | OXYGEN SATURATION: 97 % | DIASTOLIC BLOOD PRESSURE: 60 MMHG | RESPIRATION RATE: 16 BRPM | HEART RATE: 89 BPM | SYSTOLIC BLOOD PRESSURE: 114 MMHG

## 2022-09-09 PROCEDURE — 3331090002 HH PPS REVENUE DEBIT

## 2022-09-09 PROCEDURE — 3331090001 HH PPS REVENUE CREDIT

## 2022-09-10 PROCEDURE — 3331090002 HH PPS REVENUE DEBIT

## 2022-09-10 PROCEDURE — 3331090001 HH PPS REVENUE CREDIT

## 2022-09-11 PROCEDURE — 3331090002 HH PPS REVENUE DEBIT

## 2022-09-11 PROCEDURE — 3331090001 HH PPS REVENUE CREDIT

## 2022-09-12 ENCOUNTER — HOME CARE VISIT (OUTPATIENT)
Dept: SCHEDULING | Facility: HOME HEALTH | Age: 77
End: 2022-09-12
Payer: MEDICARE

## 2022-09-12 LAB
FAX TO INFO,FAXT: NORMAL
FAX TO NUMBER,FAXN: NORMAL

## 2022-09-12 PROCEDURE — G0300 HHS/HOSPICE OF LPN EA 15 MIN: HCPCS

## 2022-09-12 PROCEDURE — 3331090001 HH PPS REVENUE CREDIT

## 2022-09-12 PROCEDURE — 3331090002 HH PPS REVENUE DEBIT

## 2022-09-13 ENCOUNTER — HOME CARE VISIT (OUTPATIENT)
Dept: SCHEDULING | Facility: HOME HEALTH | Age: 77
End: 2022-09-13
Payer: MEDICARE

## 2022-09-13 VITALS
RESPIRATION RATE: 18 BRPM | DIASTOLIC BLOOD PRESSURE: 76 MMHG | OXYGEN SATURATION: 96 % | SYSTOLIC BLOOD PRESSURE: 123 MMHG | TEMPERATURE: 98.8 F | HEART RATE: 67 BPM

## 2022-09-13 VITALS
DIASTOLIC BLOOD PRESSURE: 84 MMHG | HEART RATE: 88 BPM | OXYGEN SATURATION: 98 % | RESPIRATION RATE: 17 BRPM | TEMPERATURE: 98.7 F | SYSTOLIC BLOOD PRESSURE: 132 MMHG

## 2022-09-13 PROCEDURE — 3331090002 HH PPS REVENUE DEBIT

## 2022-09-13 PROCEDURE — G0158 HHC OT ASSISTANT EA 15: HCPCS

## 2022-09-13 PROCEDURE — 3331090001 HH PPS REVENUE CREDIT

## 2022-09-13 NOTE — HOME HEALTH
SUBJECTIVE: Patient repors no pain, no concenrs, and no falls. Guille Mora CAREGIVER INVOLVEMENT/ASSISTANCE NEEDED FOR: The pt daughter's helps with all I/ADLS due to her inabilty to do so. Guille Mora HOME HEALTH SUPPLIES BY TYPE AND QUANTITY ORDERED/DELIVERED THIS VISIT INCLUDE: NONE   . OBJECTIVE:  See interventions. .  Patient education provided this visit:  FOSS ROLE, Energy conservation techniques,and Purse-lip breathing techniques. .    Patient level of understanding of education provided: Patient in agreeance to education provided an was able to perform education provided with 85% accuracy. Pt     .  RESPONSE TO TREATMENT: Pt reported an 4/10 on numeric scale after performing AROM exercises with use of energy conservation techiques. Pt required two periods of rest while performig due to observation of SOB and fatigue. \.  ASSESSMENT OF PROGRESS TOWARD GOALS: Pt presents decreased activity tolerance while demostrating AROM and balance exercises exercises while standing with SBA with PRN FWW. Min verbal cuing provided for proper trunk alignment for proper breathing when displaying AROM/balance execises with use of purse-lip breathing techniques. It is advised that client continues to adhere to HEP addressing BUE exercies for continued strength and endurance. Patient RPE score on modfied nik scale was 5/10 on this date. It is also advised that client use FWW at all times due to recent reports of  fatigue and SOB. Guille Mora CONTINUED NEED FOR THE FOLLOWING SKILLS: HH OT is medically necessary to address pain, decreased functional strength, decreased independence and safety with functional transfers, decreased independence and safety performing ADL/IADL tasks, decreased activity and standing tolerance, decreased functional endurance, and impaired balance in order to improve functional independence, obtain set goals, reduce risk of falls, reduce pain, improve quality of life, and return to PLOF. Guille Mora   PLAN FOR NEXT VISIT: Marielena Melendez will address functional transfers and BUE gross exercises  .   THE FOLLOWING DISCHARGE PLANNING WAS DISCUSSED WITH THE PATIENT/CAREGIVER: [facility] d/c 2w1,2w3

## 2022-09-13 NOTE — HOME HEALTH
Skilled reason for visit: DM,HTN,Medication Management     Caregiver involvement: Daughter assist medication managment, meals and transportation     Medications reviewed and all medications are available in the home this visit. The following education was provided regarding medications: sn instructed patient on aspirin, use as blood thinner and potential for unusual bleeding and bruising. MD notified of any discrepancies/look a-like medications/medication interactions: n/a  Medications are effective  at this time. Home health supplies by type and quantity ordered/delivered this visit include: n/a    Patient education provided this visit: Instruct patient in Sick Day Guidelines including: checking blood sugars per physician's orders, increasing fluid intake  of sugar-free liquids within dietary guidelines, resting, continuing diabetic medication, and if unable to take solid food replacing it with juice, regular jello, regular soda pop or Popsicles. Notify physician/RN if levels continue over 240 or as otherwise specified by physician. Ishan education provided: n/a    Patient level of understanding of education provided: Patient/CG verbalized complete understanding of education provided     Skilled Care Performed this visit: education     Patient response to procedure performed:  n/a    Agency Progress toward goals:  Continue education    Patient's Progress towards personal goals: Progressing well     Home exercise program: Maintain daily log BS and BP     Continued need for the following skills: Nursing,PT    Plan for next visit: education     Patient and/or caregiver notified and agrees to changes in the Plan of Care YES/NO/NA: YES      The following discharge planning was discussed with the pt/caregiver: when patient reaches goals and medication is managed, and disease processes are understood patient agrees and understand that discharge will take place.

## 2022-09-14 ENCOUNTER — HOME CARE VISIT (OUTPATIENT)
Dept: SCHEDULING | Facility: HOME HEALTH | Age: 77
End: 2022-09-14
Payer: MEDICARE

## 2022-09-14 VITALS
HEART RATE: 75 BPM | OXYGEN SATURATION: 98 % | TEMPERATURE: 98.7 F | DIASTOLIC BLOOD PRESSURE: 86 MMHG | SYSTOLIC BLOOD PRESSURE: 122 MMHG

## 2022-09-14 PROCEDURE — G0157 HHC PT ASSISTANT EA 15: HCPCS

## 2022-09-14 PROCEDURE — G0299 HHS/HOSPICE OF RN EA 15 MIN: HCPCS

## 2022-09-14 PROCEDURE — 3331090001 HH PPS REVENUE CREDIT

## 2022-09-14 PROCEDURE — 3331090002 HH PPS REVENUE DEBIT

## 2022-09-14 NOTE — HOME HEALTH
S: Daughter present during tx session, no pain, no changes in medications  CAREGIVER PARTICIPATION: Pt is bathing and dressing herself, family is assisting with meals and household task  O: SEE INTERVENTION  EDUCATION: Pt educated in fall prevention, HEP   RESPONSE TO ED: Pt able teach back fall prevention and to return demostrate HEP  A: Pt tolerated standing therex at sink today added 2 exercises which pt was able to tolerate with c/o pain, performed balance act consisting of side stepping, retro walking and static standing with feet together pt able to tolerate with CGA loss of balance x 1, negotiates ramp to exit home with rails/SBA provided , pt tolerated increaed amb distance outside on uneven surfaces with FWW with supervision.  Pt progressing well toward established goals RPE 7 following tx session  P: Next visit will address gait, balance and endurance

## 2022-09-15 VITALS
SYSTOLIC BLOOD PRESSURE: 120 MMHG | RESPIRATION RATE: 18 BRPM | OXYGEN SATURATION: 99 % | TEMPERATURE: 97.8 F | DIASTOLIC BLOOD PRESSURE: 64 MMHG | HEART RATE: 70 BPM

## 2022-09-15 PROCEDURE — 3331090001 HH PPS REVENUE CREDIT

## 2022-09-15 PROCEDURE — 3331090002 HH PPS REVENUE DEBIT

## 2022-09-15 NOTE — HOME HEALTH
Skilled reason for visit: disease medication management     Caregiver involvement: pt family is available to assist with adls meal prep and transport to md.         Medications reviewed and all medications are available in the home this visit. The following education was provided regarding medications:   patient to continue to take medications as prescribed. patient aware to monitor for effectiveness and to notify staff of any adverse reactions to medications/any changes to medication regimen. sn instructed patient and cg on lasix, use as a diuretic and potential for dizziness, drops in bp and falls         . MD notified of any discrepancies/look a-like medications/medication interactions: na    Medications are effective at this time. Home health supplies by type and quantity ordered/delivered this visit include: none needed          Patient education provided this visit: Patient is a fall risk. Educated pateint to sit on the side of the chair/bed, take a slow deep breaths, have feet firmly planted before standing up, use cane/walker if available, or have someone to assist              Sharps education provided:  Clinician instructed patient/CG on proper disposal of sharps: Containers should be made of hard plastic, be puncture-resistant and leakproof,          such as a laundry detergent or bleach bottle. When the container is ¾ full, it should be sealed with tape and labeled          DO NOT RECYCLE prior to discarding in the regular trash. Patient level of understanding of education provided: pt verbalizes understanding of all education provided during visit              Skilled Care Performed this visit: same as reason for visit         Patient response to procedure performed:  pt tolerated without complaints of pain              Agency Progress toward goals: progressing.          Patient's Progress towards personal goals: progressing         Home exercise program: encouraged patient to get three nutritional meals daily and to stay hydrated, drink plenty of fluids. Continued need for the following skills: Nursing and Physical Therapy         Plan for next visit: labs          Patient and/or caregiver notified and agrees to changes in the Plan of Care YES/NO/NA: N/A           The following discharge planning was discussed with the pt/caregiver:  Patient will be discharged once education has completed, patient is medically stable and pt is able to independently medication regimen and disease process and  longer requires skilled care.

## 2022-09-16 ENCOUNTER — HOME CARE VISIT (OUTPATIENT)
Dept: SCHEDULING | Facility: HOME HEALTH | Age: 77
End: 2022-09-16
Payer: MEDICARE

## 2022-09-16 VITALS
OXYGEN SATURATION: 97 % | SYSTOLIC BLOOD PRESSURE: 118 MMHG | DIASTOLIC BLOOD PRESSURE: 88 MMHG | HEART RATE: 90 BPM | TEMPERATURE: 98.7 F

## 2022-09-16 PROCEDURE — G0157 HHC PT ASSISTANT EA 15: HCPCS

## 2022-09-16 PROCEDURE — 3331090002 HH PPS REVENUE DEBIT

## 2022-09-16 PROCEDURE — 3331090001 HH PPS REVENUE CREDIT

## 2022-09-16 PROCEDURE — G0158 HHC OT ASSISTANT EA 15: HCPCS

## 2022-09-16 NOTE — HOME HEALTH
Skilled reason for visit: venipuncture to r antecubital.  Caregiver involvement: pt family is available to assist with adls meal prep and transport to md.   Medications reviewed and all medications are available in the home this visit. The following education was provided regarding medications: patient to continue to take medications as prescribed. patient aware to monitor for effectiveness and to notify staff of any adverse reactions to medications/any changes to medication regimen. .   MD notified of any discrepancies/look a-like medications/medication interactions: na   Medications are effective at this time. Home health supplies by type and quantity ordered/delivered this visit include: none needed   Patient education provided this visit: Patient is a fall risk. Educated pateint to sit on the side of the chair/bed, take a slow deep breaths, have feet firmly planted before standing up, use cane/walker if available, or have someone to assist   Sharps education provided: Clinician instructed patient/CG on proper disposal of sharps: Containers should be made of hard plastic, be puncture-resistant and leakproof,   such as a laundry detergent or bleach bottle. When the container is ¾ full, it should be sealed with tape and labeled   DO NOT RECYCLE prior to discarding in the regular trash. Patient level of understanding of education provided: pt verbalizes understanding of all education provided during visit   Skilled Care Performed this visit: same as reason for visit   Patient response to procedure performed: pt tolerated without complaints of pain   Agency Progress toward goals: progressing.    Patient's Progress towards personal goals: progressing     Continued need for the following skills: Nursing and Physical Therapy   Patient and/or caregiver notified and agrees to changes in the Plan of Care YES/NO/NA: N/A   The following discharge planning was discussed with the pt/caregiver: Patient will be discharged once education has completed, patient is medically stable and pt is able to independently medication regimen and disease process and longer requires skilled care.

## 2022-09-16 NOTE — HOME HEALTH
S: Pt doing well reports compliance with HEP, no c/o pain, no changes in medications no falls   CAREGIVER PARTICIPATION: Pt is Ind with ADLs, family is assisting with meals and household task  EDUCATION: Pt educated in fall prevention, HEP and pressure relief  RESPONSE TO ED: Pt able teach back fall prevention and pressure relief also able to return demostrate HEP  O; SEE INTERVENTION:  A: Performed standing therex on balance board with UE support at kitchen counter without loss of balance,  performed static standing on balance board with feet apart, head turn, tolerated standing therex at sink today added 2 exercises which pt was able to tolerate with c/o pain, performed balance act consisting of side stepping, retro walking and static standing with feet together pt able to tolerate with CGA loss of balance x 1, negotiates ramp to exit home with rails/SBA provided , pt tolerated increaed amb distance outside on uneven surfaces with FWW with supervision.  Pt progressing well toward established goals RPE 7 following tx session  P: Next visit will address gait, balance and endurance

## 2022-09-17 VITALS
HEART RATE: 88 BPM | OXYGEN SATURATION: 98 % | RESPIRATION RATE: 17 BRPM | DIASTOLIC BLOOD PRESSURE: 87 MMHG | TEMPERATURE: 98 F | SYSTOLIC BLOOD PRESSURE: 114 MMHG

## 2022-09-17 PROCEDURE — 3331090002 HH PPS REVENUE DEBIT

## 2022-09-17 PROCEDURE — 3331090001 HH PPS REVENUE CREDIT

## 2022-09-17 NOTE — HOME HEALTH
SUBJECTIVE: Patient repors no pain, no concenrs, and no falls. Pt reports feeling happy about results while participating in [address]. [place] CAREGIVER INVOLVEMENT/ASSISTANCE NEEDED FOR: The pt daughter's helps with all I/ADLS due to her inabilty to do so. [place] HOME HEALTH SUPPLIES BY TYPE AND QUANTITY ORDERED/DELIVERED THIS VISIT INCLUDE: NONE     . OBJECTIVE:  See interventions. .    Patient education provided this visit:  FOSS ROLE, Energy conservation techniques,and Purse-lip breathing techniques. .         Patient level of understanding of education provided: Patient in agreeance to education provided an was able to perform education provided with 85% accuracy. Pt able to demo e         . RESPONSE TO TREATMENT: Pt reported an 4/10 on numeric scale after performing functional transfers and balance retraining tasks with use of purse-lip breathing techniques and energy conservation techiques. Pt required two periods of rest while performig due to observation of SOB and fatigue. \.    ASSESSMENT OF PROGRESS TOWARD GOALS: Pt presents decreased activity tolerance while demostrating functional transfers and balance exercises while standing with SBA with PRN FWW. Min verbal cuing provided for proper trunk alignment for proper breathing when displaying AROM/balance execises with use of purse-lip breathing techniques. It is advised that client continues to adhere to HEP addressing BUE exercies for continued strength and endurance. Patient RPE score on modfied nik scale was 5/10 on this date. It is also advised that client use FWW at all times due to recent reports of  fatigue and SOB. [place]     CONTINUED NEED FOR THE FOLLOWING SKILLS: HH OT is medically necessary to address pain, decreased functional strength, decreased independence and safety with functional transfers, decreased independence and safety performing ADL/IADL tasks, decreased activity and standing tolerance, decreased functional endurance, and impaired balance in order to improve functional independence, obtain set goals, reduce risk of falls, reduce pain, improve quality of life, and return to PLOF. [place] PLAN FOR NEXT VISIT: Terra Bolton will address functional transfers and BUE gross exercises    .     THE FOLLOWING DISCHARGE PLANNING WAS DISCUSSED WITH THE PATIENT/CAREGIVER: [facility] d/c 2w1,2w3

## 2022-09-18 PROCEDURE — 3331090001 HH PPS REVENUE CREDIT

## 2022-09-18 PROCEDURE — 3331090002 HH PPS REVENUE DEBIT

## 2022-09-19 PROCEDURE — 3331090002 HH PPS REVENUE DEBIT

## 2022-09-19 PROCEDURE — 3331090001 HH PPS REVENUE CREDIT

## 2022-09-20 ENCOUNTER — HOME CARE VISIT (OUTPATIENT)
Dept: SCHEDULING | Facility: HOME HEALTH | Age: 77
End: 2022-09-20
Payer: MEDICARE

## 2022-09-20 ENCOUNTER — HOME CARE VISIT (OUTPATIENT)
Dept: HOME HEALTH SERVICES | Facility: HOME HEALTH | Age: 77
End: 2022-09-20
Payer: MEDICARE

## 2022-09-20 PROCEDURE — 3331090002 HH PPS REVENUE DEBIT

## 2022-09-20 PROCEDURE — 3331090001 HH PPS REVENUE CREDIT

## 2022-09-21 ENCOUNTER — HOME CARE VISIT (OUTPATIENT)
Dept: SCHEDULING | Facility: HOME HEALTH | Age: 77
End: 2022-09-21
Payer: MEDICARE

## 2022-09-21 VITALS
HEART RATE: 89 BPM | OXYGEN SATURATION: 98 % | SYSTOLIC BLOOD PRESSURE: 119 MMHG | TEMPERATURE: 97.2 F | DIASTOLIC BLOOD PRESSURE: 82 MMHG

## 2022-09-21 PROCEDURE — G0157 HHC PT ASSISTANT EA 15: HCPCS

## 2022-09-21 PROCEDURE — 3331090002 HH PPS REVENUE DEBIT

## 2022-09-21 PROCEDURE — 3331090001 HH PPS REVENUE CREDIT

## 2022-09-21 NOTE — HOME HEALTH
SUBJECTIVE: Patient reports new onset Multiple Sclerosis on Monday. Reports feeling pretty well. Reporting fatigue following exercise program.   CAREGIVER INVOLVEMENT/ASSISTANCE NEEDED FOR: Assistance in the home provided by daughter as needed. HOME HEALTH SUPPLIES BY TYPE AND QUANTITY ORDERED/DELIVERED THIS VISIT INCLUDE: no supplies on this date. OBJECTIVE:  See interventions. ASSESSMENT OF PROGRESS TOWARD GOALS: Patient demonstrates progress toward goals noted by improvements in independent mobility with use of FWW, advancement in ambulation with use of SPC, advancement in exercise program to include hip hikes and SLS, no noted falls, pain has improved to 0/10. CONTINUED NEED FOR THE FOLLOWING SKILLS: Patient would benefit from continued skilled PT to address impaired balance, advance ambulation with LRAD.    PLAN FOR NEXT VISIT: 2 visits x 4 weeks, 1 visit x 1 week and continue gait training with LRAD, balance training, HEP  THE FOLLOWING DISCHARGE PLANNING WAS DISCUSSED WITH THE PATIENT/CAREGIVER: discharge planning completed with patient paz PEREA 10/2/22

## 2022-09-22 ENCOUNTER — HOME CARE VISIT (OUTPATIENT)
Dept: SCHEDULING | Facility: HOME HEALTH | Age: 77
End: 2022-09-22
Payer: MEDICARE

## 2022-09-22 ENCOUNTER — HOME CARE VISIT (OUTPATIENT)
Dept: HOME HEALTH SERVICES | Facility: HOME HEALTH | Age: 77
End: 2022-09-22
Payer: MEDICARE

## 2022-09-22 PROCEDURE — 3331090002 HH PPS REVENUE DEBIT

## 2022-09-22 PROCEDURE — 3331090001 HH PPS REVENUE CREDIT

## 2022-09-22 PROCEDURE — G0152 HHCP-SERV OF OT,EA 15 MIN: HCPCS

## 2022-09-22 NOTE — Clinical Note
Mrs. Alfonso Marks was seen by skilled OT for 5 visits s/p recovery from diagnosis of Encounter for surgical aftercare following surgery on the circulatory system. Skilled OT goals were to maximize safety and participation with self care, balance retraining and functional mobility in home setting. Patient  has been educated on energy conservation strategies to reduce SOB and level of exertion with I/ADL tasks. Education has also been provided to the pt for Purse-lip breathing techniques to prevent exertion when performing daily tasks I/ADLS. Patient has met goal on OT POC addressing IADL retraining. Pt able to obtain snack or drink from kitchen use FWW with I/MOD I while demonstrating good safety. Patient will demonstrate increased independence in house-keeping as evidenced by ability in completing house keeping tasks (vaccuming, wiping counters, dusting, picking up clutter, organizing etc.) with MOD I to increase her independence with IADLs. Pt has met goal on OT POC addressing optimal level of functioning. Pt able to complete HEP to address B UE strengthening with use of  AROM exercises, with use of handout only . Instrsutions as follows Pt should perform AROM exercises, 15 times each, 2-3 times, with 2# weights added (PRN). Pt able to follow HEP with proper body mechanics to increase B UE strength and edurance for increased independece with functional transfers . Patient has met goal on OT POC addressing functional mobilty and function. Pt able to perform functional transfers from EOB,chair, toliet, and tub shower transfer  with use of grab bar with MOD I with good safety and technique. Pt able to retrieve house hold objects safely in different locations (high and low) with MOD I to increase her independence with IADLs. Patient has met goal on OT POC addressing balance retraining. Pt static and dynamic balance is currently Fair.  Pt was able to stand unsupported without BUE support, while standing unsupported, weight shifting, and reaching ipsilaterally, w/o  LOB when crossing midline. Patient has met goal on OT POC addressing balance retraining. Pt static and dynamic balance is currently Fair. Pt was able to stand unsupported without BUE support, while standing unsupported, weight shifting, and reaching ipsilaterally, w/o  LOB when crossing midline. Therapist suggests continued use of HEP for continued BUE strength and endurance to perform house-hold tasks in home-setting. Patient has reached maximal potential wish skilled OT at this time. No further skilled OT is indicated at this time. MD office notified.  Thank you for your Referral!!!

## 2022-09-23 ENCOUNTER — HOME CARE VISIT (OUTPATIENT)
Dept: HOME HEALTH SERVICES | Facility: HOME HEALTH | Age: 77
End: 2022-09-23
Payer: MEDICARE

## 2022-09-23 ENCOUNTER — HOME CARE VISIT (OUTPATIENT)
Dept: SCHEDULING | Facility: HOME HEALTH | Age: 77
End: 2022-09-23
Payer: MEDICARE

## 2022-09-23 PROCEDURE — G0157 HHC PT ASSISTANT EA 15: HCPCS

## 2022-09-23 PROCEDURE — 3331090001 HH PPS REVENUE CREDIT

## 2022-09-23 PROCEDURE — 3331090002 HH PPS REVENUE DEBIT

## 2022-09-23 NOTE — HOME HEALTH
Caregiver involvement:Mrs. Hannah Groves currently lives in an [place] home with her daughter  . The patients friend  provides daily emotional support and assistance with self care and mobility. Medications reconciled and all medications are available in the home this visit. The following education was provided regarding medications, medication interactions, and look a like medications: Continue as directed by MD.  Medications  are effective at this time. Home health supplies by type and quantity ordered/delivered this visit include: n/a    Patient education provided this visit:  Educated on importance of performing BUE exercise daily for continued strength and endurance. Educated client on the of continue use of energy conservation and purse-lip breathing to prevent exertion when engaging in daily tasks living . Progress toward goals: Patient has met goal on OT POC addressing IADL retraining. Pt able to obtain snack or drink from kitchen use FWW with I/MOD I while demonstrating good safety. Patient will demonstrate increased independence in house-keeping as evidenced by ability in completing house keeping tasks (vaccuming, wiping counters, dusting, picking up clutter, organizing etc.) with MOD I to increase her independence with IADLs. Pt has met goal on OT POC addressing optimal level of functioning. Pt able to complete HEP to address B UE strengthening with use of  AROM exercises, with use of handout only . Instrsutions as follows Pt should perform AROM exercises, 15 times each, 2-3 times, with 2# weights added (PRN). Pt able to follow HEP with proper body mechanics to increase B UE strength and edurance for increased independece with functional transfers . Patient has met goal on OT POC addressing functional mobilty and function. Pt able to perform functional transfers from EOB,chair, toliet, and tub shower transfer  with use of grab bar with MOD I with good safety and technique.  Pt able to retrieve house hold objects safely in different locations (high and low) with MOD I to increase her independence with IADLs. Patient has met goal on OT POC addressing balance retraining. Pt static and dynamic balance is currently Fair. Pt was able to stand unsupported without BUE support, while standing unsupported, weight shifting, and reaching ipsilaterally, w/o  LOB when crossing midline. Patient has met goal on OT POC addressing balance retraining. Pt static and dynamic balance is currently Fair. Pt was able to stand unsupported without BUE support, while standing unsupported, weight shifting, and reaching ipsilaterally, w/o  LOB when crossing midline. Home exercise program: Continue with BUE HEP addressing AROM. Pt is to perform HEP that consist of AROM exercises such as Straight arm swings, Shoulder Shrugs, Shoulder rotation backwards and forwards, Flexion, extension)  to increase BUE strength,endurance,ROM and flexion to perform adls and iadls. HEP is to be performed 2-3x a day with rest breaks as needed, Completing each exercises 15 times each daily. while seated    Continued need for the following skills: Nursing and Physical Therapy    The following discharge planning was discussed with the pt/caregiver: ELIAZAR OT. Pt has reached maximal potential with self care and functional mobility in her home setting.   Caregiver/spouse

## 2022-09-24 PROCEDURE — 3331090002 HH PPS REVENUE DEBIT

## 2022-09-24 PROCEDURE — 3331090001 HH PPS REVENUE CREDIT

## 2022-09-25 VITALS
DIASTOLIC BLOOD PRESSURE: 81 MMHG | SYSTOLIC BLOOD PRESSURE: 112 MMHG | TEMPERATURE: 98 F | OXYGEN SATURATION: 99 % | HEART RATE: 95 BPM

## 2022-09-25 PROCEDURE — 3331090001 HH PPS REVENUE CREDIT

## 2022-09-25 PROCEDURE — 3331090002 HH PPS REVENUE DEBIT

## 2022-09-25 NOTE — HOME HEALTH
SUBJECTIVE: pt reports increased pain followng new exercises and use of cane. CAREGIVER INVOLVEMENT/ASSISTANCE NEEDED FOR: dtr present and able to assist as needed. HOME HEALTH SUPPLIES BY TYPE AND QUANTITY ORDERED/DELIVERED THIS VISIT INCLUDE: no supplies ordered on this date  OBJECTIVE:  See interventions. ASSESSMENT OF PROGRESS TOWARD GOALS: Patient is making progress toward established goals noted by no assistive device use with ambulation for household distance, advancement in balance program to reduce risk for falls, continued education on exercise program to advance strength. CONTINUED NEED FOR THE FOLLOWING SKILLS: Patient would benefit from continued skilled services for improved assistance with ambulation without an AD, improve balance and reduce falls, advance exercise program to strengthen. PLAN FOR NEXT VISIT: Next visit will address balance and strength  THE FOLLOWING DISCHARGE PLANNING WAS DISCUSSED WITH THE PATIENT/CAREGIVER: 2x a week for 1 week then 1x a week x 1 week for discharge on 10/3/22. Discharge planning discussed with patient and patient in agreement for discharge plan.

## 2022-09-26 PROCEDURE — 3331090001 HH PPS REVENUE CREDIT

## 2022-09-26 PROCEDURE — 3331090002 HH PPS REVENUE DEBIT

## 2022-09-27 ENCOUNTER — HOME CARE VISIT (OUTPATIENT)
Dept: SCHEDULING | Facility: HOME HEALTH | Age: 77
End: 2022-09-27
Payer: MEDICARE

## 2022-09-27 VITALS
HEART RATE: 73 BPM | SYSTOLIC BLOOD PRESSURE: 130 MMHG | OXYGEN SATURATION: 98 % | TEMPERATURE: 98.7 F | DIASTOLIC BLOOD PRESSURE: 90 MMHG

## 2022-09-27 PROCEDURE — G0157 HHC PT ASSISTANT EA 15: HCPCS

## 2022-09-27 PROCEDURE — 3331090002 HH PPS REVENUE DEBIT

## 2022-09-27 PROCEDURE — 3331090001 HH PPS REVENUE CREDIT

## 2022-09-27 NOTE — HOME HEALTH
SUBJECTIVE: Pt doing well no c/o pain, no falls reported, pt has been using cane in home and is comfortable with its use. 1 medication change Tamsulosin . 04 mg 2 cap 1x day has been discharded  CAREGIVER INVOLVEMENT/ASSISTANCE NEEDED FOR: Pt is Ind with ADLs, Meal prep and Medications   OBJECTIVE:  See interventions.   EDUCATION: Reviewed fall prevention and pressure relief  RESPONSE TO EDUCATION: Pt able to teach back education  ASSESSMENT OF PROGRESS TOWARD GOALS: Patient continues to progressing well toward established goals tolerated amb o uneven surfaces x 800ft with SPC, demonstrates improved balance evident by improved TUG 12 and FTSTS 15, Pt has been compliant with HEP 2x day  PLAN FOR NEXT VISIT: Next visit will address TINETTI and Strength  THE FOLLOWING DISCHARGE PLANNING WAS DISCUSSED WITH THE PATIENT/CAREGIVER: Pt has been prepared for DC next week, anticipated DC 10/3/22 [address] has been signed

## 2022-09-28 ENCOUNTER — HOME CARE VISIT (OUTPATIENT)
Dept: SCHEDULING | Facility: HOME HEALTH | Age: 77
End: 2022-09-28
Payer: MEDICARE

## 2022-09-28 VITALS
TEMPERATURE: 98.4 F | HEART RATE: 76 BPM | DIASTOLIC BLOOD PRESSURE: 78 MMHG | OXYGEN SATURATION: 97 % | SYSTOLIC BLOOD PRESSURE: 130 MMHG

## 2022-09-28 PROCEDURE — G0157 HHC PT ASSISTANT EA 15: HCPCS

## 2022-09-28 PROCEDURE — 3331090002 HH PPS REVENUE DEBIT

## 2022-09-28 PROCEDURE — 3331090001 HH PPS REVENUE CREDIT

## 2022-09-28 PROCEDURE — G0299 HHS/HOSPICE OF RN EA 15 MIN: HCPCS

## 2022-09-28 NOTE — Clinical Note
[facility] DISCIPLINE DISCHARGE 9/28/2022 FOR DISEASE MED MANAGEMENT, GOALS MET. THANK YOU AGAIN FOR REFERRAL !     RESPECTFULLY,  MISHA TREVINO

## 2022-09-28 NOTE — HOME HEALTH
Assessment and Summary of Care:  Patient's current functional status before discharge is as follows  Strength: right LE: hip flexion 4+/5, knee extension 4+/5, Knee flexion 4+/5, hip adduction 5/5, hip abduction 4+/5, dorsiflexion 4/5, Left LE: hip flexion 4+/5, knee extension 4+/5, Knee flexion 4+/5, hip adduction 5/5, hip abduction 4+/5, dorsiflexion 4/5  ROM: Patient demonstrates functional appropriate ROM for mobility with slight limitation in BLE dorsiflexion. Bed Mobility: Demonstrates independence with bed mobility  Transfers: Patient demonstrates mod I independence with all fucntional tranfers. Gait/WC mobility: Demonstrates mod independence with ambulation over even and uneven surface types, down/up ramp with intermittent use of SPC for >5 minutes and >500 feet with slight HUMPHREY demonstrating 6/10 on TUCKER RPE scale post ambulation. Demonstrates independence with indoor even surface moblity without SPC. Stairs: step to gait pattern x 4 steps with door frame and SPC use to complete with supervision level and verbal/visual cues for techinique and safety. Recommendation for hand rail to be installed. Special Tests: Tinetti :25/28 indicating a low fall risk, TU seconds indicating a moderate fall risk 5x STS: 15x  Recommendations: Patient verbalizes that she feels like she no longer requires therapy. Recommend HEP and walking program to maintain current level of function. Patient will be discharged from skilled PT services with all goals met. Discharge set 10/3/2022.

## 2022-09-29 VITALS
RESPIRATION RATE: 18 BRPM | SYSTOLIC BLOOD PRESSURE: 104 MMHG | TEMPERATURE: 97.8 F | DIASTOLIC BLOOD PRESSURE: 60 MMHG | OXYGEN SATURATION: 97 % | HEART RATE: 82 BPM

## 2022-09-29 PROCEDURE — 3331090001 HH PPS REVENUE CREDIT

## 2022-09-29 PROCEDURE — 3331090002 HH PPS REVENUE DEBIT

## 2022-09-30 PROCEDURE — 3331090001 HH PPS REVENUE CREDIT

## 2022-09-30 PROCEDURE — 3331090002 HH PPS REVENUE DEBIT

## 2022-09-30 NOTE — HOME HEALTH
Skilled reason for visit: disease medication management SN Discipline discharge          Caregiver involvement: pt family is available to assist with adls meal prep and transport to md.                   Medications reviewed and all medications are available in the home this visit. The following education was provided regarding medications:   patient to continue to take medications as prescribed. patient aware to monitor for effectiveness and to notify staff of any adverse reactions to medications/any changes to medication regimen. sn instructed patient and cg on lasix, use as a diuretic and potential for dizziness, drops in bp and falls                   . MD notified of any discrepancies/look a-like medications/medication interactions: na         Medications are effective at this time. Home health supplies by type and quantity ordered/delivered this visit include: none needed                    Patient education provided this visit: Patient is a fall risk. Educated pateint to sit on the side of the chair/bed, take a slow deep breaths, have feet firmly planted before standing up, use cane/walker if available, or have someone to assist                             Sharps education provided:  Clinician instructed patient/CG on proper disposal of sharps: Containers should be made of hard plastic, be puncture-resistant and leakproof,                    such as a laundry detergent or bleach bottle. When the container is ¾ full, it should be sealed with tape and labeled                    DO NOT RECYCLE prior to discarding in the regular trash.                                                    Patient level of understanding of education provided: pt verbalizes understanding of all education provided during visit                             Skilled Care Performed this visit: same as reason for visit                   Patient response to procedure performed:  pt tolerated without complaints of pain                             Agency Progress toward goals: goals met                  Patient's Progress towards personal goals:goals met                    Home exercise program: encouraged patient to get three nutritional meals daily and to stay hydrated, drink plenty of fluids. Continued need for the following skills: na                 Plan for next visit: na                   Patient and/or caregiver notified and agrees to changes in the Plan of Care YES/NO/NA: yes                    The following discharge planning was discussed with the pt/caregiver:  Patient  discharged 9/28/22 education has been completed, patient is medically stable and pt is able to independently medication regimen and disease process and  no longer requires skilled care.

## 2022-10-01 PROCEDURE — 3331090001 HH PPS REVENUE CREDIT

## 2022-10-01 PROCEDURE — 3331090002 HH PPS REVENUE DEBIT

## 2022-10-02 PROCEDURE — 3331090002 HH PPS REVENUE DEBIT

## 2022-10-02 PROCEDURE — 3331090001 HH PPS REVENUE CREDIT

## 2022-10-03 ENCOUNTER — HOME CARE VISIT (OUTPATIENT)
Dept: SCHEDULING | Facility: HOME HEALTH | Age: 77
End: 2022-10-03
Payer: MEDICARE

## 2022-10-03 PROCEDURE — 3331090002 HH PPS REVENUE DEBIT

## 2022-10-03 PROCEDURE — 3331090001 HH PPS REVENUE CREDIT

## 2022-10-03 PROCEDURE — G0151 HHCP-SERV OF PT,EA 15 MIN: HCPCS

## 2022-10-04 VITALS
OXYGEN SATURATION: 99 % | TEMPERATURE: 97.4 F | HEART RATE: 73 BPM | DIASTOLIC BLOOD PRESSURE: 76 MMHG | SYSTOLIC BLOOD PRESSURE: 118 MMHG | RESPIRATION RATE: 16 BRPM

## 2022-10-04 NOTE — HOME HEALTH
PT DC summary:  Pt is a 79 y/o female  who is diagnosed with MS in September. S:  Pt denies falls and reported change in medications. Pt. is doing well and has been doing her exercises and walking  program.   O:Medications reconciled. PT home care eval date is 9/4/22. Pt  received 2x/wk PT sessions for strengthening, bed mob and transfer trng, gait trng, HEP, balance trng, fall risk reduction strategies. Pt's current status :  Pain:  denies; Pt is ind in self management of pain. Goal achieved. Light touch sesations:  Numbness on B feet,  90% intact light touch sensation on soles of B feet. ROM:  all peripheral joints = WNL. Goal achieved. Bed mobility:  sit <> supine = ind.  Goal achieved. MMT BLE =  4-5/5  Transfers:  sit <> stand from multiple surfaces including shower and car = ind with SC. Goal achieved. Gait:  780 ft, ind using no AD indoor, uses SC for outdoor terrain (during 9/28/22 visit), exhibiting good gait pattern    Goal achieved. Functional tests:  TUG:  10.3 sec = not a fall risk. Goal achieved. Tinetti:  28 /28  = not a fall risk. Goal achieved. 6 MWT:  780  ft, O2 sats = 99%;  indicative of improved walking endurance. Goal achieved. front entry Stairs negotiation for ingress/egress to home (9/28/22 visit):  ind holding on to railing and SC  with correct foot sequencing = Goal achieved. HEP:  ind using  HEP handout and pt has been doing exercises daily. Goal achieved. Patient DC instructions:  Continue doing HEP and ambulate once every hour during daytime. Do endurance walk (6MWT) 2-3x/week. Patient level of understanding of education provided:  Pt. understands that MS has fluctuations and she knows to avoid known triggers of MS exacerbation such as exhaustion, sickness, extreme heat and cold, etc.  She also knows to maintain current functional level by doing her HEP and ambulation program.  Patient response to treatment:  Tolerated well.   Pt is positive and has good attitude. Caregiver involvement/assistance needed: daughter  Chiara lives with pt and assists with ADLs, iADLs, functional mobility, transportation. A:  Patient demonstrated significant functional gains in PT and has met her goals. She understands nature of MS and has done her own research to further educate herself. She has achieved highest functional level at this time and declines OPPT. She knows she has to keep up with her neurologist appointments. P: DC PT and pt agreed. Pt was instructed to continue with HEP and ambulation. F/u with PCP Dr. Melchor Hoskins in December 2022. Pt has to call and set up  F/u appointment with neurologist Dr. Nba Gracia. Left VM at Dr. Thompson Kimball office to inform of DC from home care PT and agency.

## 2024-06-17 ENCOUNTER — HOSPITAL ENCOUNTER (OUTPATIENT)
Facility: HOSPITAL | Age: 79
Discharge: HOME | End: 2024-06-20
Payer: MEDICARE

## 2024-06-17 DIAGNOSIS — G35 MULTIPLE SCLEROSIS (HCC): ICD-10-CM

## 2024-06-17 LAB — CREAT UR-MCNC: 0.6 MG/DL (ref 0.6–1.3)

## 2024-06-17 PROCEDURE — 70553 MRI BRAIN STEM W/O & W/DYE: CPT

## 2024-06-17 PROCEDURE — 82565 ASSAY OF CREATININE: CPT

## 2024-06-17 PROCEDURE — 6360000004 HC RX CONTRAST MEDICATION: Performed by: PSYCHIATRY & NEUROLOGY

## 2024-06-17 PROCEDURE — A9577 INJ MULTIHANCE: HCPCS | Performed by: PSYCHIATRY & NEUROLOGY

## 2024-06-17 RX ADMIN — GADOBENATE DIMEGLUMINE 15 ML: 529 INJECTION, SOLUTION INTRAVENOUS at 11:41

## 2024-11-14 ENCOUNTER — HOSPITAL ENCOUNTER (OUTPATIENT)
Facility: HOSPITAL | Age: 79
Setting detail: RECURRING SERIES
Discharge: HOME | End: 2024-11-17
Payer: MEDICARE

## 2024-11-14 PROCEDURE — 97110 THERAPEUTIC EXERCISES: CPT

## 2024-11-14 PROCEDURE — 97161 PT EVAL LOW COMPLEX 20 MIN: CPT

## 2024-11-14 NOTE — PROGRESS NOTES
PHYSICAL / OCCUPATIONAL THERAPY - DAILY TREATMENT NOTE (updated )    Patient Name: Gema Dasilva    Date: 2024    : 1945  Insurance: Payor: MEDICARE / Plan: MEDICARE PART A AND B / Product Type: *No Product type* /      Patient  verified Yes     Visit #   Current / Total 1 24   Time   In / Out 4:04 4:36   Pain   In / Out 0 0 discomfort   Subjective Functional Status/Changes: See POC     TREATMENT AREA =  Pain in left shoulder [M25.512]    OBJECTIVE    14 min   Eval - untimed                      Therapeutic Procedures:  Tx Min Billable or 1:1 Min (if diff from Tx Min) Procedure, Rationale, Specifics   10 10 65954 Therapeutic Exercise (timed):  increase ROM, strength, coordination, balance, and proprioception to improve patient's ability to progress to PLOF and address remaining functional goals. (see flow sheet as applicable)     Details if applicable:  HEP instruction and demonstration     8 0 42436 Self Care/Home Management (timed):  improve patient knowledge and understanding of home injury/symptom/pain management, positioning, posture/ergonomics, home safety, activity modification, transfer techniques, and joint protection strategies  to improve patient's ability to progress to PLOF and address remaining functional goals.  (see flow sheet as applicable)     Details if applicable:  pt education on relevant anatomy/physiology, pt education to not push through pain with exercises and daily activities   18 10 [facility] Totals Reminder: bill using total billable min of TIMED therapeutic procedures (example: do not include dry needle or estim unattended, both untimed codes, in totals to left)  8-22 min = 1 unit; 23-37 min = 2 units; 38-52 min = 3 units; 53-67 min = 4 units; 68-82 min = 5 units   Total Total     TOTAL TREATMENT TIME:        32     [x]  Patient Education billed concurrently with other procedures   [x] Review HEP    [] Progressed/Changed HEP, detail:    [] Other detail:       Objective 
measures addressing body structure, function, activity limitation and / or participation in recreation  ;Presentation:  LOW Complexity : Stable, uncomplicated  ;Clinical Decision Making: QuickDASH: Disability Arm, Shoulder, Hand = 13.6 % ; (0% - 40% Normal to Mild Disability) = LOW Complexity  Overall Complexity Rating: LOW   Problem List: pain affecting function, decrease ROM, decrease strength, edema affection function, decrease ADL/functional abilities, decrease activity tolerance, decrease flexibility/joint mobility, and decrease transfer abilities    Treatment Plan may include any combination of the followin Therapeutic Exercise, 65022 Neuromuscular Re-Education, 48996 Manual Therapy, 72404 Therapeutic Activity, 55736 Self Care/Home Management, 33787 Electrical Stim unattended /  (MCR), 57947 Electrical Stim attended, and 13834 Ultrasound  Patient / Family readiness to learn indicated by: asking questions, trying to perform skills, interest, return verbalization , and return demonstration   Persons(s) to be included in education: patient (P)  Barriers to Learning/Limitations: none  Measures taken if barriers to learning present: n/a  Patient Goal (s): no pain when laying on left side  Patient Self Reported Health Status: good  Rehabilitation Potential: good    Short Term Goals: To be accomplished in 8 treatments   Pt will report compliance and independence to [facility] to help the pt manage their pain and symptoms.  Status at last note/certification:  established  2.     Pt will improve AROM left shoulder flex/ABD/ER to WNL with no pain/discomfort to improve her ability to return to PLOF.   Status at last note/certification: flex 146 degs in sitting, ABD WNL in sitting with discomfort at end range, ER WNL in supine with pain in the AC joint region at end range   Long Term Goals: To be accomplished in 24 treatments  Pt will improve her QuickDASH score to 5% or less to improve ability to perform

## 2024-11-18 ENCOUNTER — HOSPITAL ENCOUNTER (OUTPATIENT)
Facility: HOSPITAL | Age: 79
Setting detail: RECURRING SERIES
Discharge: HOME | End: 2024-11-21
Payer: MEDICARE

## 2024-11-18 PROCEDURE — 97110 THERAPEUTIC EXERCISES: CPT

## 2024-11-18 NOTE — PROGRESS NOTES
document represent the material reviewed with the patient via the .     Future Appointments   Date Time Provider Department Center   11/20/2024  5:20 PM [facility]   11/26/2024  2:40 PM Keila Ponce, PT [facility]   12/2/2024  2:00 PM Keila Ponce, PT [facility]   12/4/2024  2:00 PM Kd Zepeda, PTA [facility]   12/9/2024  2:00 PM [facility]ONS[facility]   12/11/2024  2:00 PM Keila Ponce, PT [facility]   12/16/2024  2:00 PM Melissa Martínez, PT [facility]   12/18/2024  2:00 PM Kd Zepeda, PTA [facility]   12/23/2024  2:00 PM [facility]   12/26/2024  2:00 PM Jemma Oliver, PT [facility]

## 2024-11-20 ENCOUNTER — HOSPITAL ENCOUNTER (OUTPATIENT)
Facility: HOSPITAL | Age: 79
Setting detail: RECURRING SERIES
Discharge: HOME | End: 2024-11-23
Payer: MEDICARE

## 2024-11-20 PROCEDURE — 97140 MANUAL THERAPY 1/> REGIONS: CPT

## 2024-11-20 PROCEDURE — 97110 THERAPEUTIC EXERCISES: CPT

## 2024-11-20 NOTE — PROGRESS NOTES
and increase tissue extensibility to improve patient's ability to progress to PLOF and address remaining functional goals.  The manual therapy interventions were performed at a separate and distinct time from the therapeutic activities interventions . (see flow sheet as applicable)     Details if applicable:  STM to left pec major/minor, subscap, and infraspinatus           Details if applicable:            Details if applicable:            Details if applicable:     41 41 [facility] Totals Reminder: bill using total billable min of TIMED therapeutic procedures (example: do not include dry needle or estim unattended, both untimed codes, in totals to left)  8-22 min = 1 unit; 23-37 min = 2 units; 38-52 min = 3 units; 53-67 min = 4 units; 68-82 min = 5 units   Total Total     [x]  Patient Education billed concurrently with other procedures   [x] Review HEP    [] Progressed/Changed HEP, detail:    [] Other detail:       Objective Information/Functional Measures/Assessment    Pt reported no pain in left shoulder prior to today's session. Continued with shoulder strengthening with introduction of shoulder globals - pt reported mild soreness in shoulder with ER and abduction. Trailed upper trap stretch with positive response, however reported referred pain into anterior arm. STM performed on left pec major/minor/subscapulars/infraspinatus with tenderness reported with all. Trial cupping at next visit. Pt ended session on heat for pain reduction.     Patient will continue to benefit from skilled PT / OT services to modify and progress therapeutic interventions, analyze and address functional mobility deficits, analyze and address ROM deficits, analyze and address strength deficits, analyze and address soft tissue restrictions, analyze and cue for proper movement patterns, and instruct in home and community integration to address functional deficits and attain remaining goals.     Progress toward goals / Updated goals:  []  See

## 2024-11-26 ENCOUNTER — HOSPITAL ENCOUNTER (OUTPATIENT)
Facility: HOSPITAL | Age: 79
Setting detail: RECURRING SERIES
Discharge: HOME | End: 2024-11-29
Payer: MEDICARE

## 2024-11-26 PROCEDURE — 97140 MANUAL THERAPY 1/> REGIONS: CPT

## 2024-11-26 PROCEDURE — 97110 THERAPEUTIC EXERCISES: CPT

## 2024-11-26 NOTE — PROGRESS NOTES
PHYSICAL / OCCUPATIONAL THERAPY - DAILY TREATMENT NOTE    Patient Name: Gema Dasilva    Date: 2024    : 1945  Insurance: Payor: MEDICARE / Plan: MEDICARE PART A AND B / Product Type: *No Product type* /      Patient  verified Yes     Visit #   Current / Total 4 24   Time   In / Out 2:43 3:32   Pain   In / Out 0 0   Subjective Functional Status/Changes: Pt reports some increased pain following her last session but states it is very short lived as it was resolved by the time she got home.     TREATMENT AREA =  Pain in left shoulder [M25.512]     OBJECTIVE    Modalities Rationale:     decrease pain to improve patient's ability to progress to PLOF and address remaining functional goals.    10 min  unbill [x]  Ice     []  Heat    location/position: Left shoulder/supine with green bolster under knees   Skin assessment post-treatment:   Redness, no adverse reactions        Therapeutic Procedures:    Tx Min Billable or 1:1 Min (if diff from Tx Min) Procedure, Rationale, Specifics   29 07 94046 Therapeutic Exercise (timed):  increase ROM, strength, coordination, balance, and proprioception to improve patient's ability to progress to PLOF and address remaining functional goals. (see flow sheet as applicable)     Details if applicable:       10 10 16912 Manual Therapy (timed):  decrease pain, increase ROM, increase tissue extensibility, and decrease trigger points to improve patient's ability to progress to PLOF and address remaining functional goals.  The manual therapy interventions were performed at a separate and distinct time from the therapeutic activities interventions . (see flow sheet as applicable)     Details if applicable:  Supine: PROM abd/flex/ER with gentle over pressure into stretch, pin and stretch pectoralis          Details if applicable:            Details if applicable:            Details if applicable:     39 39 [facility] BC Totals Reminder: bill using total billable min of TIMED therapeutic

## 2024-12-02 ENCOUNTER — HOSPITAL ENCOUNTER (OUTPATIENT)
Facility: HOSPITAL | Age: 79
Setting detail: RECURRING SERIES
Discharge: HOME | End: 2024-12-05
Payer: MEDICARE

## 2024-12-02 PROCEDURE — 97110 THERAPEUTIC EXERCISES: CPT

## 2024-12-02 NOTE — PROGRESS NOTES
PHYSICAL / OCCUPATIONAL THERAPY - DAILY TREATMENT NOTE    Patient Name: Gema Dasilva    Date: 2024    : 1945  Insurance: Payor: MEDICARE / Plan: MEDICARE PART A AND B / Product Type: *No Product type* /      Patient  verified Yes     Visit #   Current / Total 5 24   Time   In / Out 2:04 2:44   Pain   In / Out 0 0   Subjective Functional Status/Changes: Pt reports some sharp pain in her shoulder when rolling over in bed, but that it resolved quickly when she returned her arm to a midline position.     TREATMENT AREA =  Pain in left shoulder [M25.512]     OBJECTIVE         Therapeutic Procedures:    Tx Min Billable or 1:1 Min (if diff from Tx Min) Procedure, Rationale, Specifics   40 32 14551 Therapeutic Exercise (timed):  increase ROM, strength, coordination, balance, and proprioception to improve patient's ability to progress to PLOF and address remaining functional goals. (see flow sheet as applicable)     Details if applicable:              Details if applicable:            Details if applicable:            Details if applicable:            Details if applicable:     40 32 [facility] Totals Reminder: bill using total billable min of TIMED therapeutic procedures (example: do not include dry needle or estim unattended, both untimed codes, in totals to left)  8-22 min = 1 unit; 23-37 min = 2 units; 38-52 min = 3 units; 53-67 min = 4 units; 68-82 min = 5 units   Total Total     [x]  Patient Education billed concurrently with other procedures   [x] Review HEP    [] Progressed/Changed HEP, detail:    [] Other detail:       Objective Information/Functional Measures/Assessment    Pt reported sharp pain in posterior shoulder and shoulder blade region when rolling over that resovled quickly with position change. Pt demonstrated improved activity tolerance with increased volume of exercises without pain increase. Objectively improved left shoulder AROM without pain. Skilled cues to reduce horizontal adduction with

## 2024-12-04 ENCOUNTER — HOSPITAL ENCOUNTER (OUTPATIENT)
Facility: HOSPITAL | Age: 79
Setting detail: RECURRING SERIES
Discharge: HOME | End: 2024-12-07
Payer: MEDICARE

## 2024-12-04 PROCEDURE — 97112 NEUROMUSCULAR REEDUCATION: CPT

## 2024-12-04 PROCEDURE — 97110 THERAPEUTIC EXERCISES: CPT

## 2024-12-04 NOTE — PROGRESS NOTES
PHYSICAL / OCCUPATIONAL THERAPY - DAILY TREATMENT NOTE    Patient Name: Gema Dasilva    Date: 2024    : 1945  Insurance: Payor: MEDICARE / Plan: MEDICARE PART A AND B / Product Type: *No Product type* /      Patient  verified Yes     Visit #   Current / Total 6 24   Time   In / Out 1:59 2:49   Pain   In / Out 0/10 0/10   Subjective Functional Status/Changes: I woke up with pain in my left shoulder, so I assume I was laying on it.      TREATMENT AREA =  Pain in left shoulder [M25.512]     OBJECTIVE    Modalities Rationale:     decrease inflammation and decrease pain to improve patient's ability to progress to PLOF and address remaining functional goals.     min [] Estim Unattended, type/location:                                      []  w/ice    []  w/heat    min [] Estim Attended, type/location:                                     []  w/US     []  w/ice    []  w/heat    []  TENS insruct      min []  Mechanical Traction: type/lbs                   []  pro   []  sup   []  int   []  cont    []  before manual    []  after manual    min []  Ultrasound, settings/location:     10+ 1 minute set up min  unbill [x]  Ice     []  Heat    location/position: Supine: left shoulder    min []  Paraffin,  details:     min []  Vasopneumatic Device, press/temp:     min []  Whirlpool / Fluido:    If using vaso (only need to measure limb vaso being performed on)      pre-treatment girth :       post-treatment girth :       measured at (landmark location) :      min []  Other:    Skin assessment post-treatment:   Redness, no adverse reactions      Therapeutic Procedures:    Tx Min Billable or 1:1 Min (if diff from Tx Min) Procedure, Rationale, Specifics    20897 Therapeutic Exercise (timed):  increase ROM, strength, coordination, balance, and proprioception to improve patient's ability to progress to PLOF and address remaining functional goals. (see flow sheet as applicable)     Details if applicable:       [id number]

## 2024-12-09 ENCOUNTER — HOSPITAL ENCOUNTER (OUTPATIENT)
Facility: HOSPITAL | Age: 79
Setting detail: RECURRING SERIES
Discharge: HOME | End: 2024-12-12
Payer: MEDICARE

## 2024-12-09 PROCEDURE — 97110 THERAPEUTIC EXERCISES: CPT

## 2024-12-09 PROCEDURE — 97112 NEUROMUSCULAR REEDUCATION: CPT

## 2024-12-09 NOTE — PROGRESS NOTES
without pain, but prefers to sleep on the right.  If she lies on the left it will awaken her.  Otherwise no issues.  70% improved  Deficits: pain with reaching to pull on seat belt.    Patient will continue to benefit from skilled PT / OT services to modify and progress therapeutic interventions, analyze and address functional mobility deficits, analyze and address ROM deficits, analyze and address strength deficits, analyze and address soft tissue restrictions, analyze and cue for proper movement patterns, analyze and modify for postural abnormalities, analyze and address imbalance/dizziness, and instruct in home and community integration to address functional deficits and attain remaining goals.    Progress toward goals / Updated goals:  []  See Progress Note/Recertification     Pt will report compliance and independence to HEP to help the pt manage their pain and symptoms.  Status at last note/certification:  established  Current: pt reports compliance to HEP (11/18/2024)     2.     Pt will improve AROM left shoulder flex/ABD/ER to WNL with no pain/discomfort to improve her ability to return to PLOF.   Status at last note/certification: flex 146 degs in sitting, ABD WNL in sitting with discomfort at end range, ER WNL in supine with pain in the AC joint region at end range  Current: Progressing - flexion = 159 degrees, ABD = 135 degrees, ER = 60 degrees, all without pain (12/02/24)     Long Term Goals: To be accomplished in 24 treatments  Pt will improve her QuickDASH score to 5% or less to improve ability to perform ADLs.  Status at last note/certification: 13.6%     2.  Pt will increase MMT left shoulder flex/ER/ABD to 4+/5 to improve pain with end range reaching with the left UE.  Status at last note/certification: flex/ER 4/5, ABD 4-/5  Current:progressing: flex: 4+/5 ER 4/5, ABD 4/5. (12/4/2024)     3.  Pt will report an improvement in at worst pain to 4/10 to improve ability to tolerate household

## 2024-12-11 ENCOUNTER — HOSPITAL ENCOUNTER (OUTPATIENT)
Facility: HOSPITAL | Age: 79
Setting detail: RECURRING SERIES
Discharge: HOME | End: 2024-12-14
Payer: MEDICARE

## 2024-12-11 PROCEDURE — 97110 THERAPEUTIC EXERCISES: CPT

## 2024-12-11 PROCEDURE — 97530 THERAPEUTIC ACTIVITIES: CPT

## 2024-12-11 NOTE — PROGRESS NOTES
Physical Therapy Discharge Instructions      [facility]  [address] [place] [address] [phone] fax      Patient: Gema Dasilva  : 1945      Continue Home Exercise Program 1 times per day for 6 weeks, then decrease to 2-3 times per week      Continue with    [x] Ice  as needed 1-2 times per day     [] Heat           Follow up with MD:     [] Upon completion of therapy     [x] As needed      Recommendations:     [x]   Return to activity with home program    []   Return to activity with the following modifications:       []Post Rehab Program    []Join Independent aquatic program     []Return to/join local gym        Additional Comments: it was a pleasure working with you, good luck!      Keila Rees, PT 2024 2:33 PM    If an interpreting service was utilized for treatment of this patient, the contents of this document represent the material reviewed with the patient via the .

## 2024-12-11 NOTE — PROGRESS NOTES
PHYSICAL THERAPY - DISCHARGE DAILY NOTE AND SUMMARY (updated )    Patient Name: Gema Dasilva : 1945   Treatment/Medical Diagnosis: Pain in left shoulder [M25.512]   Referral Source: Axel Rainey MD     Payor: Payor: MEDICARE / Plan: MEDICARE PART A AND B / Product Type: *No Product type* /            Reporting Period : 24 to 24    Date: 2024    Patient  verified yes     Visit #   Current / Total 8 24   Time   In / Out 2:03 2:37   Pain   In / Out 0 0   Subjective Functional Status/Changes: Pt reports no significant changes since last session.     TREATMENT AREA =  Pain in left shoulder [M25.512]    OBJECTIVE    Therapeutic Procedures:  Tx Min Billable or 1:1 Min (if diff from Tx Min) Procedure, Rationale, Specifics   15 15 15264 Therapeutic Exercise (timed):  increase ROM, strength, coordination, balance, and proprioception to improve patient's ability to progress to PLOF and address remaining functional goals. (see flow sheet as applicable)     Details if applicable:  includes HEP update and partial performance   19  60585 Therapeutic Activity (timed):  use of dynamic activities replicating functional movements to increase ROM, strength, coordination, balance, and proprioception in order to improve patient's ability to progress to PLOF and address remaining functional goals.  (see flow sheet as applicable)     Details if applicable:  includes reassessment time          Details if applicable:            Details if applicable:            Details if applicable:     34     34     [facility] Totals Reminder: bill using total billable min of TIMED therapeutic procedures (example: do not include dry needle or estim unattended, both untimed codes, in totals to left)  8-22 min = 1 unit; 23-37 min = 2 units; 38-52 min = 3 units; 53-67 min = 4 units; 68-82 min = 5 units   Total Total     [x]  Patient Education billed concurrently with other procedures   [x] Review HEP    [] Progressed/Changed

## 2024-12-16 ENCOUNTER — APPOINTMENT (OUTPATIENT)
Facility: HOSPITAL | Age: 79
End: 2024-12-16
Payer: MEDICARE

## 2024-12-18 ENCOUNTER — APPOINTMENT (OUTPATIENT)
Facility: HOSPITAL | Age: 79
End: 2024-12-18
Payer: MEDICARE

## 2024-12-23 ENCOUNTER — APPOINTMENT (OUTPATIENT)
Facility: HOSPITAL | Age: 79
End: 2024-12-23
Payer: MEDICARE

## 2024-12-26 ENCOUNTER — APPOINTMENT (OUTPATIENT)
Facility: HOSPITAL | Age: 79
End: 2024-12-26
Payer: MEDICARE

## (undated) DEVICE — FLUFF AND POLYMER UNDERPAD,EXTRA HEAVY: Brand: WINGS

## (undated) DEVICE — SYR 10ML LUER LOK 1/5ML GRAD --

## (undated) DEVICE — AIRLIFE™ NASAL OXYGEN CANNULA CURVED, NONFLARED TIP WITH 14 FOOT (4.3 M) CRUSH-RESISTANT TUBING, OVER-THE-EAR STYLE: Brand: AIRLIFE™

## (undated) DEVICE — SNARE POLYP M W27MMXL240CM OVL STIFF DISP CAPTIVATOR

## (undated) DEVICE — SYRINGE MED 25GA 3ML L5/8IN SUBQ PLAS W/ DETACH NDL SFTY

## (undated) DEVICE — MEDI-VAC NON-CONDUCTIVE SUCTION TUBING: Brand: CARDINAL HEALTH

## (undated) DEVICE — ENDOSCOPY PUMP TUBING/ CAP SET: Brand: ERBE

## (undated) DEVICE — MEDI-VAC SUCTION HIGH CAPACITY: Brand: CARDINAL HEALTH

## (undated) DEVICE — CANNULA ORIG TL CLR W FOAM CUSHIONS AND 14FT SUPL TB 3 CHN

## (undated) DEVICE — FLEX ADVANTAGE 3000CC: Brand: FLEX ADVANTAGE

## (undated) DEVICE — SYRINGE MED 20ML STD CLR PLAS LUERLOCK TIP N CTRL DISP

## (undated) DEVICE — SYR 50ML SLIP TIP NSAF LF STRL --

## (undated) DEVICE — SOLUTION IRRIG 1000ML H2O STRL BLT

## (undated) DEVICE — CATHETER SUCT TR FL TIP 14FR W/ O CTRL

## (undated) DEVICE — GAUZE SPONGES,16 PLY: Brand: CURITY

## (undated) DEVICE — GOWN ISOL IMPERV UNIV, DISP, OPEN BACK, BLUE --